# Patient Record
Sex: FEMALE | Race: WHITE | Employment: OTHER | URBAN - METROPOLITAN AREA
[De-identification: names, ages, dates, MRNs, and addresses within clinical notes are randomized per-mention and may not be internally consistent; named-entity substitution may affect disease eponyms.]

---

## 2017-03-01 PROBLEM — N32.1 VESICOCOLIC FISTULA: Status: ACTIVE | Noted: 2017-03-01

## 2017-04-26 PROBLEM — Z93.2 ILEOSTOMY STATUS (HCC): Status: ACTIVE | Noted: 2017-04-26

## 2017-08-11 PROBLEM — K43.2 INCISIONAL HERNIA: Status: ACTIVE | Noted: 2017-08-11

## 2017-11-16 ENCOUNTER — OFFICE VISIT (OUTPATIENT)
Dept: ORTHOPEDIC SURGERY | Age: 67
End: 2017-11-16

## 2017-11-16 VITALS
SYSTOLIC BLOOD PRESSURE: 156 MMHG | OXYGEN SATURATION: 97 % | HEIGHT: 66 IN | TEMPERATURE: 98.3 F | DIASTOLIC BLOOD PRESSURE: 84 MMHG | BODY MASS INDEX: 32.78 KG/M2 | WEIGHT: 204 LBS | RESPIRATION RATE: 18 BRPM | HEART RATE: 78 BPM

## 2017-11-16 DIAGNOSIS — M85.80 OSTEOPENIA DETERMINED BY X-RAY: ICD-10-CM

## 2017-11-16 DIAGNOSIS — Z96.651 HISTORY OF RIGHT KNEE JOINT REPLACEMENT: ICD-10-CM

## 2017-11-16 DIAGNOSIS — M54.50 CHRONIC BILATERAL LOW BACK PAIN WITHOUT SCIATICA: ICD-10-CM

## 2017-11-16 DIAGNOSIS — M17.12 PRIMARY OSTEOARTHRITIS OF LEFT KNEE: ICD-10-CM

## 2017-11-16 DIAGNOSIS — G89.29 CHRONIC PAIN OF RIGHT KNEE: ICD-10-CM

## 2017-11-16 DIAGNOSIS — M16.11 PRIMARY OSTEOARTHRITIS OF RIGHT HIP: Primary | ICD-10-CM

## 2017-11-16 DIAGNOSIS — M25.561 CHRONIC PAIN OF RIGHT KNEE: ICD-10-CM

## 2017-11-16 DIAGNOSIS — Z78.0 POST-MENOPAUSAL: ICD-10-CM

## 2017-11-16 DIAGNOSIS — G89.29 CHRONIC BILATERAL LOW BACK PAIN WITHOUT SCIATICA: ICD-10-CM

## 2017-11-16 RX ORDER — MULTIVITAMIN WITH IRON
1 TABLET ORAL DAILY
COMMUNITY
End: 2018-08-08 | Stop reason: SDUPTHER

## 2017-11-16 RX ORDER — LANOLIN ALCOHOL/MO/W.PET/CERES
1000 CREAM (GRAM) TOPICAL DAILY
COMMUNITY

## 2017-11-16 RX ORDER — FUROSEMIDE 20 MG/1
TABLET ORAL 2 TIMES DAILY
COMMUNITY

## 2017-11-16 RX ORDER — LANOLIN ALCOHOL/MO/W.PET/CERES
1000 CREAM (GRAM) TOPICAL DAILY
COMMUNITY
End: 2018-04-24

## 2017-11-16 RX ORDER — LANOLIN ALCOHOL/MO/W.PET/CERES
CREAM (GRAM) TOPICAL DAILY
COMMUNITY
End: 2018-04-24

## 2017-11-16 RX ORDER — BETAMETHASONE SODIUM PHOSPHATE AND BETAMETHASONE ACETATE 3; 3 MG/ML; MG/ML
6 INJECTION, SUSPENSION INTRA-ARTICULAR; INTRALESIONAL; INTRAMUSCULAR; SOFT TISSUE ONCE
Qty: 1 ML | Refills: 0
Start: 2017-11-16 | End: 2017-11-16

## 2017-11-16 RX ORDER — POTASSIUM CHLORIDE 20 MEQ/1
TABLET, EXTENDED RELEASE ORAL DAILY
COMMUNITY

## 2017-11-16 NOTE — PROGRESS NOTES
Patient: Abhijeet Garrido                MRN: [id number]       SSN: [id number]  YOB: 1950        AGE: 74 y.o. SEX: female  Body mass index is 33.43 kg/(m^2). PCP: No primary care provider on file. 11/16/17  HISTORY: Ms. Gene Albarran is here today with complaints of severe right hip pain. She has had a knee replacement by Dr. Yayo Lemus about 15 years ago, which is starting to wear, and she has severe arthritis of the left knee, especially patellofemoral.  Her right hip is actually slightly neglected, and I think she could have had surgery about four years ago or so. The daughter is very much in favor of it. The whole family has had a history, somewhat, of hip dysplasia. The pain can be severe, and she would like the right hip replaced. We are going to get a bone density scan because it appears she has osteoporosis. She also complains of a bad back with radiculopathy all the way down the back. Apparently, when she had her diverticular surgery, they could not do a spinal due to the arthritis. PHYSICAL EXAMINATION:  On examination today, she walks very poorly with a significantly antalgic gait owing to the right side. She moves the head and neck adequately. The low back is fairly tender, and she does have decreased sensation to L4-5 bilaterally, although there is no foot drop. Straight leg raise is just equivocal.  The left knee has findings consistent with severe end-staged arthritis patellofemoral disease. RADIOGRAPHS:  Review of her x-rays, AP of the pelvis and AP and lateral of the hip, she has dysplasia and superior migration of the humeral head with a fairly dysplastic cup as well, and it looks like osteoporosis. The opposite hip is in pretty good shape. The right knee LCS has some mild osteolysis. The left knee has findings consistent with fairly severe arthritis.      PROCEDURE:  Under aseptic conditions and after informed, written consent with a time out, the left knee was injected with 1 cc of the Celestone preparation, i.e. 6 mg, which was well tolerated. PLAN:  She is going to return to see us after her bone density scan and MRI of the lumbar spine. I would favor a larger operation for her with an acetabular reconstruction. I would decline to do a direct anterior for her, and I understand if she wishes to go elsewhere for this, but I think she should have an acetabular reconstruction, and I think she will do extremely well with it. Having said this, other risks and benefits are described including but not limited to infection, DVT, pulmonary embolism, anesthetic complications, blood loss requiring transfusion, pain, stiffness, and other complications such as permanent limp. She walks very, very poorly today with a lurch. We will see her back accordingly. REVIEW OF SYSTEMS:      CON: negative for weight loss, fever  EYE: negative for double vision  ENT: negative for hoarseness  RS:   negative for Tb  GI:    negative for blood in stool  :  negative for blood in urine  Other systems reviewed and noted below. No past medical history on file. Family History   Problem Relation Age of Onset    Heart Disease Mother     Heart Attack Mother        Current Outpatient Prescriptions   Medication Sig Dispense Refill    potassium chloride (KLOR-CON M20) 20 mEq tablet Take  by mouth two (2) times a day.  furosemide (LASIX) 20 mg tablet Take  by mouth daily.  magnesium gluconate 500 mg (27 mg  elemental) tablet Take  by mouth two (2) times a day.  cyanocobalamin 1,000 mcg tablet Take 1,000 mcg by mouth daily.  cyanocobalamin (VITAMIN B-12) 1,000 mcg tablet Take 1,000 mcg by mouth daily.  VITAMIN E, DL,TOCOPHERYL ACET, (VITAMIN E ACETATE) 400 unit cap capsule Take  by mouth daily.  multivitamin with iron tablet Take 1 Tab by mouth daily.          Allergies   Allergen Reactions    Ancef [Cefazolin] Itching Past Surgical History:   Procedure Laterality Date    HX HERNIA REPAIR      HX KNEE REPLACEMENT      HX TONSILLECTOMY      HX TUBAL LIGATION         Social History     Social History    Marital status: UNKNOWN     Spouse name: N/A    Number of children: N/A    Years of education: N/A     Occupational History    Not on file. Social History Main Topics    Smoking status: Light Tobacco Smoker    Smokeless tobacco: Never Used    Alcohol use Not on file    Drug use: Not on file    Sexual activity: Not on file     Other Topics Concern    Not on file     Social History Narrative    No narrative on file       Visit Vitals    /84    Pulse 78    Temp 98.3 °F (36.8 °C) (Oral)    Resp 18    Ht 5' 5.5\" (1.664 m)    Wt 204 lb (92.5 kg)    SpO2 97%    BMI 33.43 kg/m2         PHYSICAL EXAMINATION:  GENERAL: Alert and oriented x3, in no acute distress, well-developed, well-nourished, afebrile. HEART: No JVD. EYES: No scleral icterus   NECK: No significant lymphadenopathy   LUNGS: No respiratory compromise or indrawing  ABDOMEN: Soft, non-tender, non-distended. Electronically signed by:  Shirley Morgan MD

## 2017-11-20 ENCOUNTER — DOCUMENTATION ONLY (OUTPATIENT)
Dept: ORTHOPEDIC SURGERY | Age: 67
End: 2017-11-20

## 2018-01-09 DIAGNOSIS — G89.29 CHRONIC BILATERAL LOW BACK PAIN WITHOUT SCIATICA: ICD-10-CM

## 2018-01-09 DIAGNOSIS — Z78.0 POST-MENOPAUSAL: ICD-10-CM

## 2018-01-09 DIAGNOSIS — M54.50 CHRONIC BILATERAL LOW BACK PAIN WITHOUT SCIATICA: ICD-10-CM

## 2018-01-09 DIAGNOSIS — M85.80 OSTEOPENIA DETERMINED BY X-RAY: ICD-10-CM

## 2018-02-23 ENCOUNTER — OFFICE VISIT (OUTPATIENT)
Dept: ORTHOPEDIC SURGERY | Age: 68
End: 2018-02-23

## 2018-02-23 VITALS
HEIGHT: 66 IN | SYSTOLIC BLOOD PRESSURE: 173 MMHG | WEIGHT: 203.6 LBS | OXYGEN SATURATION: 97 % | BODY MASS INDEX: 32.72 KG/M2 | HEART RATE: 78 BPM | TEMPERATURE: 98.7 F | DIASTOLIC BLOOD PRESSURE: 84 MMHG

## 2018-02-23 DIAGNOSIS — M16.11 PRIMARY OSTEOARTHRITIS OF RIGHT HIP: Primary | ICD-10-CM

## 2018-02-23 DIAGNOSIS — M85.80 OSTEOPENIA, UNSPECIFIED LOCATION: ICD-10-CM

## 2018-04-09 DIAGNOSIS — Z01.818 PREOP EXAMINATION: Primary | ICD-10-CM

## 2018-04-09 DIAGNOSIS — M16.11 PRIMARY OSTEOARTHRITIS OF RIGHT HIP: ICD-10-CM

## 2018-06-12 DIAGNOSIS — M17.12 PRIMARY OSTEOARTHRITIS OF LEFT KNEE: ICD-10-CM

## 2018-06-12 DIAGNOSIS — Z01.818 PREOP EXAMINATION: Primary | ICD-10-CM

## 2018-06-27 ENCOUNTER — HOSPITAL ENCOUNTER (OUTPATIENT)
Dept: PREADMISSION TESTING | Age: 68
Discharge: HOME | End: 2018-06-27
Payer: MEDICARE

## 2018-06-27 ENCOUNTER — HOSPITAL ENCOUNTER (OUTPATIENT)
Dept: GENERAL RADIOLOGY | Age: 68
Discharge: HOME | End: 2018-06-27
Payer: MEDICARE

## 2018-06-27 DIAGNOSIS — M17.12 PRIMARY OSTEOARTHRITIS OF LEFT KNEE: ICD-10-CM

## 2018-06-27 DIAGNOSIS — Z01.818 PREOP EXAMINATION: ICD-10-CM

## 2018-06-27 LAB
ABO + RH BLD: NORMAL
ALBUMIN SERPL-MCNC: 3.7 G/DL (ref 3.4–5)
ANION GAP SERPL CALC-SCNC: 5 MMOL/L (ref 3–18)
APPEARANCE UR: ABNORMAL
APTT PPP: 30.3 SEC (ref 23–36.4)
BASOPHILS # BLD: 0 K/UL (ref 0–0.06)
BASOPHILS NFR BLD: 0 % (ref 0–2)
BILIRUB UR QL: NEGATIVE
BLOOD GROUP ANTIBODIES SERPL: NORMAL
BUN SERPL-MCNC: 18 MG/DL (ref 7–18)
BUN/CREAT SERPL: 20 (ref 12–20)
CALCIUM SERPL-MCNC: 8.6 MG/DL (ref 8.5–10.1)
CHLORIDE SERPL-SCNC: 110 MMOL/L (ref 100–108)
CO2 SERPL-SCNC: 29 MMOL/L (ref 21–32)
COLOR UR: YELLOW
CREAT SERPL-MCNC: 0.91 MG/DL (ref 0.6–1.3)
DIFFERENTIAL METHOD BLD: ABNORMAL
EOSINOPHIL # BLD: 0.1 K/UL (ref 0–0.4)
EOSINOPHIL NFR BLD: 1 % (ref 0–5)
ERYTHROCYTE [DISTWIDTH] IN BLOOD BY AUTOMATED COUNT: 15.4 % (ref 11.6–14.5)
EST. AVERAGE GLUCOSE BLD GHB EST-MCNC: 111 MG/DL
GLUCOSE SERPL-MCNC: 93 MG/DL (ref 74–99)
GLUCOSE UR STRIP.AUTO-MCNC: NEGATIVE MG/DL
HBA1C MFR BLD: 5.5 % (ref 4.2–5.6)
HCT VFR BLD AUTO: 39.1 % (ref 35–45)
HGB BLD-MCNC: 12.3 G/DL (ref 12–16)
HGB UR QL STRIP: NEGATIVE
INR PPP: 1.1 (ref 0.8–1.2)
KETONES UR QL STRIP.AUTO: ABNORMAL MG/DL
LEUKOCYTE ESTERASE UR QL STRIP.AUTO: NEGATIVE
LYMPHOCYTES # BLD: 3.6 K/UL (ref 0.9–3.6)
LYMPHOCYTES NFR BLD: 53 % (ref 21–52)
MCH RBC QN AUTO: 29.4 PG (ref 24–34)
MCHC RBC AUTO-ENTMCNC: 31.5 G/DL (ref 31–37)
MCV RBC AUTO: 93.5 FL (ref 74–97)
MONOCYTES # BLD: 0.3 K/UL (ref 0.05–1.2)
MONOCYTES NFR BLD: 4 % (ref 3–10)
NEUTS SEG # BLD: 2.9 K/UL (ref 1.8–8)
NEUTS SEG NFR BLD: 42 % (ref 40–73)
NITRITE UR QL STRIP.AUTO: NEGATIVE
PH UR STRIP: 5 [PH] (ref 5–8)
PLATELET # BLD AUTO: 219 K/UL (ref 135–420)
PMV BLD AUTO: 11.6 FL (ref 9.2–11.8)
POTASSIUM SERPL-SCNC: 3.7 MMOL/L (ref 3.5–5.5)
PROT UR STRIP-MCNC: NEGATIVE MG/DL
PROTHROMBIN TIME: 13.3 SEC (ref 11.5–15.2)
RBC # BLD AUTO: 4.18 M/UL (ref 4.2–5.3)
SODIUM SERPL-SCNC: 144 MMOL/L (ref 136–145)
SP GR UR REFRACTOMETRY: >1.03 (ref 1–1.03)
SPECIMEN EXP DATE BLD: NORMAL
UROBILINOGEN UR QL STRIP.AUTO: 1 EU/DL (ref 0.2–1)
WBC # BLD AUTO: 6.9 K/UL (ref 4.6–13.2)

## 2018-06-27 PROCEDURE — 86900 BLOOD TYPING SEROLOGIC ABO: CPT | Performed by: PHYSICIAN ASSISTANT

## 2018-06-27 PROCEDURE — 93005 ELECTROCARDIOGRAM TRACING: CPT

## 2018-06-27 PROCEDURE — 36415 COLL VENOUS BLD VENIPUNCTURE: CPT | Performed by: PHYSICIAN ASSISTANT

## 2018-06-27 PROCEDURE — 82040 ASSAY OF SERUM ALBUMIN: CPT | Performed by: PHYSICIAN ASSISTANT

## 2018-06-27 PROCEDURE — 85025 COMPLETE CBC W/AUTO DIFF WBC: CPT | Performed by: PHYSICIAN ASSISTANT

## 2018-06-27 PROCEDURE — 85730 THROMBOPLASTIN TIME PARTIAL: CPT | Performed by: PHYSICIAN ASSISTANT

## 2018-06-27 PROCEDURE — 71046 X-RAY EXAM CHEST 2 VIEWS: CPT

## 2018-06-27 PROCEDURE — 85610 PROTHROMBIN TIME: CPT | Performed by: PHYSICIAN ASSISTANT

## 2018-06-27 PROCEDURE — 83036 HEMOGLOBIN GLYCOSYLATED A1C: CPT | Performed by: PHYSICIAN ASSISTANT

## 2018-06-27 PROCEDURE — 87086 URINE CULTURE/COLONY COUNT: CPT | Performed by: PHYSICIAN ASSISTANT

## 2018-06-27 PROCEDURE — 81003 URINALYSIS AUTO W/O SCOPE: CPT | Performed by: PHYSICIAN ASSISTANT

## 2018-06-27 PROCEDURE — 80048 BASIC METABOLIC PNL TOTAL CA: CPT | Performed by: PHYSICIAN ASSISTANT

## 2018-06-27 NOTE — PROGRESS NOTES
Ms Nayana Jha attended the Joint Replacement Pre-Operative class on  6/27/2018. Topics discussed included surgery preparation, what to expect the day of surgery, medications, physical and occupational therapy, and discharge planning. It was discussed that this is considered an elective surgery and that prior to the surgery she needs to make decisions such as arranging for help at home once she is discharged. Ms Nayana Jha was given a total hip replacement patient education notebook to take home. Opportunity was given to ask questions and phone number of the Orthopaedic   was given for any questions or concerns that may arise later. Ms Nayana Jha identified Dionne Resendez as  through surgical/recovery process.

## 2018-06-28 ENCOUNTER — OFFICE VISIT (OUTPATIENT)
Dept: ORTHOPEDIC SURGERY | Facility: CLINIC | Age: 68
End: 2018-06-28

## 2018-06-28 VITALS
HEART RATE: 83 BPM | BODY MASS INDEX: 31.18 KG/M2 | WEIGHT: 194 LBS | DIASTOLIC BLOOD PRESSURE: 83 MMHG | HEIGHT: 66 IN | SYSTOLIC BLOOD PRESSURE: 187 MMHG | TEMPERATURE: 98.3 F | OXYGEN SATURATION: 97 %

## 2018-06-28 DIAGNOSIS — Z01.818 PRE-OPERATIVE EXAM: ICD-10-CM

## 2018-06-28 DIAGNOSIS — M16.11 PRIMARY OSTEOARTHRITIS OF RIGHT HIP: Primary | ICD-10-CM

## 2018-06-28 LAB
ATRIAL RATE: 60 BPM
CALCULATED P AXIS, ECG09: 48 DEGREES
CALCULATED R AXIS, ECG10: 23 DEGREES
CALCULATED T AXIS, ECG11: 49 DEGREES
DIAGNOSIS, 93000: NORMAL
P-R INTERVAL, ECG05: 148 MS
Q-T INTERVAL, ECG07: 426 MS
QRS DURATION, ECG06: 84 MS
QTC CALCULATION (BEZET), ECG08: 426 MS
VENTRICULAR RATE, ECG03: 60 BPM

## 2018-06-28 RX ORDER — ACETAMINOPHEN 325 MG/1
1000 TABLET ORAL ONCE
Status: CANCELLED | OUTPATIENT
Start: 2018-06-28 | End: 2018-06-28

## 2018-06-28 RX ORDER — PREGABALIN 25 MG/1
75 CAPSULE ORAL ONCE
Status: CANCELLED | OUTPATIENT
Start: 2018-06-28 | End: 2018-06-28

## 2018-06-28 RX ORDER — CELECOXIB 100 MG/1
200 CAPSULE ORAL ONCE
Status: CANCELLED | OUTPATIENT
Start: 2018-06-28 | End: 2018-06-28

## 2018-06-28 RX ORDER — WARFARIN 1 MG/1
10 TABLET ORAL ONCE
Status: CANCELLED | OUTPATIENT
Start: 2018-06-28 | End: 2018-06-28

## 2018-06-28 NOTE — H&P
[address], [address] [facility]  [phone]           HISTORY & PHYSICAL      Patient: Owen Sherman                MRN: [id number]       SSN: [id number]  YOB: 1950        AGE: 79 y.o. SEX: female  Body mass index is 31.31 kg/(m^2). PCP: Aracelis Perrin MD  06/28/18      CC: right hip end stage OA, [facility]  Problem List Items Addressed This Visit     None      Visit Diagnoses     Primary osteoarthritis of right hip    -  Primary    Relevant Orders    AMB POC X-RAY RADEX HIP UNI WITH PELVIS 2-3 VIEWS (Completed)    Pre-operative exam        Relevant Orders    AMB POC X-RAY RADEX HIP UNI WITH PELVIS 2-3 VIEWS (Completed)            HPI:  The patient is a pleasant 79 y.o. whom has end stage OA of their Right hip and has failed conservative treatment including but not limited to NSAIDS, cortisone injections, viscosupplementation, PT, and pain medicine. Due to the current findings and affected activities of daily living, surgical intervention is indicated. The alternatives, risks, complications, as well as expected outcome were discussed. These include but are not limited to infection, blood loss, need for blood transfusion, neurovascular damage, DVT, PE,  post-op stiffness and pain, leg length discrepancy, dislocation, anesthetic complications, prothesis longevity, need for more surgery, MI, stroke, and even death. The patient understands and wishes to proceed with surgery.       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)     RLQ - leakage from ileostomy    BMI 31.0-31.9,adult     31.8    Chronic pain     right leg pain; spine and hip down to ankle    Decreased exercise tolerance     mets <4 (SOB)    Depression     Diverticulitis     Fistula of enterostomy (HCC)     Incisional hernia     Sciatic pain     SOB (shortness of breath)          Current Outpatient Prescriptions:     acetaminophen (TYLENOL) 650 mg TbER, Take 650 mg by mouth every eight (8) hours. Indications: Arthritic Pain, Disp: , Rfl:     potassium chloride (KLOR-CON M20) 20 mEq tablet, Take  by mouth two (2) times a day., Disp: , Rfl:     furosemide (LASIX) 20 mg tablet, Take  by mouth two (2) times a day. Indications: Edema, Disp: , Rfl:     magnesium gluconate 500 mg (27 mg  elemental) tablet, Take  by mouth two (2) times a day., Disp: , Rfl:     cyanocobalamin (VITAMIN B-12) 1,000 mcg tablet, Take 1,000 mcg by mouth daily. , Disp: , Rfl:     multivitamin with iron tablet, Take 1 Tab by mouth daily. , Disp: , Rfl:     MULTIVITAMIN WITH IRON (CHILDREN'S VITAMIN WITH IRON PO), Take 1 Tab by mouth daily. , Disp: , Rfl:     vitamin E (AQUA GEMS) 400 unit capsule, Take 400 Units by mouth., Disp: , Rfl:     oxyCODONE-acetaminophen (PERCOCET) 5-325 mg per tablet, Take 1-2 Tabs by mouth every four (4) hours as needed for Pain. Max Daily Amount: 12 Tabs., Disp: 40 Tab, Rfl: 0    Allergies   Allergen Reactions    Ancef [Cefazolin] Itching and Hives    Cefazolin Hives       Social History     Social History    Marital status:      Spouse name: N/A    Number of children: N/A    Years of education: N/A     Occupational History    Not on file.      Social History Main Topics    Smoking status: Former Smoker     Quit date: 1/1/2017    Smokeless tobacco: Never Used    Alcohol use No    Drug use: No    Sexual activity: Not on file     Other Topics Concern    Not on file     Social History Narrative    ** Merged History Encounter **            Past Surgical History:   Procedure Laterality Date    FLEXIBLE SIGMOIDOSCOPY N/A 4/26/2017    FLEXIBLE SIGMOIDOSCOPY  performed by Jacklyn Dye MD at [address] [facility] GI  04/2017    reversal of ileostomy    HX HEENT      tonsillectomy    HX HERNIA REPAIR      HX KNEE REPLACEMENT N/A     right elbow repair    HX KNEE REPLACEMENT Right     HX SMALL BOWEL RESECTION      ileostomy formation    HX QUENTIN AND BSO      HX TONSILLECTOMY      HX TUBAL LIGATION N/A     reopened tubes    HX TUBAL LIGATION      HX UROLOGICAL  03/01/2017    s/p Placement of bilateral double-J stents - 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 PE:  Visit Vitals    /83 (BP 1 Location: Right arm, BP Patient Position: Sitting)    Pulse 83    Temp 98.3 °F (36.8 °C) (Oral)    Ht 5' 6\" (1.676 m)    Wt 194 lb (88 kg)    SpO2 97%    BMI 31.31 kg/m2     A&O X3, NAD, well develop, well nourished  Heart: S1-S2, rrr  Lungs: CTA bilat  Abd: soft, nt, nt, + bs in all quadrants  Ext:  Pos distal pulses to DP, PT  Leg lengths show the right LE to be slightly shorter grossly sitting in the chair, this was explained to the patient. X-ray: right hip shows end stage OA    Labs: labs were reviewed and wnl.  ua neg    A:  Right  hip end stage OA    P:  At this point we will move forward with surgery. Again, the alternatives, risks, complications, as well as expected outcome were discussed and the patient wishes to proceed with surgery. Pt has been instructed to stop aspirin, nsaids, rheumatologic medications and blood thinners. They have also been instructed to continue on any heart and bp meds and to take them the morning of surgery with sips of water. Lateral approach. The patient will require in patient admission due to above stated medical conditions as well the the surgical challenges given the anatomy and bone quality.          Allegra Ojeda

## 2018-06-29 LAB
BACTERIA SPEC CULT: NORMAL
SERVICE CMNT-IMP: NORMAL

## 2018-07-10 ENCOUNTER — HOSPITAL ENCOUNTER (OUTPATIENT)
Age: 68
Discharge: HOME | DRG: 554 | End: 2018-07-10
Attending: ORTHOPAEDIC SURGERY | Admitting: ORTHOPAEDIC SURGERY
Payer: MEDICARE

## 2018-07-10 VITALS
WEIGHT: 190.5 LBS | HEART RATE: 78 BPM | SYSTOLIC BLOOD PRESSURE: 190 MMHG | DIASTOLIC BLOOD PRESSURE: 84 MMHG | TEMPERATURE: 99.2 F | HEIGHT: 66 IN | BODY MASS INDEX: 30.62 KG/M2 | RESPIRATION RATE: 12 BRPM | OXYGEN SATURATION: 100 %

## 2018-07-10 DIAGNOSIS — M16.10 HIP ARTHRITIS: Primary | ICD-10-CM

## 2018-07-10 PROCEDURE — 74011250637 HC RX REV CODE- 250/637: Performed by: NURSE ANESTHETIST, CERTIFIED REGISTERED

## 2018-07-10 PROCEDURE — 74011250636 HC RX REV CODE- 250/636: Performed by: NURSE ANESTHETIST, CERTIFIED REGISTERED

## 2018-07-10 PROCEDURE — 64520 N BLOCK LUMBAR/THORACIC: CPT | Performed by: ANESTHESIOLOGY

## 2018-07-10 PROCEDURE — 74011250636 HC RX REV CODE- 250/636

## 2018-07-10 PROCEDURE — 74011250637 HC RX REV CODE- 250/637: Performed by: PHYSICIAN ASSISTANT

## 2018-07-10 PROCEDURE — 76942 ECHO GUIDE FOR BIOPSY: CPT | Performed by: ANESTHESIOLOGY

## 2018-07-10 PROCEDURE — 65270000029 HC RM PRIVATE

## 2018-07-10 RX ORDER — LIDOCAINE HYDROCHLORIDE 10 MG/ML
0.1 INJECTION, SOLUTION EPIDURAL; INFILTRATION; INTRACAUDAL; PERINEURAL AS NEEDED
Status: DISCONTINUED | OUTPATIENT
Start: 2018-07-10 | End: 2018-07-10 | Stop reason: HOSPADM

## 2018-07-10 RX ORDER — MIDAZOLAM HYDROCHLORIDE 1 MG/ML
2 INJECTION, SOLUTION INTRAMUSCULAR; INTRAVENOUS ONCE
Status: DISCONTINUED | OUTPATIENT
Start: 2018-07-10 | End: 2018-07-10 | Stop reason: HOSPADM

## 2018-07-10 RX ORDER — SODIUM CHLORIDE 0.9 % (FLUSH) 0.9 %
5-10 SYRINGE (ML) INJECTION AS NEEDED
Status: DISCONTINUED | OUTPATIENT
Start: 2018-07-10 | End: 2018-07-10 | Stop reason: HOSPADM

## 2018-07-10 RX ORDER — SODIUM CHLORIDE, SODIUM LACTATE, POTASSIUM CHLORIDE, CALCIUM CHLORIDE 600; 310; 30; 20 MG/100ML; MG/100ML; MG/100ML; MG/100ML
75 INJECTION, SOLUTION INTRAVENOUS CONTINUOUS
Status: DISCONTINUED | OUTPATIENT
Start: 2018-07-10 | End: 2018-07-10 | Stop reason: HOSPADM

## 2018-07-10 RX ORDER — FAMOTIDINE 20 MG/1
20 TABLET, FILM COATED ORAL ONCE
Status: COMPLETED | OUTPATIENT
Start: 2018-07-10 | End: 2018-07-10

## 2018-07-10 RX ORDER — PREGABALIN 75 MG/1
75 CAPSULE ORAL ONCE
Status: COMPLETED | OUTPATIENT
Start: 2018-07-10 | End: 2018-07-10

## 2018-07-10 RX ORDER — WARFARIN 10 MG/1
10 TABLET ORAL ONCE
Status: COMPLETED | OUTPATIENT
Start: 2018-07-10 | End: 2018-07-10

## 2018-07-10 RX ORDER — INSULIN LISPRO 100 [IU]/ML
INJECTION, SOLUTION INTRAVENOUS; SUBCUTANEOUS ONCE
Status: DISCONTINUED | OUTPATIENT
Start: 2018-07-10 | End: 2018-07-10 | Stop reason: HOSPADM

## 2018-07-10 RX ORDER — ACETAMINOPHEN 500 MG
1000 TABLET ORAL ONCE
Status: COMPLETED | OUTPATIENT
Start: 2018-07-10 | End: 2018-07-10

## 2018-07-10 RX ORDER — ROPIVACAINE HYDROCHLORIDE 2 MG/ML
30 INJECTION, SOLUTION EPIDURAL; INFILTRATION; PERINEURAL
Status: DISCONTINUED | OUTPATIENT
Start: 2018-07-10 | End: 2018-07-10 | Stop reason: HOSPADM

## 2018-07-10 RX ORDER — HYDROCODONE BITARTRATE AND ACETAMINOPHEN 5; 325 MG/1; MG/1
1-2 TABLET ORAL
Qty: 35 TAB | Refills: 0 | Status: SHIPPED | OUTPATIENT
Start: 2018-07-10 | End: 2018-08-15

## 2018-07-10 RX ORDER — CELECOXIB 100 MG/1
200 CAPSULE ORAL ONCE
Status: COMPLETED | OUTPATIENT
Start: 2018-07-10 | End: 2018-07-10

## 2018-07-10 RX ORDER — SODIUM CHLORIDE 0.9 % (FLUSH) 0.9 %
5-10 SYRINGE (ML) INJECTION EVERY 8 HOURS
Status: DISCONTINUED | OUTPATIENT
Start: 2018-07-10 | End: 2018-07-10 | Stop reason: HOSPADM

## 2018-07-10 RX ORDER — FENTANYL CITRATE 50 UG/ML
100 INJECTION, SOLUTION INTRAMUSCULAR; INTRAVENOUS ONCE
Status: DISCONTINUED | OUTPATIENT
Start: 2018-07-10 | End: 2018-07-10 | Stop reason: HOSPADM

## 2018-07-10 RX ADMIN — SODIUM CHLORIDE, SODIUM LACTATE, POTASSIUM CHLORIDE, AND CALCIUM CHLORIDE 75 ML/HR: 600; 310; 30; 20 INJECTION, SOLUTION INTRAVENOUS at 11:40

## 2018-07-10 RX ADMIN — WARFARIN SODIUM 10 MG: 10 TABLET ORAL at 11:52

## 2018-07-10 RX ADMIN — PREGABALIN 75 MG: 75 CAPSULE ORAL at 11:52

## 2018-07-10 RX ADMIN — FAMOTIDINE 20 MG: 20 TABLET ORAL at 11:52

## 2018-07-10 RX ADMIN — ACETAMINOPHEN 1000 MG: 500 TABLET, FILM COATED ORAL at 11:52

## 2018-07-10 RX ADMIN — CELECOXIB 200 MG: 100 CAPSULE ORAL at 11:52

## 2018-07-10 NOTE — DISCHARGE INSTRUCTIONS
DISCHARGE SUMMARY:    Report the following to your surgeon:  · Excessive pain, swelling, redness or odor of or around the AFFECTED area  · Temperature over 100.5  · Nausea and vomiting lasting longer than 4 hours or if unable to take medications  · Any signs of decreased circulation or nerve impairment to extremity: change in color, persistent  numbness, tingling, coldness or increase pain  · Any questions    What to do at Home:  Recommended activity: AS TOLERATED,     If you experience any of the following symptoms PLEASE FOLLOW-UP WITH DR. Luna Conti. *  Please give a list of your current medications to your Primary Care Provider. *  Please update this list whenever your medications are discontinued, doses are      changed, or new medications (including over-the-counter products) are added. *  Please carry medication information at all times in case of emergency situations. These are general instructions for a healthy lifestyle:    No smoking/ No tobacco products/ Avoid exposure to second hand smoke  Surgeon General's Warning:  Quitting smoking now greatly reduces serious risk to your health. Obesity, smoking, and sedentary lifestyle greatly increases your risk for illness    A healthy diet, regular physical exercise & weight monitoring are important for maintaining a healthy lifestyle    You may be retaining fluid if you have a history of heart failure or if you experience any of the following symptoms:  Weight gain of 3 pounds or more overnight or 5 pounds in a week, increased swelling in our hands or feet or shortness of breath while lying flat in bed. Please call your doctor as soon as you notice any of these symptoms; do not wait until your next office visit.     Recognize signs and symptoms of STROKE:    F-face looks uneven    A-arms unable to move or move unevenly    S-speech slurred or non-existent    T-time-call 911 as soon as signs and symptoms begin-DO NOT go       Back to bed or wait to see if you get better-TIME IS BRAIN. Warning Signs of HEART ATTACK     Call 911 if you have these symptoms:   Chest discomfort. Most heart attacks involve discomfort in the center of the chest that lasts more than a few minutes, or that goes away and comes back. It can feel like uncomfortable pressure, squeezing, fullness, or pain.  Discomfort in other areas of the upper body. Symptoms can include pain or discomfort in one or both arms, the back, neck, jaw, or stomach.  Shortness of breath with or without chest discomfort.  Other signs may include breaking out in a cold sweat, nausea, or lightheadedness. Don't wait more than five minutes to call 911 - MINUTES MATTER! Fast action can save your life. Calling 911 is almost always the fastest way to get lifesaving treatment. Emergency Medical Services staff can begin treatment when they arrive -- up to an hour sooner than if someone gets to the hospital by car. The discharge information has been reviewed with Ralph Sheffield. The patient Ralph Sheffield verbalized understanding. Discharge medications reviewed with Ralph Sheffield and appropriate educational materials and side effects teaching were provided.   ___________________________________________________________________________________________________________________________________

## 2018-07-10 NOTE — INTERVAL H&P NOTE
H&P Update:  Elbert Allen was seen and examined. History and physical has been reviewed. The patient has been examined.  There have been no significant clinical changes since the completion of the originally dated History and Physical.    Signed By: Alexandr Lyons MD     July 10, 2018 12:48 PM

## 2018-07-10 NOTE — H&P (VIEW-ONLY)
[address], [address] [facility] [address] 
[phone] HISTORY & PHYSICAL Patient: Joey Reyna                MRN: [id number]       SSN: [id number] YOB: 1950        AGE: 79 y.o. SEX: female Body mass index is 31.31 kg/(m^2). PCP: Mohan Stuart MD 
06/28/18 CC: right hip end stage OA, [facility] Problem List Items Addressed This Visit None Visit Diagnoses Primary osteoarthritis of right hip    -  Primary Relevant Orders AMB POC X-RAY RADEX HIP UNI WITH PELVIS 2-3 VIEWS (Completed) Pre-operative exam      
 Relevant Orders AMB POC X-RAY RADEX HIP UNI WITH PELVIS 2-3 VIEWS (Completed) HPI:  The patient is a pleasant 79 y.o. whom has end stage OA of their Right hip and has failed conservative treatment including but not limited to NSAIDS, cortisone injections, viscosupplementation, PT, and pain medicine. Due to the current findings and affected activities of daily living, surgical intervention is indicated. The alternatives, risks, complications, as well as expected outcome were discussed. These include but are not limited to infection, blood loss, need for blood transfusion, neurovascular damage, DVT, PE,  post-op stiffness and pain, leg length discrepancy, dislocation, anesthetic complications, prothesis longevity, need for more surgery, MI, stroke, and even death. The patient understands and wishes to proceed with surgery. Past Medical History:  
Diagnosis Date  Altered bowel elimination due to intestinal ostomy ([address] RLQ - leakage from ileostomy  BMI 31.0-31.9,adult 31.8  Chronic pain   
 right leg pain; spine and hip down to ankle  Decreased exercise tolerance   
 mets <4 (SOB)  Depression  Diverticulitis  Fistula of enterostomy ([address]  Incisional hernia  Sciatic pain  SOB (shortness of breath) Current Outpatient Prescriptions:  
  acetaminophen (TYLENOL) 650 mg TbER, Take 650 mg by mouth every eight (8) hours. Indications: Arthritic Pain, Disp: , Rfl:  
  potassium chloride (KLOR-CON M20) 20 mEq tablet, Take  by mouth two (2) times a day., Disp: , Rfl:  
  furosemide (LASIX) 20 mg tablet, Take  by mouth two (2) times a day. Indications: Edema, Disp: , Rfl:  
  magnesium gluconate 500 mg (27 mg  elemental) tablet, Take  by mouth two (2) times a day., Disp: , Rfl:  
  cyanocobalamin (VITAMIN B-12) 1,000 mcg tablet, Take 1,000 mcg by mouth daily. , Disp: , Rfl:  
  multivitamin with iron tablet, Take 1 Tab by mouth daily. , Disp: , Rfl:  
  MULTIVITAMIN WITH IRON (CHILDREN'S VITAMIN WITH IRON PO), Take 1 Tab by mouth daily. , Disp: , Rfl:  
  vitamin E (AQUA GEMS) 400 unit capsule, Take 400 Units by mouth., Disp: , Rfl:  
  oxyCODONE-acetaminophen (PERCOCET) 5-325 mg per tablet, Take 1-2 Tabs by mouth every four (4) hours as needed for Pain. Max Daily Amount: 12 Tabs., Disp: 40 Tab, Rfl: 0 Allergies Allergen Reactions  Ancef [Cefazolin] Itching and Hives  Cefazolin Hives Social History Social History  Marital status:  Spouse name: N/A  
 Number of children: N/A  
 Years of education: N/A Occupational History  Not on file. Social History Main Topics  Smoking status: Former Smoker Quit date: 1/1/2017  Smokeless tobacco: Never Used  Alcohol use No  
 Drug use: No  
 Sexual activity: Not on file Other Topics Concern  Not on file Social History Narrative ** Merged History Encounter ** Past Surgical History:  
Procedure Laterality Date  FLEXIBLE SIGMOIDOSCOPY N/A 4/26/2017 FLEXIBLE SIGMOIDOSCOPY  performed by Marcelino Bruner MD at [place] [address]. HX GI  04/2017  
 reversal of ileostomy  HX HEENT    
 tonsillectomy  HX HERNIA REPAIR    
 HX KNEE REPLACEMENT N/A   
 right elbow repair  HX KNEE REPLACEMENT Right  HX SMALL BOWEL RESECTION    
 ileostomy formation  HX QUENTIN AND BSO  HX TONSILLECTOMY  HX TUBAL LIGATION N/A   
 reopened tubes  HX TUBAL LIGATION    
 HX UROLOGICAL  03/01/2017  
 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   
 
 
Family History:  Non-contributory. PE: 
Visit Vitals  /83 (BP 1 Location: Right arm, BP Patient Position: Sitting)  Pulse 83  Temp 98.3 °F (36.8 °C) (Oral)  Ht 5' 6\" (1.676 m)  Wt 194 lb (88 kg)  SpO2 97%  BMI 31.31 kg/m2 A&O X3, NAD, well develop, well nourished Heart: S1-S2, rrr 
Lungs: CTA bilat Abd: soft, nt, nt, + bs in all quadrants Ext:  Pos distal pulses to DP, PT Leg lengths show the right LE to be slightly shorter grossly sitting in the chair, this was explained to the patient. X-ray: right hip shows end stage OA Labs: labs were reviewed and wnl.  ua neg A:  Right  hip end stage OA 
 
P:  At this point we will move forward with surgery. Again, the alternatives, risks, complications, as well as expected outcome were discussed and the patient wishes to proceed with surgery. Pt has been instructed to stop aspirin, nsaids, rheumatologic medications and blood thinners. They have also been instructed to continue on any heart and bp meds and to take them the morning of surgery with sips of water. Lateral approach. The patient will require in patient admission due to above stated medical conditions as well the the surgical challenges given the anatomy and bone quality. Barbara Orr

## 2018-08-02 ENCOUNTER — HOSPITAL ENCOUNTER (OUTPATIENT)
Dept: PREADMISSION TESTING | Age: 68
Discharge: HOME | End: 2018-08-02
Payer: MEDICARE

## 2018-08-02 DIAGNOSIS — Z01.818 PREOP EXAMINATION: ICD-10-CM

## 2018-08-02 DIAGNOSIS — M16.11 PRIMARY OSTEOARTHRITIS OF RIGHT HIP: ICD-10-CM

## 2018-08-02 DIAGNOSIS — Z01.818 PREOP EXAMINATION: Primary | ICD-10-CM

## 2018-08-02 LAB
ABO + RH BLD: NORMAL
ANION GAP SERPL CALC-SCNC: 5 MMOL/L (ref 3–18)
APTT PPP: 32 SEC (ref 23–36.4)
BASOPHILS # BLD: 0 K/UL (ref 0–0.1)
BASOPHILS NFR BLD: 0 % (ref 0–2)
BLOOD GROUP ANTIBODIES SERPL: NORMAL
BUN SERPL-MCNC: 25 MG/DL (ref 7–18)
BUN/CREAT SERPL: 25 (ref 12–20)
CALCIUM SERPL-MCNC: 9.3 MG/DL (ref 8.5–10.1)
CHLORIDE SERPL-SCNC: 109 MMOL/L (ref 100–108)
CO2 SERPL-SCNC: 27 MMOL/L (ref 21–32)
CREAT SERPL-MCNC: 1 MG/DL (ref 0.6–1.3)
DIFFERENTIAL METHOD BLD: ABNORMAL
EOSINOPHIL # BLD: 0.1 K/UL (ref 0–0.4)
EOSINOPHIL NFR BLD: 1 % (ref 0–5)
ERYTHROCYTE [DISTWIDTH] IN BLOOD BY AUTOMATED COUNT: 14.4 % (ref 11.6–14.5)
GLUCOSE SERPL-MCNC: 95 MG/DL (ref 74–99)
HCT VFR BLD AUTO: 38.2 % (ref 35–45)
HGB BLD-MCNC: 12.4 G/DL (ref 12–16)
INR PPP: 1 (ref 0.8–1.2)
LYMPHOCYTES # BLD: 3.2 K/UL (ref 0.9–3.6)
LYMPHOCYTES NFR BLD: 52 % (ref 21–52)
MCH RBC QN AUTO: 30.2 PG (ref 24–34)
MCHC RBC AUTO-ENTMCNC: 32.5 G/DL (ref 31–37)
MCV RBC AUTO: 92.9 FL (ref 74–97)
MONOCYTES # BLD: 0.4 K/UL (ref 0.05–1.2)
MONOCYTES NFR BLD: 6 % (ref 3–10)
NEUTS SEG # BLD: 2.5 K/UL (ref 1.8–8)
NEUTS SEG NFR BLD: 41 % (ref 40–73)
PLATELET # BLD AUTO: 221 K/UL (ref 135–420)
PMV BLD AUTO: 11.5 FL (ref 9.2–11.8)
POTASSIUM SERPL-SCNC: 4.6 MMOL/L (ref 3.5–5.5)
PROTHROMBIN TIME: 13 SEC (ref 11.5–15.2)
RBC # BLD AUTO: 4.11 M/UL (ref 4.2–5.3)
SODIUM SERPL-SCNC: 141 MMOL/L (ref 136–145)
SPECIMEN EXP DATE BLD: NORMAL
WBC # BLD AUTO: 6.2 K/UL (ref 4.6–13.2)

## 2018-08-02 PROCEDURE — 80048 BASIC METABOLIC PNL TOTAL CA: CPT | Performed by: PHYSICIAN ASSISTANT

## 2018-08-02 PROCEDURE — 85730 THROMBOPLASTIN TIME PARTIAL: CPT | Performed by: PHYSICIAN ASSISTANT

## 2018-08-02 PROCEDURE — 86900 BLOOD TYPING SEROLOGIC ABO: CPT | Performed by: PHYSICIAN ASSISTANT

## 2018-08-02 PROCEDURE — 85610 PROTHROMBIN TIME: CPT | Performed by: PHYSICIAN ASSISTANT

## 2018-08-02 PROCEDURE — 36415 COLL VENOUS BLD VENIPUNCTURE: CPT | Performed by: PHYSICIAN ASSISTANT

## 2018-08-02 PROCEDURE — 85025 COMPLETE CBC W/AUTO DIFF WBC: CPT | Performed by: PHYSICIAN ASSISTANT

## 2018-08-02 RX ORDER — LISINOPRIL 20 MG/1
TABLET ORAL DAILY
COMMUNITY

## 2018-08-02 NOTE — PERIOP NOTES
Ms Chioma Tejada was here today for her  PAT appointment. Health assessment was completed and instructions given regarding NPO status, medications, Hibiclens washes, and removal of all jewelry and/or body piercing. Instructed patient not to remove the red Blood Bank armband that was placed on their arm when the Type and Screen was drawn. She denies having rashes or open wound. She was cancelled in July due to elevated BP. She is now taking Lisinopril. Her BP today is 130/77 from left arm. She requested to use her right arm for intravenous line on admission as she has poor veins on her left arm- patient request noted on huddle note. Opportunity was given to ask questions and all questions were answered. Understanding of instructions was verbalized.

## 2018-08-08 ENCOUNTER — OFFICE VISIT (OUTPATIENT)
Dept: ORTHOPEDIC SURGERY | Facility: CLINIC | Age: 68
End: 2018-08-08

## 2018-08-08 ENCOUNTER — HOSPITAL ENCOUNTER (OUTPATIENT)
Dept: PREADMISSION TESTING | Age: 68
Discharge: HOME | End: 2018-08-08
Payer: MEDICARE

## 2018-08-08 VITALS
HEIGHT: 66 IN | WEIGHT: 192.2 LBS | SYSTOLIC BLOOD PRESSURE: 130 MMHG | BODY MASS INDEX: 30.89 KG/M2 | OXYGEN SATURATION: 99 % | TEMPERATURE: 98 F | RESPIRATION RATE: 16 BRPM | DIASTOLIC BLOOD PRESSURE: 59 MMHG | HEART RATE: 58 BPM

## 2018-08-08 DIAGNOSIS — M16.11 PRIMARY OSTEOARTHRITIS OF RIGHT HIP: ICD-10-CM

## 2018-08-08 DIAGNOSIS — M16.11 PRIMARY OSTEOARTHRITIS OF RIGHT HIP: Primary | ICD-10-CM

## 2018-08-08 DIAGNOSIS — Z01.818 PREOP EXAMINATION: ICD-10-CM

## 2018-08-08 LAB
APPEARANCE UR: CLEAR
BACTERIA URNS QL MICRO: ABNORMAL /HPF
BILIRUB UR QL: NEGATIVE
COLOR UR: YELLOW
GLUCOSE UR STRIP.AUTO-MCNC: NEGATIVE MG/DL
HGB UR QL STRIP: NEGATIVE
HYALINE CASTS URNS QL MICRO: ABNORMAL /LPF (ref 0–2)
KETONES UR QL STRIP.AUTO: ABNORMAL MG/DL
LEUKOCYTE ESTERASE UR QL STRIP.AUTO: NEGATIVE
NITRITE UR QL STRIP.AUTO: NEGATIVE
PH UR STRIP: 5 [PH] (ref 5–8)
PROT UR STRIP-MCNC: ABNORMAL MG/DL
RBC #/AREA URNS HPF: ABNORMAL /HPF (ref 0–5)
SP GR UR REFRACTOMETRY: >1.03 (ref 1–1.03)
UROBILINOGEN UR QL STRIP.AUTO: 1 EU/DL (ref 0.2–1)
WBC URNS QL MICRO: ABNORMAL /HPF (ref 0–4)

## 2018-08-08 PROCEDURE — 87086 URINE CULTURE/COLONY COUNT: CPT | Performed by: PHYSICIAN ASSISTANT

## 2018-08-08 PROCEDURE — 81001 URINALYSIS AUTO W/SCOPE: CPT | Performed by: PHYSICIAN ASSISTANT

## 2018-08-08 RX ORDER — ACETAMINOPHEN 325 MG/1
1000 TABLET ORAL ONCE
Status: CANCELLED | OUTPATIENT
Start: 2018-08-08 | End: 2018-08-08

## 2018-08-08 RX ORDER — CELECOXIB 100 MG/1
400 CAPSULE ORAL ONCE
Status: CANCELLED | OUTPATIENT
Start: 2018-08-08 | End: 2018-08-08

## 2018-08-08 RX ORDER — PREGABALIN 25 MG/1
75 CAPSULE ORAL ONCE
Status: CANCELLED | OUTPATIENT
Start: 2018-08-08 | End: 2018-08-08

## 2018-08-08 RX ORDER — WARFARIN 1 MG/1
10 TABLET ORAL ONCE
Status: CANCELLED | OUTPATIENT
Start: 2018-08-08 | End: 2018-08-08

## 2018-08-08 NOTE — H&P
[address] [facility], [address] [facility]  [phone]           HISTORY & PHYSICAL      Patient: Monroe Hughes                MRN: [id number]       SSN: [id number]  YOB: 1950        AGE: 79 y.o. SEX: female  Body mass index is 31.02 kg/(m^2). PCP: Clara Simms MD  08/08/18      CC: right hip end stage OA  Problem List Items Addressed This Visit     None            HPI:  The patient is a pleasant 79 y.o. whom has end stage OA of their Right hip and has failed conservative treatment including but not limited to NSAIDS, cortisone injections, viscosupplementation, PT, and pain medicine. Due to the current findings and affected activities of daily living, surgical intervention is indicated. The alternatives, risks, complications, as well as expected outcome were discussed. These include but are not limited to infection, blood loss, need for blood transfusion, neurovascular damage, DVT, PE,  post-op stiffness and pain, leg length discrepancy, dislocation, anesthetic complications, prothesis longevity, need for more surgery, MI, stroke, and even death. The patient understands and wishes to proceed with surgery. 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)     RLQ - leakage from ileostomy    BMI 31.0-31.9,adult     31.8    Chronic pain     right leg pain; spine and hip down to ankle    Decreased exercise tolerance     mets <4 (SOB)    Depression     Diverticulitis     Fistula of enterostomy (HCC)     Hypertension     Incisional hernia     Sciatic pain     SOB (shortness of breath)          Current Outpatient Prescriptions:     lisinopril (PRINIVIL, ZESTRIL) 20 mg tablet, Take  by mouth daily. , Disp: , Rfl:     acetaminophen (TYLENOL) 650 mg TbER, Take 650 mg by mouth every eight (8) hours.  Indications: Arthritic Pain, Disp: , Rfl:     potassium chloride (KLOR-CON M20) 20 mEq tablet, Take  by mouth two (2) times a day., Disp: , Rfl:     furosemide (LASIX) 20 mg tablet, Take  by mouth two (2) times a day. Indications: Edema, Disp: , Rfl:     magnesium gluconate 500 mg (27 mg  elemental) tablet, Take  by mouth two (2) times a day., Disp: , Rfl:     cyanocobalamin (VITAMIN B-12) 1,000 mcg tablet, Take 1,000 mcg by mouth daily. , Disp: , Rfl:     MULTIVITAMIN WITH IRON (CHILDREN'S VITAMIN WITH IRON PO), Take 1 Tab by mouth daily. , Disp: , Rfl:     vitamin E (AQUA GEMS) 400 unit capsule, Take 400 Units by mouth., Disp: , Rfl:     HYDROcodone-acetaminophen (NORCO) 5-325 mg per tablet, Take 1-2 Tabs by mouth every six (6) hours as needed for Pain. Max Daily Amount: 8 Tabs., Disp: 35 Tab, Rfl: 0    oxyCODONE-acetaminophen (PERCOCET) 5-325 mg per tablet, Take 1-2 Tabs by mouth every four (4) hours as needed for Pain. Max Daily Amount: 12 Tabs., Disp: 40 Tab, Rfl: 0    Allergies   Allergen Reactions    Ancef [Cefazolin] Itching and Hives    Cefazolin Hives       Social History     Social History    Marital status:      Spouse name: N/A    Number of children: N/A    Years of education: N/A     Occupational History    Not on file.      Social History Main Topics    Smoking status: Former Smoker     Quit date: 1/1/2017    Smokeless tobacco: Never Used    Alcohol use No    Drug use: No    Sexual activity: Not on file     Other Topics Concern    Not on file     Social History Narrative    ** Merged History Encounter **            Past Surgical History:   Procedure Laterality Date    FLEXIBLE SIGMOIDOSCOPY N/A 4/26/2017    FLEXIBLE SIGMOIDOSCOPY  performed by Gilberto Wagner MD at [address] [facility]  04/2017    reversal of ileostomy    HX HEENT      tonsillectomy    HX HERNIA REPAIR      HX KNEE REPLACEMENT N/A     right elbow repair    HX KNEE REPLACEMENT Right     HX SMALL BOWEL RESECTION      ileostomy formation    HX QUENTIN AND BSO      HX TONSILLECTOMY      HX TUBAL LIGATION N/A     reopened tubes    HX TUBAL LIGATION      HX UROLOGICAL  03/01/2017    s/p Placement of bilateral double-J stents - 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 PE:  Visit Vitals    /59    Pulse (!) 58    Temp 98 °F (36.7 °C) (Oral)    Resp 16    Ht 5' 6\" (1.676 m)    Wt 192 lb 3.2 oz (87.2 kg)    SpO2 99%    BMI 31.02 kg/m2     A&O X3, NAD, well develop, well nourished  Heart: S1-S2, rrr  Lungs: CTA bilat  Abd: soft, nt, nt, + bs in all quadrants  Ext:  Pos distal pulses to DP, PT  Leg lengths show the right LE to be slightly longer grossly sitting in the chair. This was explained to the patient    X-ray: right hip shows end stage OA    Labs: labs were reviewed and wnl.  ua pending    A:  Right  hip end stage OA    P:  At this point we will move forward with surgery. Again, the alternatives, risks, complications, as well as expected outcome were discussed and the patient wishes to proceed with surgery. Pt has been instructed to stop aspirin, nsaids, rheumatologic medications and blood thinners. They have also been instructed to continue on any heart and bp meds and to take them the morning of surgery with sips of water. Lateral approach. The patient will require in patient admission due to above stated medical conditions as well the the surgical challenges given the anatomy and bone quality.          Rolan Lechuga

## 2018-08-11 LAB
BACTERIA SPEC CULT: NORMAL
SERVICE CMNT-IMP: NORMAL

## 2018-08-13 ENCOUNTER — ANESTHESIA EVENT (OUTPATIENT)
Dept: SURGERY | Age: 68
DRG: 470 | End: 2018-08-13
Payer: MEDICARE

## 2018-08-14 ENCOUNTER — APPOINTMENT (OUTPATIENT)
Dept: GENERAL RADIOLOGY | Age: 68
DRG: 470 | End: 2018-08-14
Attending: ORTHOPAEDIC SURGERY
Payer: MEDICARE

## 2018-08-14 ENCOUNTER — ANESTHESIA (OUTPATIENT)
Dept: SURGERY | Age: 68
DRG: 470 | End: 2018-08-14
Payer: MEDICARE

## 2018-08-14 ENCOUNTER — HOSPITAL ENCOUNTER (INPATIENT)
Age: 68
LOS: 1 days | Discharge: HOME HEALTH | DRG: 470 | End: 2018-08-15
Attending: ORTHOPAEDIC SURGERY | Admitting: ORTHOPAEDIC SURGERY
Payer: MEDICARE

## 2018-08-14 DIAGNOSIS — M16.10 HIP ARTHRITIS: Primary | ICD-10-CM

## 2018-08-14 PROCEDURE — 77030032490 HC SLV COMPR SCD KNE COVD -B: Performed by: ORTHOPAEDIC SURGERY

## 2018-08-14 PROCEDURE — 74011250636 HC RX REV CODE- 250/636: Performed by: NURSE ANESTHETIST, CERTIFIED REGISTERED

## 2018-08-14 PROCEDURE — 65270000029 HC RM PRIVATE

## 2018-08-14 PROCEDURE — 74011250636 HC RX REV CODE- 250/636: Performed by: ORTHOPAEDIC SURGERY

## 2018-08-14 PROCEDURE — 77030003029 HC SUT VCRL J&J -B: Performed by: ORTHOPAEDIC SURGERY

## 2018-08-14 PROCEDURE — 74011000258 HC RX REV CODE- 258

## 2018-08-14 PROCEDURE — 76060000034 HC ANESTHESIA 1.5 TO 2 HR: Performed by: ORTHOPAEDIC SURGERY

## 2018-08-14 PROCEDURE — C9290 INJ, BUPIVACAINE LIPOSOME: HCPCS | Performed by: PHYSICIAN ASSISTANT

## 2018-08-14 PROCEDURE — 74011250637 HC RX REV CODE- 250/637: Performed by: NURSE ANESTHETIST, CERTIFIED REGISTERED

## 2018-08-14 PROCEDURE — 76942 ECHO GUIDE FOR BIOPSY: CPT | Performed by: ANESTHESIOLOGY

## 2018-08-14 PROCEDURE — 77030031139 HC SUT VCRL2 J&J -A: Performed by: ORTHOPAEDIC SURGERY

## 2018-08-14 PROCEDURE — 77030008467 HC STPLR SKN COVD -B: Performed by: ORTHOPAEDIC SURGERY

## 2018-08-14 PROCEDURE — 74011250637 HC RX REV CODE- 250/637: Performed by: ORTHOPAEDIC SURGERY

## 2018-08-14 PROCEDURE — 77030013708 HC HNDPC SUC IRR PULS STRY –B: Performed by: ORTHOPAEDIC SURGERY

## 2018-08-14 PROCEDURE — 88304 TISSUE EXAM BY PATHOLOGIST: CPT | Performed by: ORTHOPAEDIC SURGERY

## 2018-08-14 PROCEDURE — 97161 PT EVAL LOW COMPLEX 20 MIN: CPT

## 2018-08-14 PROCEDURE — 77030018836 HC SOL IRR NACL ICUM -A: Performed by: ORTHOPAEDIC SURGERY

## 2018-08-14 PROCEDURE — C1776 JOINT DEVICE (IMPLANTABLE): HCPCS | Performed by: ORTHOPAEDIC SURGERY

## 2018-08-14 PROCEDURE — 77030020782 HC GWN BAIR PAWS FLX 3M -B: Performed by: ORTHOPAEDIC SURGERY

## 2018-08-14 PROCEDURE — 77030008683 HC TU ET CUF COVD -A: Performed by: ANESTHESIOLOGY

## 2018-08-14 PROCEDURE — 77030003601 HC NDL NRV BLK BBMI -A: Performed by: ORTHOPAEDIC SURGERY

## 2018-08-14 PROCEDURE — 74011250636 HC RX REV CODE- 250/636

## 2018-08-14 PROCEDURE — 77030018883 HC BLD SAW SAG4 STRY -B: Performed by: ORTHOPAEDIC SURGERY

## 2018-08-14 PROCEDURE — 77030038020 HC MANFLD NEPTUNE STRY -B: Performed by: ORTHOPAEDIC SURGERY

## 2018-08-14 PROCEDURE — 76210000016 HC OR PH I REC 1 TO 1.5 HR: Performed by: ORTHOPAEDIC SURGERY

## 2018-08-14 PROCEDURE — 74011000250 HC RX REV CODE- 250: Performed by: ORTHOPAEDIC SURGERY

## 2018-08-14 PROCEDURE — 74011000250 HC RX REV CODE- 250: Performed by: PHYSICIAN ASSISTANT

## 2018-08-14 PROCEDURE — 74011250636 HC RX REV CODE- 250/636: Performed by: PHYSICIAN ASSISTANT

## 2018-08-14 PROCEDURE — 88311 DECALCIFY TISSUE: CPT | Performed by: ORTHOPAEDIC SURGERY

## 2018-08-14 PROCEDURE — 77030012890: Performed by: ORTHOPAEDIC SURGERY

## 2018-08-14 PROCEDURE — 97116 GAIT TRAINING THERAPY: CPT

## 2018-08-14 PROCEDURE — 74011250637 HC RX REV CODE- 250/637: Performed by: PHYSICIAN ASSISTANT

## 2018-08-14 PROCEDURE — 77030012935 HC DRSG AQUACEL BMS -B: Performed by: ORTHOPAEDIC SURGERY

## 2018-08-14 PROCEDURE — 3E0T3BZ INTRODUCTION OF ANESTHETIC AGENT INTO PERIPHERAL NERVES AND PLEXI, PERCUTANEOUS APPROACH: ICD-10-PCS | Performed by: ANESTHESIOLOGY

## 2018-08-14 PROCEDURE — 77030018835 HC SOL IRR LR ICUM -A: Performed by: ORTHOPAEDIC SURGERY

## 2018-08-14 PROCEDURE — 76010000153 HC OR TIME 1.5 TO 2 HR: Performed by: ORTHOPAEDIC SURGERY

## 2018-08-14 PROCEDURE — 74011000258 HC RX REV CODE- 258: Performed by: ORTHOPAEDIC SURGERY

## 2018-08-14 PROCEDURE — 74011000250 HC RX REV CODE- 250

## 2018-08-14 PROCEDURE — 77030019557 HC ELECTRD VES SEAL MEDT -F: Performed by: ORTHOPAEDIC SURGERY

## 2018-08-14 PROCEDURE — 64520 N BLOCK LUMBAR/THORACIC: CPT | Performed by: ANESTHESIOLOGY

## 2018-08-14 PROCEDURE — 77030020294 HC ABD PLLW HIP DERY -B: Performed by: ORTHOPAEDIC SURGERY

## 2018-08-14 PROCEDURE — 74011000258 HC RX REV CODE- 258: Performed by: PHYSICIAN ASSISTANT

## 2018-08-14 PROCEDURE — 0SR90JA REPLACEMENT OF RIGHT HIP JOINT WITH SYNTHETIC SUBSTITUTE, UNCEMENTED, OPEN APPROACH: ICD-10-PCS | Performed by: ORTHOPAEDIC SURGERY

## 2018-08-14 PROCEDURE — 73502 X-RAY EXAM HIP UNI 2-3 VIEWS: CPT

## 2018-08-14 PROCEDURE — 77030002933 HC SUT MCRYL J&J -A: Performed by: ORTHOPAEDIC SURGERY

## 2018-08-14 DEVICE — PLUG BNE BK TI HA HMSPHR SLD FOR TRIDENT ACET SHELL DOME H: Type: IMPLANTABLE DEVICE | Site: HIP | Status: FUNCTIONAL

## 2018-08-14 DEVICE — STEM FEM SZ 5 127D 35X108X44MM -- ACCOLADE II V40: Type: IMPLANTABLE DEVICE | Site: HIP | Status: FUNCTIONAL

## 2018-08-14 DEVICE — COMPONENT HIP PRSS FT MTL ON CERM POLYETH X3: Type: IMPLANTABLE DEVICE | Site: HIP | Status: FUNCTIONAL

## 2018-08-14 DEVICE — INSERT ACET SZ E DIA36MM 0DEG X3 MTL ON POLY PRSS FIT PRI: Type: IMPLANTABLE DEVICE | Site: HIP | Status: FUNCTIONAL

## 2018-08-14 DEVICE — HEAD FEM DELT V40 -5MM NK 36MM -- V40 BIOLOX: Type: IMPLANTABLE DEVICE | Site: HIP | Status: FUNCTIONAL

## 2018-08-14 RX ORDER — WARFARIN 10 MG/1
10 TABLET ORAL ONCE
Status: COMPLETED | OUTPATIENT
Start: 2018-08-14 | End: 2018-08-14

## 2018-08-14 RX ORDER — SODIUM CHLORIDE 0.9 % (FLUSH) 0.9 %
5-10 SYRINGE (ML) INJECTION EVERY 8 HOURS
Status: DISCONTINUED | OUTPATIENT
Start: 2018-08-14 | End: 2018-08-14 | Stop reason: HOSPADM

## 2018-08-14 RX ORDER — PREGABALIN 75 MG/1
75 CAPSULE ORAL
Status: DISCONTINUED | OUTPATIENT
Start: 2018-08-14 | End: 2018-08-14 | Stop reason: HOSPADM

## 2018-08-14 RX ORDER — LIDOCAINE HYDROCHLORIDE 10 MG/ML
0.1 INJECTION, SOLUTION EPIDURAL; INFILTRATION; INTRACAUDAL; PERINEURAL AS NEEDED
Status: DISCONTINUED | OUTPATIENT
Start: 2018-08-14 | End: 2018-08-14 | Stop reason: HOSPADM

## 2018-08-14 RX ORDER — FENTANYL CITRATE 50 UG/ML
100 INJECTION, SOLUTION INTRAMUSCULAR; INTRAVENOUS ONCE
Status: DISCONTINUED | OUTPATIENT
Start: 2018-08-14 | End: 2018-08-14 | Stop reason: HOSPADM

## 2018-08-14 RX ORDER — SODIUM CHLORIDE 0.9 % (FLUSH) 0.9 %
5-10 SYRINGE (ML) INJECTION AS NEEDED
Status: DISCONTINUED | OUTPATIENT
Start: 2018-08-14 | End: 2018-08-14 | Stop reason: HOSPADM

## 2018-08-14 RX ORDER — ZOLPIDEM TARTRATE 5 MG/1
5 TABLET ORAL
Status: DISCONTINUED | OUTPATIENT
Start: 2018-08-14 | End: 2018-08-15 | Stop reason: HOSPADM

## 2018-08-14 RX ORDER — BUPIVACAINE HYDROCHLORIDE 5 MG/ML
INJECTION, SOLUTION EPIDURAL; INTRACAUDAL AS NEEDED
Status: DISCONTINUED | OUTPATIENT
Start: 2018-08-14 | End: 2018-08-14 | Stop reason: HOSPADM

## 2018-08-14 RX ORDER — ROPIVACAINE HYDROCHLORIDE 2 MG/ML
30 INJECTION, SOLUTION EPIDURAL; INFILTRATION; PERINEURAL
Status: COMPLETED | OUTPATIENT
Start: 2018-08-14 | End: 2018-08-14

## 2018-08-14 RX ORDER — MIDAZOLAM HYDROCHLORIDE 1 MG/ML
2 INJECTION, SOLUTION INTRAMUSCULAR; INTRAVENOUS ONCE
Status: COMPLETED | OUTPATIENT
Start: 2018-08-14 | End: 2018-08-14

## 2018-08-14 RX ORDER — VANCOMYCIN HYDROCHLORIDE 1 G/20ML
INJECTION, POWDER, LYOPHILIZED, FOR SOLUTION INTRAVENOUS AS NEEDED
Status: DISCONTINUED | OUTPATIENT
Start: 2018-08-14 | End: 2018-08-14 | Stop reason: HOSPADM

## 2018-08-14 RX ORDER — OXYCODONE HYDROCHLORIDE 15 MG/1
15 TABLET ORAL
Status: DISCONTINUED | OUTPATIENT
Start: 2018-08-14 | End: 2018-08-15 | Stop reason: HOSPADM

## 2018-08-14 RX ORDER — PREGABALIN 75 MG/1
75 CAPSULE ORAL ONCE
Status: DISCONTINUED | OUTPATIENT
Start: 2018-08-14 | End: 2018-08-14 | Stop reason: SDUPTHER

## 2018-08-14 RX ORDER — ONDANSETRON 2 MG/ML
4 INJECTION INTRAMUSCULAR; INTRAVENOUS
Status: DISCONTINUED | OUTPATIENT
Start: 2018-08-14 | End: 2018-08-14 | Stop reason: HOSPADM

## 2018-08-14 RX ORDER — INSULIN LISPRO 100 [IU]/ML
INJECTION, SOLUTION INTRAVENOUS; SUBCUTANEOUS ONCE
Status: DISCONTINUED | OUTPATIENT
Start: 2018-08-14 | End: 2018-08-14 | Stop reason: HOSPADM

## 2018-08-14 RX ORDER — ACETAMINOPHEN 500 MG
1000 TABLET ORAL EVERY 6 HOURS
Status: DISCONTINUED | OUTPATIENT
Start: 2018-08-14 | End: 2018-08-15 | Stop reason: HOSPADM

## 2018-08-14 RX ORDER — LANOLIN ALCOHOL/MO/W.PET/CERES
1 CREAM (GRAM) TOPICAL 2 TIMES DAILY WITH MEALS
Status: DISCONTINUED | OUTPATIENT
Start: 2018-08-14 | End: 2018-08-15

## 2018-08-14 RX ORDER — FENTANYL CITRATE 50 UG/ML
INJECTION, SOLUTION INTRAMUSCULAR; INTRAVENOUS AS NEEDED
Status: DISCONTINUED | OUTPATIENT
Start: 2018-08-14 | End: 2018-08-14 | Stop reason: HOSPADM

## 2018-08-14 RX ORDER — LIDOCAINE HYDROCHLORIDE 20 MG/ML
INJECTION, SOLUTION EPIDURAL; INFILTRATION; INTRACAUDAL; PERINEURAL AS NEEDED
Status: DISCONTINUED | OUTPATIENT
Start: 2018-08-14 | End: 2018-08-14 | Stop reason: HOSPADM

## 2018-08-14 RX ORDER — PROPOFOL 10 MG/ML
INJECTION, EMULSION INTRAVENOUS AS NEEDED
Status: DISCONTINUED | OUTPATIENT
Start: 2018-08-14 | End: 2018-08-14 | Stop reason: HOSPADM

## 2018-08-14 RX ORDER — CELECOXIB 400 MG/1
400 CAPSULE ORAL
Status: COMPLETED | OUTPATIENT
Start: 2018-08-14 | End: 2018-08-14

## 2018-08-14 RX ORDER — MORPHINE SULFATE 4 MG/ML
INJECTION, SOLUTION INTRAMUSCULAR; INTRAVENOUS AS NEEDED
Status: DISCONTINUED | OUTPATIENT
Start: 2018-08-14 | End: 2018-08-14 | Stop reason: HOSPADM

## 2018-08-14 RX ORDER — POLYMYXIN B 500000 [USP'U]/1
INJECTION, POWDER, LYOPHILIZED, FOR SOLUTION INTRAMUSCULAR; INTRATHECAL; INTRAVENOUS; OPHTHALMIC AS NEEDED
Status: DISCONTINUED | OUTPATIENT
Start: 2018-08-14 | End: 2018-08-14 | Stop reason: HOSPADM

## 2018-08-14 RX ORDER — KETOROLAC TROMETHAMINE 30 MG/ML
INJECTION, SOLUTION INTRAMUSCULAR; INTRAVENOUS AS NEEDED
Status: DISCONTINUED | OUTPATIENT
Start: 2018-08-14 | End: 2018-08-14 | Stop reason: HOSPADM

## 2018-08-14 RX ORDER — MORPHINE SULFATE 2 MG/ML
2 INJECTION, SOLUTION INTRAMUSCULAR; INTRAVENOUS
Status: DISCONTINUED | OUTPATIENT
Start: 2018-08-14 | End: 2018-08-14 | Stop reason: HOSPADM

## 2018-08-14 RX ORDER — ONDANSETRON 2 MG/ML
INJECTION INTRAMUSCULAR; INTRAVENOUS AS NEEDED
Status: DISCONTINUED | OUTPATIENT
Start: 2018-08-14 | End: 2018-08-14 | Stop reason: HOSPADM

## 2018-08-14 RX ORDER — FAMOTIDINE 20 MG/1
20 TABLET, FILM COATED ORAL ONCE
Status: COMPLETED | OUTPATIENT
Start: 2018-08-14 | End: 2018-08-14

## 2018-08-14 RX ORDER — NEOSTIGMINE METHYLSULFATE 1 MG/ML
INJECTION INTRAVENOUS AS NEEDED
Status: DISCONTINUED | OUTPATIENT
Start: 2018-08-14 | End: 2018-08-14 | Stop reason: HOSPADM

## 2018-08-14 RX ORDER — OXYCODONE HCL 20 MG/1
20 TABLET, FILM COATED, EXTENDED RELEASE ORAL ONCE
Status: DISCONTINUED | OUTPATIENT
Start: 2018-08-14 | End: 2018-08-14 | Stop reason: HOSPADM

## 2018-08-14 RX ORDER — SUCCINYLCHOLINE CHLORIDE 20 MG/ML
INJECTION INTRAMUSCULAR; INTRAVENOUS AS NEEDED
Status: DISCONTINUED | OUTPATIENT
Start: 2018-08-14 | End: 2018-08-14 | Stop reason: HOSPADM

## 2018-08-14 RX ORDER — GLYCOPYRROLATE 0.2 MG/ML
INJECTION INTRAMUSCULAR; INTRAVENOUS AS NEEDED
Status: DISCONTINUED | OUTPATIENT
Start: 2018-08-14 | End: 2018-08-14 | Stop reason: HOSPADM

## 2018-08-14 RX ORDER — CEFAZOLIN SODIUM IN 0.9 % NACL 2 G/100 ML
PLASTIC BAG, INJECTION (ML) INTRAVENOUS AS NEEDED
Status: DISCONTINUED | OUTPATIENT
Start: 2018-08-14 | End: 2018-08-14

## 2018-08-14 RX ORDER — SODIUM CHLORIDE, SODIUM LACTATE, POTASSIUM CHLORIDE, CALCIUM CHLORIDE 600; 310; 30; 20 MG/100ML; MG/100ML; MG/100ML; MG/100ML
75 INJECTION, SOLUTION INTRAVENOUS CONTINUOUS
Status: DISCONTINUED | OUTPATIENT
Start: 2018-08-14 | End: 2018-08-14 | Stop reason: HOSPADM

## 2018-08-14 RX ORDER — ACETAMINOPHEN 500 MG
1000 TABLET ORAL ONCE
Status: COMPLETED | OUTPATIENT
Start: 2018-08-14 | End: 2018-08-14

## 2018-08-14 RX ORDER — UREA 10 %
27 LOTION (ML) TOPICAL 2 TIMES DAILY
Status: DISCONTINUED | OUTPATIENT
Start: 2018-08-14 | End: 2018-08-15 | Stop reason: HOSPADM

## 2018-08-14 RX ORDER — DEXTROSE MONOHYDRATE AND SODIUM CHLORIDE 5; .45 G/100ML; G/100ML
100 INJECTION, SOLUTION INTRAVENOUS CONTINUOUS
Status: DISCONTINUED | OUTPATIENT
Start: 2018-08-14 | End: 2018-08-15

## 2018-08-14 RX ORDER — DEXAMETHASONE SODIUM PHOSPHATE 4 MG/ML
INJECTION, SOLUTION INTRA-ARTICULAR; INTRALESIONAL; INTRAMUSCULAR; INTRAVENOUS; SOFT TISSUE AS NEEDED
Status: DISCONTINUED | OUTPATIENT
Start: 2018-08-14 | End: 2018-08-14 | Stop reason: HOSPADM

## 2018-08-14 RX ORDER — ROCURONIUM BROMIDE 10 MG/ML
INJECTION, SOLUTION INTRAVENOUS AS NEEDED
Status: DISCONTINUED | OUTPATIENT
Start: 2018-08-14 | End: 2018-08-14 | Stop reason: HOSPADM

## 2018-08-14 RX ORDER — FENTANYL CITRATE 50 UG/ML
100 INJECTION, SOLUTION INTRAMUSCULAR; INTRAVENOUS ONCE
Status: COMPLETED | OUTPATIENT
Start: 2018-08-14 | End: 2018-08-14

## 2018-08-14 RX ORDER — FUROSEMIDE 20 MG/1
20 TABLET ORAL 2 TIMES DAILY
Status: DISCONTINUED | OUTPATIENT
Start: 2018-08-14 | End: 2018-08-15 | Stop reason: HOSPADM

## 2018-08-14 RX ORDER — EPINEPHRINE 1 MG/ML
INJECTION, SOLUTION, CONCENTRATE INTRAVENOUS AS NEEDED
Status: DISCONTINUED | OUTPATIENT
Start: 2018-08-14 | End: 2018-08-14 | Stop reason: HOSPADM

## 2018-08-14 RX ORDER — NALOXONE HYDROCHLORIDE 0.4 MG/ML
0.4 INJECTION, SOLUTION INTRAMUSCULAR; INTRAVENOUS; SUBCUTANEOUS AS NEEDED
Status: DISCONTINUED | OUTPATIENT
Start: 2018-08-14 | End: 2018-08-15 | Stop reason: HOSPADM

## 2018-08-14 RX ORDER — SODIUM CHLORIDE 0.9 % (FLUSH) 0.9 %
5-10 SYRINGE (ML) INJECTION EVERY 8 HOURS
Status: DISCONTINUED | OUTPATIENT
Start: 2018-08-14 | End: 2018-08-15 | Stop reason: HOSPADM

## 2018-08-14 RX ORDER — HYDRALAZINE HYDROCHLORIDE 20 MG/ML
INJECTION INTRAMUSCULAR; INTRAVENOUS AS NEEDED
Status: DISCONTINUED | OUTPATIENT
Start: 2018-08-14 | End: 2018-08-14 | Stop reason: HOSPADM

## 2018-08-14 RX ORDER — CELECOXIB 100 MG/1
200 CAPSULE ORAL 2 TIMES DAILY
Status: DISCONTINUED | OUTPATIENT
Start: 2018-08-14 | End: 2018-08-15 | Stop reason: HOSPADM

## 2018-08-14 RX ORDER — ADHESIVE BANDAGE
30 BANDAGE TOPICAL DAILY
Status: DISCONTINUED | OUTPATIENT
Start: 2018-08-15 | End: 2018-08-15 | Stop reason: HOSPADM

## 2018-08-14 RX ORDER — PREGABALIN 50 MG/1
50 CAPSULE ORAL 2 TIMES DAILY
Status: DISCONTINUED | OUTPATIENT
Start: 2018-08-14 | End: 2018-08-15 | Stop reason: HOSPADM

## 2018-08-14 RX ORDER — DIPHENHYDRAMINE HCL 25 MG
25 CAPSULE ORAL
Status: DISCONTINUED | OUTPATIENT
Start: 2018-08-14 | End: 2018-08-15 | Stop reason: HOSPADM

## 2018-08-14 RX ORDER — SODIUM CHLORIDE 0.9 % (FLUSH) 0.9 %
5-10 SYRINGE (ML) INJECTION AS NEEDED
Status: DISCONTINUED | OUTPATIENT
Start: 2018-08-14 | End: 2018-08-15 | Stop reason: HOSPADM

## 2018-08-14 RX ORDER — ONDANSETRON 2 MG/ML
4 INJECTION INTRAMUSCULAR; INTRAVENOUS
Status: DISCONTINUED | OUTPATIENT
Start: 2018-08-14 | End: 2018-08-15 | Stop reason: HOSPADM

## 2018-08-14 RX ORDER — CELECOXIB 400 MG/1
400 CAPSULE ORAL ONCE
Status: DISCONTINUED | OUTPATIENT
Start: 2018-08-14 | End: 2018-08-14 | Stop reason: SDUPTHER

## 2018-08-14 RX ORDER — POTASSIUM CHLORIDE 20 MEQ/1
20 TABLET, EXTENDED RELEASE ORAL 2 TIMES DAILY
Status: DISCONTINUED | OUTPATIENT
Start: 2018-08-14 | End: 2018-08-15 | Stop reason: HOSPADM

## 2018-08-14 RX ORDER — LISINOPRIL 20 MG/1
20 TABLET ORAL DAILY
Status: DISCONTINUED | OUTPATIENT
Start: 2018-08-15 | End: 2018-08-15 | Stop reason: HOSPADM

## 2018-08-14 RX ORDER — FENTANYL CITRATE 50 UG/ML
INJECTION, SOLUTION INTRAMUSCULAR; INTRAVENOUS
Status: COMPLETED
Start: 2018-08-14 | End: 2018-08-14

## 2018-08-14 RX ORDER — ASPIRIN 325 MG/1
325 TABLET, FILM COATED ORAL 2 TIMES DAILY
Status: DISCONTINUED | OUTPATIENT
Start: 2018-08-15 | End: 2018-08-15 | Stop reason: HOSPADM

## 2018-08-14 RX ADMIN — Medication 10 ML: at 21:25

## 2018-08-14 RX ADMIN — LIDOCAINE HYDROCHLORIDE 60 MG: 20 INJECTION, SOLUTION EPIDURAL; INFILTRATION; INTRACAUDAL; PERINEURAL at 10:44

## 2018-08-14 RX ADMIN — ACETAMINOPHEN 1000 MG: 500 TABLET, FILM COATED ORAL at 09:39

## 2018-08-14 RX ADMIN — HYDRALAZINE HYDROCHLORIDE 5 MG: 20 INJECTION INTRAMUSCULAR; INTRAVENOUS at 11:21

## 2018-08-14 RX ADMIN — DEXTROSE MONOHYDRATE AND SODIUM CHLORIDE 100 ML/HR: 5; .45 INJECTION, SOLUTION INTRAVENOUS at 15:30

## 2018-08-14 RX ADMIN — POTASSIUM CHLORIDE 20 MEQ: 20 TABLET, EXTENDED RELEASE ORAL at 18:44

## 2018-08-14 RX ADMIN — ROCURONIUM BROMIDE 40 MG: 10 INJECTION, SOLUTION INTRAVENOUS at 10:52

## 2018-08-14 RX ADMIN — DEXAMETHASONE SODIUM PHOSPHATE 4 MG: 4 INJECTION, SOLUTION INTRA-ARTICULAR; INTRALESIONAL; INTRAMUSCULAR; INTRAVENOUS; SOFT TISSUE at 11:00

## 2018-08-14 RX ADMIN — PROPOFOL 150 MG: 10 INJECTION, EMULSION INTRAVENOUS at 10:44

## 2018-08-14 RX ADMIN — FERROUS SULFATE TAB 325 MG (65 MG ELEMENTAL FE) 325 MG: 325 (65 FE) TAB at 16:42

## 2018-08-14 RX ADMIN — OXYCODONE HYDROCHLORIDE 15 MG: 15 TABLET ORAL at 18:44

## 2018-08-14 RX ADMIN — Medication 10 ML: at 15:30

## 2018-08-14 RX ADMIN — SODIUM CHLORIDE 600 MG: 900 INJECTION, SOLUTION INTRAVENOUS at 10:54

## 2018-08-14 RX ADMIN — FENTANYL CITRATE 100 MCG: 50 INJECTION INTRAMUSCULAR; INTRAVENOUS at 09:49

## 2018-08-14 RX ADMIN — WARFARIN SODIUM 10 MG: 10 TABLET ORAL at 10:11

## 2018-08-14 RX ADMIN — Medication 27 MG: at 18:51

## 2018-08-14 RX ADMIN — GLYCOPYRROLATE 0.2 MG: 0.2 INJECTION INTRAMUSCULAR; INTRAVENOUS at 12:15

## 2018-08-14 RX ADMIN — MORPHINE SULFATE 2 MG: 4 INJECTION, SOLUTION INTRAMUSCULAR; INTRAVENOUS at 11:07

## 2018-08-14 RX ADMIN — FAMOTIDINE 20 MG: 20 TABLET ORAL at 09:39

## 2018-08-14 RX ADMIN — CELECOXIB 400 MG: 400 CAPSULE ORAL at 09:39

## 2018-08-14 RX ADMIN — TRANEXAMIC ACID 1 G: 100 INJECTION, SOLUTION INTRAVENOUS at 12:07

## 2018-08-14 RX ADMIN — ONDANSETRON 4 MG: 2 INJECTION INTRAMUSCULAR; INTRAVENOUS at 12:10

## 2018-08-14 RX ADMIN — MIDAZOLAM HYDROCHLORIDE 2 MG: 1 INJECTION, SOLUTION INTRAMUSCULAR; INTRAVENOUS at 09:49

## 2018-08-14 RX ADMIN — PREGABALIN 50 MG: 50 CAPSULE ORAL at 15:30

## 2018-08-14 RX ADMIN — SODIUM CHLORIDE, SODIUM LACTATE, POTASSIUM CHLORIDE, AND CALCIUM CHLORIDE: 600; 310; 30; 20 INJECTION, SOLUTION INTRAVENOUS at 10:33

## 2018-08-14 RX ADMIN — GLYCOPYRROLATE 0.2 MG: 0.2 INJECTION INTRAMUSCULAR; INTRAVENOUS at 11:11

## 2018-08-14 RX ADMIN — SUCCINYLCHOLINE CHLORIDE 140 MG: 20 INJECTION INTRAMUSCULAR; INTRAVENOUS at 10:44

## 2018-08-14 RX ADMIN — FENTANYL CITRATE 50 MCG: 50 INJECTION, SOLUTION INTRAMUSCULAR; INTRAVENOUS at 10:54

## 2018-08-14 RX ADMIN — MORPHINE SULFATE 4 MG: 4 INJECTION, SOLUTION INTRAMUSCULAR; INTRAVENOUS at 11:19

## 2018-08-14 RX ADMIN — ONDANSETRON 4 MG: 2 INJECTION INTRAMUSCULAR; INTRAVENOUS at 10:37

## 2018-08-14 RX ADMIN — LIDOCAINE HYDROCHLORIDE 40 MG: 20 INJECTION, SOLUTION EPIDURAL; INFILTRATION; INTRACAUDAL; PERINEURAL at 10:55

## 2018-08-14 RX ADMIN — FENTANYL CITRATE 100 MCG: 50 INJECTION INTRAMUSCULAR; INTRAVENOUS at 10:02

## 2018-08-14 RX ADMIN — ROPIVACAINE HYDROCHLORIDE 60 MG: 2 INJECTION, SOLUTION EPIDURAL; INFILTRATION at 10:08

## 2018-08-14 RX ADMIN — MORPHINE SULFATE 2 MG: 4 INJECTION, SOLUTION INTRAMUSCULAR; INTRAVENOUS at 11:15

## 2018-08-14 RX ADMIN — NEOSTIGMINE METHYLSULFATE 2 MG: 1 INJECTION INTRAVENOUS at 12:15

## 2018-08-14 RX ADMIN — HYDRALAZINE HYDROCHLORIDE 5 MG: 20 INJECTION INTRAMUSCULAR; INTRAVENOUS at 11:26

## 2018-08-14 RX ADMIN — TRANEXAMIC ACID 1 G: 100 INJECTION, SOLUTION INTRAVENOUS at 11:00

## 2018-08-14 RX ADMIN — HYDRALAZINE HYDROCHLORIDE 5 MG: 20 INJECTION INTRAMUSCULAR; INTRAVENOUS at 11:24

## 2018-08-14 RX ADMIN — HYDRALAZINE HYDROCHLORIDE 5 MG: 20 INJECTION INTRAMUSCULAR; INTRAVENOUS at 11:18

## 2018-08-14 RX ADMIN — CLINDAMYCIN PHOSPHATE 900 MG: 150 INJECTION, SOLUTION INTRAVENOUS at 18:39

## 2018-08-14 RX ADMIN — FENTANYL CITRATE 50 MCG: 50 INJECTION, SOLUTION INTRAMUSCULAR; INTRAVENOUS at 10:39

## 2018-08-14 RX ADMIN — CELECOXIB 200 MG: 100 CAPSULE ORAL at 18:44

## 2018-08-14 RX ADMIN — LIDOCAINE HYDROCHLORIDE 2 ML: 10 INJECTION, SOLUTION EPIDURAL; INFILTRATION; INTRACAUDAL; PERINEURAL at 09:51

## 2018-08-14 RX ADMIN — PROPOFOL 50 MG: 10 INJECTION, EMULSION INTRAVENOUS at 10:55

## 2018-08-14 RX ADMIN — ACETAMINOPHEN 1000 MG: 500 TABLET, FILM COATED ORAL at 18:44

## 2018-08-14 NOTE — PERIOP NOTES
TRANSFER - OUT REPORT:    Verbal report given to Tr Apodaca RN on [employer]   being transferred to [facility] for routine post - op       Report consisted of patients Situation, Background, Assessment and   Recommendations(SBAR). Information from the following report(s) SBAR, OR Summary, Procedure Summary, Intake/Output, MAR and Recent Results was reviewed with the receiving nurse. Lines:   Peripheral IV 08/14/18 Right Wrist (Active)   Site Assessment Clean, dry, & intact 8/14/2018 12:38 PM   Phlebitis Assessment 0 8/14/2018 12:38 PM   Infiltration Assessment 0 8/14/2018 12:38 PM   Dressing Status Clean, dry, & intact 8/14/2018 12:38 PM   Dressing Type Transparent 8/14/2018 12:38 PM   Hub Color/Line Status Pink; Infusing;Patent 8/14/2018 12:38 PM        Opportunity for questions and clarification was provided.       Patient transported with:   Registered Nurse  Tech

## 2018-08-14 NOTE — PROGRESS NOTES
Problem: Mobility Impaired (Adult and Pediatric)  Goal: *Acute Goals and Plan of Care (Insert Text)  Physical Therapy Goals  Initiated 8/14/2018 and to be accomplished within 7 day(s)  1. Patient will move from supine to sit and sit to supine , scoot up and down and roll side to side in bed with supervision/set-up. 2.  Patient will transfer from bed to chair and chair to bed with supervision/set-up using the least restrictive device. 3.  Patient will perform sit to stand with supervision/set-up. 4.  Patient will ambulate with supervision/set-up for >150 feet with the least restrictive device. 5.  Patient will ascend/descend 3 stairs with 0 handrail(s) with minimal assistance/contact guard assist and LRAD as well as 10 stairs with 1 handrail min/CGA LRAD. Outcome: Progressing Towards Goal  physical Therapy EVALUATION    Patient: Chioma Rogers (29 y.o. female)  Date: 8/14/2018  Primary Diagnosis: Osteoarthritis of right hip, unspecified osteoarthritis type [M16.11]  Procedure(s) (LRB):  RIGHT TOTAL HIP ARTHROPLASTY LATERAL APPROACH (Right) Day of Surgery   Precautions:   Fall, Total hip, WBAT    ASSESSMENT :  PT orders received and patient cleared by nursing to participate with therapy. Patient is a 79 y.o. female admitted to the hospital due to [facility] Dr. Stefanie Lechuga 8/14/18. Patient consents to PT evaluation and treatment. Pt educated on total hip precautions, WB status, ankle pumps, and OOB 3-5 times a day with nursing assistance. Pt has decreased sensation noted to R LE especially in foot as well as decreased ankle DF on R LE. Pt performed supine to sit CGA. Pt performed sit to stands CGA. Ambulated 15 feet in room with RW min/CGA with additional time needed. Pt required cues for sequencing and safety. Pt ended therapy sitting up in recliner with blanket roll between LE and all needs met.      Patient will benefit from skilled intervention to address the above impairments and increase functional independence. Patients rehabilitation potential is considered to be Good  Factors which may influence rehabilitation potential include:   []         None noted  []         Mental ability/status  []         Medical condition  []         Home/family situation and support systems  []         Safety awareness  []         Pain tolerance/management  []         Other:      PLAN :  Recommendations and Planned Interventions:  [x]           Bed Mobility Training             [x]    Neuromuscular Re-Education  [x]           Transfer Training                   []    Orthotic/Prosthetic Training  [x]           Gait Training                          []    Modalities  [x]           Therapeutic Exercises          []    Edema Management/Control  [x]           Therapeutic Activities            [x]    Patient and Family Training/Education  []           Other (comment):    Frequency/Duration: Patient will be followed by physical therapy 1-2 times per day to address goals. Discharge Recommendations: Home Health  Further Equipment Recommendations for Discharge: Pt has RW and BSC     SUBJECTIVE:   Patient stated Yes, but I hate them (stating she has a cane).  \"I have a lot of other issues with my back, piriformis, knee. ... but I will try my best\"    OBJECTIVE DATA SUMMARY:     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)     RLQ - leakage from ileostomy    BMI 31.0-31.9,adult     31.8    Chronic pain     right leg pain; spine and hip down to ankle    Decreased exercise tolerance     mets <4 (SOB)    Depression     Diverticulitis     Fistula of enterostomy (HCC)     Hypertension     Incisional hernia     Sciatic pain     SOB (shortness of breath)      Past Surgical History:   Procedure Laterality Date    FLEXIBLE SIGMOIDOSCOPY N/A 4/26/2017    FLEXIBLE SIGMOIDOSCOPY  performed by Garth Sigala MD at [address] [facility]  04/2017    reversal of ileostomy    HX HEENT tonsillectomy    HX HERNIA REPAIR      HX KNEE REPLACEMENT N/A     right elbow repair    HX KNEE REPLACEMENT Right     HX SMALL BOWEL RESECTION      ileostomy formation    HX QUENTIN AND BSO      HX TONSILLECTOMY      HX TUBAL LIGATION N/A     reopened tubes    HX TUBAL LIGATION      HX UROLOGICAL  03/01/2017    s/p Placement of bilateral double-J stents - 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 Pt lives with her  but states he does not help much at home. Home Situation  Home Environment: Private residence  # Steps to Enter: 3  Rails to Enter: No  One/Two Story Residence: Two story  # of Interior Steps: 15  Lift Chair Available: No  Living Alone: No  Support Systems: Spouse/Significant Other/Partner  Patient Expects to be Discharged to[de-identified] Private residence  Current DME Used/Available at Home: [address], [place] [address], straight, Commode, bedside, Shower chair  Critical Behavior:  Neurologic State: Alert  Psychosocial  Patient Behaviors: Calm; Cooperative  Family  Behaviors: Calm; Cooperative  Purposeful Interaction: Yes  Pt Identified Daily Priority: Clinical issues (comment)  Caritas Process: Nurture loving kindness;Establish trust  Caring Interventions: Reassure; Therapeutic modalities  Reassure: Therapeutic listening; Informing;Caring rounds  Therapeutic Modalities: Humor; Intentional therapeutic touch  Strength:    Strength:  (B LE L 4+ to 5/5, R 3- to 3/5)  Tone & Sensation:   Tone: Normal (B LE)  Sensation:  (B LE, L WFL, R impaired )   Range Of Motion:  AROM:  (B LE WFL excepted limited R hip)  Functional Mobility:  Bed Mobility:  Supine to Sit: Contact guard assistance  Transfers:  Sit to Stand: Contact guard assistance  Stand to Sit: Contact guard assistance  Balance:   Sitting: Impaired  Sitting - Static: Good (unsupported)  Sitting - Dynamic: Fair (occasional)  Standing: Impaired; With support  Standing - Static: Fair  Standing - Dynamic : Fair  Ambulation/Gait Training:  Distance (ft): 15 Feet (ft)  Assistive Device: Walker, rolling  Ambulation - Level of Assistance: Contact guard assistance;Minimal assistance  Base of Support: Shift to left  Speed/Temi: Pace decreased (<100 feet/min)  Therapeutic Exercises:   Reviewed ankle pumps  Pain:  Pre: 1-2/10 R hip  Post: 1-2/10 R hip  Activity Tolerance:   fair  Please refer to the flowsheet for vital signs taken during this treatment. After treatment:   [x] Patient left in no apparent distress sitting up in chair  [] Patient left sitting on EOB  [] Patient left in no apparent distress in bed  [] Patient declined to be OOB at this time due to    [x] Call bell left within reach  [x] Nursing notified(Renetta)  [x] Caregiver present  [] Bed alarm activated  [x] Personal items in reach     COMMUNICATION/EDUCATION:   [x]         Fall prevention education was provided and the patient/caregiver indicated understanding. [x]         Patient/family have participated as able in goal setting and plan of care. [x]         Patient/family agree to work toward stated goals and plan of care. []         Patient understands intent and goals of therapy, but is neutral about his/her participation. []         Patient is unable to participate in goal setting and plan of care. Thank you for this referral.  Lili Comas, PT, DPT   Time Calculation: 33 mins      Mobility  Current  CJ= 20-39%   Goal  CI= 1-19%. The severity rating is based on the Level of Assistance required for Functional Mobility and ADLs.     Eval Complexity: History: MEDIUM  Complexity : 1-2 comorbidities / personal factors will impact the outcome/ POC Exam:HIGH Complexity : 4+ Standardized tests and measures addressing body structure, function, activity limitation and / or participation in recreation  Presentation: LOW Complexity : Stable, uncomplicated  Clinical Decision Making:Low Complexity   Overall Complexity:LOW

## 2018-08-14 NOTE — IP AVS SNAPSHOT
[address] Patient: Hannah Camacho MRN: [id number] NFW:[id number] A check jose j indicates which time of day the medication should be taken. My Medications START taking these medications Instructions Each Dose to Equal  
 Morning Noon Evening Bedtime  
 buffered aspirin 325 mg tablet Commonly known as:  BUFFERIN Your last dose was: Your next dose is: Take 1 Tab by mouth two (2) times a day. 325 mg  
    
   
   
   
  
 celecoxib 200 mg capsule Commonly known as:  CELEBREX Your last dose was: Your next dose is: Take 1 Cap by mouth two (2) times a day for 90 days. 200 mg  
    
   
   
   
  
 ferrous sulfate 325 mg (65 mg iron) EC tablet Commonly known as:  IRON Your last dose was: Your next dose is: Take 1 Tab by mouth daily. 325 mg  
    
   
   
   
  
 oxyCODONE IR 15 mg immediate release tablet Commonly known as:  OXY-IR Your last dose was: Your next dose is: Take 1 Tab by mouth every three (3) hours as needed. Max Daily Amount: 120 mg.  
 15 mg CONTINUE taking these medications Instructions Each Dose to Equal  
 Morning Noon Evening Bedtime  
 acetaminophen 650 mg Tber Commonly known as:  TYLENOL Your last dose was: Your next dose is: Take 650 mg by mouth every eight (8) hours. Indications: Arthritic Pain 650 mg  
    
   
   
   
  
 CHILDREN'S VITAMIN WITH IRON PO Your last dose was: Your next dose is: Take 1 Tab by mouth daily. 1 Tab  
    
   
   
   
  
 furosemide 20 mg tablet Commonly known as:  LASIX Your last dose was: Your next dose is: Take  by mouth two (2) times a day. Indications: Edema KLOR-CON M20 20 mEq tablet Generic drug:  potassium chloride Your last dose was: Your next dose is: Take  by mouth two (2) times a day. lisinopril 20 mg tablet Commonly known as:  Luci Antonieta Your last dose was: Your next dose is: Take  by mouth daily. magnesium gluconate 500 mg (27 mg  elemental) tablet Your last dose was: Your next dose is: Take  by mouth two (2) times a day. VITAMIN B-12 1,000 mcg tablet Generic drug:  cyanocobalamin Your last dose was: Your next dose is: Take 1,000 mcg by mouth daily. 1000 mcg  
    
   
   
   
  
 vitamin E 400 unit capsule Commonly known as:  [address] Your last dose was: Your next dose is: Take 400 Units by mouth. 400 Units STOP taking these medications HYDROcodone-acetaminophen 5-325 mg per tablet Commonly known as:  NORCO  
   
  
 oxyCODONE-acetaminophen 5-325 mg per tablet Commonly known as:  PERCOCET Where to Get Your Medications Information on where to get these meds will be given to you by the nurse or doctor. ! Ask your nurse or doctor about these medications  
  buffered aspirin 325 mg tablet  
 celecoxib 200 mg capsule  
 ferrous sulfate 325 mg (65 mg iron) EC tablet  
 oxyCODONE IR 15 mg immediate release tablet

## 2018-08-14 NOTE — ROUTINE PROCESS
Bedside and Verbal shift change report given to Thiago Jay (oncoming nurse) by Yana Calhoun (offgoing nurse). Report included the following information SBAR, Kardex, Intake/Output and MAR.

## 2018-08-14 NOTE — INTERVAL H&P NOTE
H&P Update:  Seble VASQUEZ Abel Wiley was seen and examined. History and physical has been reviewed. The patient has been examined.  There have been no significant clinical changes since the completion of the originally dated History and Physical.    Signed By: Vivica Klinefelter, MD     August 14, 2018 9:31 AM

## 2018-08-14 NOTE — IP AVS SNAPSHOT
[address] Patient: [employer]  MRN: [id number] About your hospitalization You were admitted on:  August 14, 2018 You last received care in the:  [facility] 1978 You were discharged on:  August 15, 2018 Why you were hospitalized Your primary diagnosis was:  Not on File Your diagnoses also included: Hip Arthritis Follow-up Information Follow up With Details Comments Contact Info Griselda Handing, MD Go on 8/30/2018 Appointment with JASEN Toro at 10:35. [place] [id number] 
[phone] Preston Martinez MD  Per office patient only needs to f/u with surgeon. [address] [place] [address] [address] [place] [address] 
[phone] Your Scheduled Appointments Thursday August 30, 2018 10:35 AM EDT  
POST OP with Rosario Hunt PA-C  
[facility] - [address] [address]  
[phone] Discharge Orders Procedure Order Date Status Priority Quantity Spec Type Associated Dx WALKER STANDARD 08/15/18 0819 Normal Routine 1  Hip arthritis [[id number]] ELEVATED TOILET SEAT 08/15/18 0819 Normal Routine 1  Hip arthritis [[id number]] COMMODE CHAIR 08/15/18 0819 Normal Routine 1  Hip arthritis [[id number]] SHOWER CHAIR 08/15/18 0819 Normal Routine 1  Hip arthritis [[id number]] [address] 08/15/18 0819 Normal Routine 1  Hip arthritis [[id number]] Comments: Total hip protocol, wbat Aspirin therapy 
aquacel ag dressing pod 7 and prn A check jose j indicates which time of day the medication should be taken. My Medications START taking these medications Instructions Each Dose to Equal  
 Morning Noon Evening Bedtime  
 buffered aspirin 325 mg tablet Commonly known as:  BUFFERIN Your last dose was: Your next dose is: Take 1 Tab by mouth two (2) times a day. 325 mg  
    
   
   
   
  
 celecoxib 200 mg capsule Commonly known as:  CELEBREX Your last dose was: Your next dose is: Take 1 Cap by mouth two (2) times a day for 90 days. 200 mg  
    
   
   
   
  
 ferrous sulfate 325 mg (65 mg iron) EC tablet Commonly known as:  IRON Your last dose was: Your next dose is: Take 1 Tab by mouth daily. 325 mg  
    
   
   
   
  
 oxyCODONE IR 15 mg immediate release tablet Commonly known as:  OXY-IR Your last dose was: Your next dose is: Take 1 Tab by mouth every three (3) hours as needed. Max Daily Amount: 120 mg.  
 15 mg CONTINUE taking these medications Instructions Each Dose to Equal  
 Morning Noon Evening Bedtime  
 acetaminophen 650 mg Tber Commonly known as:  TYLENOL Your last dose was: Your next dose is: Take 650 mg by mouth every eight (8) hours. Indications: Arthritic Pain 650 mg  
    
   
   
   
  
 CHILDREN'S VITAMIN WITH IRON PO Your last dose was: Your next dose is: Take 1 Tab by mouth daily. 1 Tab  
    
   
   
   
  
 furosemide 20 mg tablet Commonly known as:  LASIX Your last dose was: Your next dose is: Take  by mouth two (2) times a day. Indications: Edema KLOR-CON M20 20 mEq tablet Generic drug:  potassium chloride Your last dose was: Your next dose is: Take  by mouth two (2) times a day. lisinopril 20 mg tablet Commonly known as:  Nithya Gaytan Your last dose was: Your next dose is: Take  by mouth daily. magnesium gluconate 500 mg (27 mg  elemental) tablet Your last dose was: Your next dose is: Take  by mouth two (2) times a day. VITAMIN B-12 1,000 mcg tablet Generic drug:  cyanocobalamin Your last dose was: Your next dose is: Take 1,000 mcg by mouth daily. 1000 mcg  
    
   
   
   
  
 vitamin E 400 unit capsule Commonly known as:  [address] Your last dose was: Your next dose is: Take 400 Units by mouth. 400 Units STOP taking these medications HYDROcodone-acetaminophen 5-325 mg per tablet Commonly known as:  NORCO  
   
  
 oxyCODONE-acetaminophen 5-325 mg per tablet Commonly known as:  PERCOCET Where to Get Your Medications Information on where to get these meds will be given to you by the nurse or doctor. ! Ask your nurse or doctor about these medications  
  buffered aspirin 325 mg tablet  
 celecoxib 200 mg capsule  
 ferrous sulfate 325 mg (65 mg iron) EC tablet  
 oxyCODONE IR 15 mg immediate release tablet Opioid Education Prescription Opioids: What You Need to Know: 
 
Prescription opioids can be used to help relieve moderate-to-severe pain and are often prescribed following a surgery or injury, or for certain health conditions. These medications can be an important part of treatment but also come with serious risks. Opioids are strong pain medicines. Examples include hydrocodone, oxycodone, fentanyl, and morphine. Heroin is an example of an illegal opioid. It is important to work with your health care provider to make sure you are getting the safest, most effective care. WHAT ARE THE RISKS AND SIDE EFFECTS OF OPIOID USE? Prescription opioids carry serious risks of addiction and overdose, especially with prolonged use. An opioid overdose, often marked by slow breathing, can cause sudden death. The use of prescription opioids can have a number of side effects as well, even when taken as directed. · Tolerance-meaning you might need to take more of a medication for the same pain relief · Physical dependence-meaning you have symptoms of withdrawal when the medication is stopped. Withdrawal symptoms can include nausea, sweating, chills, diarrhea, stomach cramps, and muscle aches. Withdrawal can last up to several weeks, depending on which drug you took and how long you took it. · Increased sensitivity to pain · Constipation · Nausea, vomiting, and dry mouth · Sleepiness and dizziness · Confusion · Depression · Low levels of testosterone that can result in lower sex drive, energy, and strength · Itching and sweating RISKS ARE GREATER WITH:      
· History of drug misuse, substance use disorder, or overdose · Mental health conditions (such as depression or anxiety) · Sleep apnea · Older age (72 years or older) · Pregnancy Avoid alcohol while taking prescription opioids. Also, unless specifically advised by your health care provider, medications to avoid include: · Benzodiazepines (such as Xanax or Valium) · Muscle relaxants (such as Soma or Flexeril) · Hypnotics (such as Ambien or Lunesta) · Other prescription opioids KNOW YOUR OPTIONS Talk to your health care provider about ways to manage your pain that don't involve prescription opioids. Some of these options may actually work better and have fewer risks and side effects. Options may include: 
· Pain relievers such as acetaminophen, ibuprofen, and naproxen · Some medications that are also used for depression or seizures · Physical therapy and exercise · Counseling to help patients learn how to cope better with triggers of pain and stress. · Application of heat or cold compress · Massage therapy · Relaxation techniques Be Informed Make sure you know the name of your medication, how much and how often to take it, and its potential risks & side effects.  
 
IF YOU ARE PRESCRIBED OPIOIDS FOR PAIN: 
 · Never take opioids in greater amounts or more often than prescribed. Remember the goal is not to be pain-free but to manage your pain at a tolerable level. · Follow up with your primary care provider to: · Work together to create a plan on how to manage your pain. · Talk about ways to help manage your pain that don't involve prescription opioids. · Talk about any and all concerns and side effects. · Help prevent misuse and abuse. · Never sell or share prescription opioids · Help prevent misuse and abuse. · Store prescription opioids in a secure place and out of reach of others (this may include visitors, children, friends, and family). · Safely dispose of unused/unwanted prescription opioids: Find your community drug take-back program or your pharmacy mail-back program, or flush them down the toilet, following guidance from the Food and Drug Administration (www.fda.gov/Drugs/ResourcesForYou). · Visit www.cdc.gov/drugoverdose to learn about the risks of opioid abuse and overdose. · If you believe you may be struggling with addiction, tell your health care provider and ask for guidance or call [address] [place] [address]"[employer] at [phone]. Discharge Instructions DISCHARGE SUMMARY from Nurse PATIENT INSTRUCTIONS: 
 
After general anesthesia or intravenous sedation, for 24 hours or while taking prescription Narcotics: · Limit your activities · Do not drive and operate hazardous machinery · Do not make important personal or business decisions · Do  not drink alcoholic beverages · If you have not urinated within 8 hours after discharge, please contact your surgeon on call. Report the following to your surgeon: 
· Excessive pain, swelling, redness or odor of or around the surgical area · Temperature over 100.5 · Nausea and vomiting lasting longer than 4 hours or if unable to take medications · Any signs of decreased circulation or nerve impairment to extremity: change in color, persistent  numbness, tingling, coldness or increase pain · Any questions What to do at Home: 
Recommended activity: Activity as tolerated, If you experience any of the following symptoms  Fever, chills, shortness of breath, please follow up with md. 
 
*  Please give a list of your current medications to your Primary Care Provider. *  Please update this list whenever your medications are discontinued, doses are 
    changed, or new medications (including over-the-counter products) are added. *  Please carry medication information at all times in case of emergency situations. These are general instructions for a healthy lifestyle: No smoking/ No tobacco products/ Avoid exposure to second hand smoke Surgeon General's Warning:  Quitting smoking now greatly reduces serious risk to your health. Obesity, smoking, and sedentary lifestyle greatly increases your risk for illness A healthy diet, regular physical exercise & weight monitoring are important for maintaining a healthy lifestyle You may be retaining fluid if you have a history of heart failure or if you experience any of the following symptoms:  Weight gain of 3 pounds or more overnight or 5 pounds in a week, increased swelling in our hands or feet or shortness of breath while lying flat in bed. Please call your doctor as soon as you notice any of these symptoms; do not wait until your next office visit. Recognize signs and symptoms of STROKE: 
 
F-face looks uneven A-arms unable to move or move unevenly S-speech slurred or non-existent T-time-call 911 as soon as signs and symptoms begin-DO NOT go Back to bed or wait to see if you get better-TIME IS BRAIN. Warning Signs of HEART ATTACK Call 911 if you have these symptoms: 
? Chest discomfort.  Most heart attacks involve discomfort in the center of the chest that lasts more than a few minutes, or that goes away and comes back. It can feel like uncomfortable pressure, squeezing, fullness, or pain. ? Discomfort in other areas of the upper body. Symptoms can include pain or discomfort in one or both arms, the back, neck, jaw, or stomach. ? Shortness of breath with or without chest discomfort. ? Other signs may include breaking out in a cold sweat, nausea, or lightheadedness. Don't wait more than five minutes to call [address]! Fast action can save your life. Calling 911 is almost always the fastest way to get lifesaving treatment. Emergency Medical Services staff can begin treatment when they arrive  up to an hour sooner than if someone gets to the hospital by car. The discharge information has been reviewed with the patient. The patient verbalized understanding. Discharge medications reviewed with the patient and appropriate educational materials and side effects teaching were provided. ___________________________________________________________________________________________________________________________________ joizhart Announcement We are excited to announce that we are making your provider's discharge notes available to you in HealthSouk. You will see these notes when they are completed and signed by the physician that discharged you from your recent hospital stay. If you have any questions or concerns about any information you see in youbeQ - Maps With Lifet, please call the Health Information Department where you were seen or reach out to your Primary Care Provider for more information about your plan of care. Introducing [facility]! Dear Torin Aggarwal: Thank you for requesting a HealthSouk account. Our records indicate that you already have an active HealthSouk account. You can access your account anytime at [email or web address] Did you know that you can access your hospital and ER discharge instructions at any time in Dokogeohart? You can also review all of your test results from your hospital stay or ER visit. Additional Information If you have questions, please visit the Frequently Asked Questions section of the Cent[employer]et website at [email or web address] Remember, Centicet is NOT to be used for urgent needs. For medical emergencies, dial 911. Now available from your iPhone and Android! Introducing Zafar Bradford As a [facility] patient, I wanted to make you aware of our electronic visit tool called Zafar Bradford. [facility]/7 allows you to connect within minutes with a medical provider 24 hours a day, seven days a week via a mobile device or tablet or logging into a secure website from your computer. You can access Zafar Bradford from anywhere in the [place]. A virtual visit might be right for you when you have a simple condition and feel like you just dont want to get out of bed, or cant get away from work for an appointment, when your regular [facility] provider is not available (evenings, weekends or holidays), or when youre out of town and need minor care. Electronic visits cost only $49 and if the [facility]/Clover provider determines a prescription is needed to treat your condition, one can be electronically transmitted to a nearby pharmacy*. Please take a moment to enroll today if you have not already done so. The enrollment process is free and takes just a few minutes. To enroll, please download the WebKite 24/Clover viviana to your tablet or phone, or visit [email or web address] to enroll on your computer. And, as an [address] [place] [address] patient with a [facility] account, the results of your visits will be scanned into your electronic medical record and your primary care provider will be able to view the scanned results.    
We urge you to continue to see your regular [facility] provider for your ongoing medical care. And while your primary care provider may not be the one available when you seek a Zafar Bradenalejandrofin virtual visit, the peace of mind you get from getting a real diagnosis real time can be priceless. For more information on Zafar Bradenalejandrofin, view our Frequently Asked Questions (FAQs) at [email or web address]. Sincerely, 
 
Telly Morales MD 
Chief Medical Officer Li Chen *:  certain medications cannot be prescribed via Zafar Bradenfortino Providers Seen During Your Hospitalization Provider Specialty Primary office phone Gamaliel Wareul, [address] [place] [address] Orthopedic Surgery [phone] Your Primary Care Physician (PCP) Primary Care Physician Office Phone Office Fax Beverley Baez [phone] You are allergic to the following Allergen Reactions Ancef (Cefazolin) Itching Hives Cefazolin Hives Recent Documentation Height Weight BMI OB Status Smoking Status 1.676 m 86.4 kg 30.73 kg/m2 Hysterectomy Former Smoker Emergency Contacts Name Discharge Info Relation Home Work Mobile Fei Dad CAREGIVER [3] Daughter [21] [phone] Art Londono DISCHARGE CAREGIVER [3] Spouse [3] [phone] Patient Belongings The following personal items are in your possession at time of discharge: 
  Dental Appliances: None  Visual Aid: Glasses      Home Medications: None   Jewelry: None  Clothing: Undergarments, Socks, Footwear, Shirt, Pants    Other Valuables: None Please provide this summary of care documentation to your next provider. Signatures-by signing, you are acknowledging that this After Visit Summary has been reviewed with you and you have received a copy. Patient Signature:  ____________________________________________________________  Date:  ____________________________________________________________  
  
Giovanni Abimaelist    
    
 Provider Signature:  ____________________________________________________________ Date:  ____________________________________________________________

## 2018-08-14 NOTE — ANESTHESIA PROCEDURE NOTES
Peripheral Block    Start time: 8/14/2018 9:47 AM  End time: 8/14/2018 10:05 AM  Performed by: Tylor Sarkar by: April Calix       Pre-procedure: Indications: at surgeon's request, post-op pain management and procedure for pain    Preanesthetic Checklist: patient identified, risks and benefits discussed, site marked, timeout performed, anesthesia consent given and patient being monitored      Block Type:   Block Type:  Lumbar plexus  Laterality:  Right  Monitoring:  Standard ASA monitoring, heart rate, continuous pulse ox, frequent vital sign checks, oxygen and responsive to questions  Injection Technique:  Single shot  Procedures: nerve stimulator    Prep: chlorhexidine    Location:  Sacral area  Needle Type:  Stimuplex  Needle Gauge:  21 G  Needle Localization:  Anatomical landmarks and nerve stimulator  Medication Injected:  0.2%  ropivacaine  Volume (mL):  25    Assessment:  Number of attempts:  2  Injection Assessment:  Incremental injection every 5 mL, negative aspiration for blood, no intravascular symptoms, no paresthesia and negative aspiration for CSF  Patient tolerance:  Patient tolerated the procedure well with no immediate complications  Location:  PREOP HOLDING    Patient given 2 mg IV Versed and 200 mcg IV Fentanyl for sedation. Procedure done with loss of resistance.     8/14/2018     10:07 AM     Neftaly Spence MD

## 2018-08-14 NOTE — ANESTHESIA PREPROCEDURE EVALUATION
Anesthetic History   No history of anesthetic complications            Review of Systems / Medical History  Patient summary reviewed and pertinent labs reviewed    Pulmonary  Within defined limits                 Neuro/Psych   Within defined limits           Cardiovascular  Within defined limits  Hypertension              Exercise tolerance: >4 METS     GI/Hepatic/Renal  Within defined limits              Endo/Other  Within defined limits      Obesity and arthritis     Other Findings   Comments: Documentation of current medication  Current medications obtained, documented and obtained? YES      Risk Factors for Postoperative nausea/vomiting:       History of postoperative nausea/vomiting? NO       Female? YES       Motion sickness? NO       Intended opioid administration for postoperative analgesia? YES      Smoking Abstinence:  Current Smoker? NO  Elective Surgery? YES  Seen preoperatively by anesthesiologist or proxy prior to day of surgery? YES  Pt abstained from smoking 24 hours prior to anesthesia? N/A    Preventive care/screening for High Blood Pressure:  Aged 18 years and older: YES  Screened for high blood pressure: YES  Patients with high blood pressure referred to primary care provider   for BP management: YES           Physical Exam    Airway  Mallampati: II  TM Distance: 4 - 6 cm  Neck ROM: normal range of motion   Mouth opening: Normal     Cardiovascular  Regular rate and rhythm,  S1 and S2 normal,  no murmur, click, rub, or gallop  Rhythm: regular  Rate: normal         Dental    Dentition: Poor dentition  Comments: The patient has very poor dentition. Loose, broken, and or missing teeth. I explained the risk of dental damage and or loss. The patient understands and accepts these risks.      Pulmonary  Breath sounds clear to auscultation               Abdominal  GI exam deferred       Other Findings            Anesthetic Plan    ASA: 3  Anesthesia type: general and regional - lumbar plexus block          Induction: Intravenous  Anesthetic plan and risks discussed with: Patient

## 2018-08-14 NOTE — PERIOP NOTES
Per Anesthsia MD, its okay to put Peripheral I.V. site in the right arm as the patient demanded. MD made aware of patients b/p upon arrival, no new orders given.

## 2018-08-14 NOTE — H&P (VIEW-ONLY)
[address] [facility], [address] [facility]  [phone]           HISTORY & PHYSICAL      Patient: Doreen Sy                MRN: [id number]       SSN: [id number]  YOB: 1950        AGE: 79 y.o. SEX: female  Body mass index is 31.02 kg/(m^2). PCP: Marlon Cantu MD  08/08/18      CC: right hip end stage OA  Problem List Items Addressed This Visit     None            HPI:  The patient is a pleasant 79 y.o. whom has end stage OA of their Right hip and has failed conservative treatment including but not limited to NSAIDS, cortisone injections, viscosupplementation, PT, and pain medicine. Due to the current findings and affected activities of daily living, surgical intervention is indicated. The alternatives, risks, complications, as well as expected outcome were discussed. These include but are not limited to infection, blood loss, need for blood transfusion, neurovascular damage, DVT, PE,  post-op stiffness and pain, leg length discrepancy, dislocation, anesthetic complications, prothesis longevity, need for more surgery, MI, stroke, and even death. The patient understands and wishes to proceed with surgery. 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)     RLQ - leakage from ileostomy    BMI 31.0-31.9,adult     31.8    Chronic pain     right leg pain; spine and hip down to ankle    Decreased exercise tolerance     mets <4 (SOB)    Depression     Diverticulitis     Fistula of enterostomy (HCC)     Hypertension     Incisional hernia     Sciatic pain     SOB (shortness of breath)          Current Outpatient Prescriptions:     lisinopril (PRINIVIL, ZESTRIL) 20 mg tablet, Take  by mouth daily. , Disp: , Rfl:     acetaminophen (TYLENOL) 650 mg TbER, Take 650 mg by mouth every eight (8) hours.  Indications: Arthritic Pain, Disp: , Rfl:     potassium chloride (KLOR-CON M20) 20 mEq tablet, Take  by mouth two (2) times a day., Disp: , Rfl:     furosemide (LASIX) 20 mg tablet, Take  by mouth two (2) times a day. Indications: Edema, Disp: , Rfl:     magnesium gluconate 500 mg (27 mg  elemental) tablet, Take  by mouth two (2) times a day., Disp: , Rfl:     cyanocobalamin (VITAMIN B-12) 1,000 mcg tablet, Take 1,000 mcg by mouth daily. , Disp: , Rfl:     MULTIVITAMIN WITH IRON (CHILDREN'S VITAMIN WITH IRON PO), Take 1 Tab by mouth daily. , Disp: , Rfl:     vitamin E (AQUA GEMS) 400 unit capsule, Take 400 Units by mouth., Disp: , Rfl:     HYDROcodone-acetaminophen (NORCO) 5-325 mg per tablet, Take 1-2 Tabs by mouth every six (6) hours as needed for Pain. Max Daily Amount: 8 Tabs., Disp: 35 Tab, Rfl: 0    oxyCODONE-acetaminophen (PERCOCET) 5-325 mg per tablet, Take 1-2 Tabs by mouth every four (4) hours as needed for Pain. Max Daily Amount: 12 Tabs., Disp: 40 Tab, Rfl: 0    Allergies   Allergen Reactions    Ancef [Cefazolin] Itching and Hives    Cefazolin Hives       Social History     Social History    Marital status:      Spouse name: N/A    Number of children: N/A    Years of education: N/A     Occupational History    Not on file.      Social History Main Topics    Smoking status: Former Smoker     Quit date: 1/1/2017    Smokeless tobacco: Never Used    Alcohol use No    Drug use: No    Sexual activity: Not on file     Other Topics Concern    Not on file     Social History Narrative    ** Merged History Encounter **            Past Surgical History:   Procedure Laterality Date    FLEXIBLE SIGMOIDOSCOPY N/A 4/26/2017    FLEXIBLE SIGMOIDOSCOPY  performed by Candis Koch MD at [address] [facility]  04/2017    reversal of ileostomy    HX HEENT      tonsillectomy    HX HERNIA REPAIR      HX KNEE REPLACEMENT N/A     right elbow repair    HX KNEE REPLACEMENT Right     HX SMALL BOWEL RESECTION      ileostomy formation    HX QUENTIN AND BSO      HX TONSILLECTOMY      HX TUBAL LIGATION N/A     reopened tubes    HX TUBAL LIGATION      HX UROLOGICAL  03/01/2017    s/p Placement of bilateral double-J stents - 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 PE:  Visit Vitals    /59    Pulse (!) 58    Temp 98 °F (36.7 °C) (Oral)    Resp 16    Ht 5' 6\" (1.676 m)    Wt 192 lb 3.2 oz (87.2 kg)    SpO2 99%    BMI 31.02 kg/m2     A&O X3, NAD, well develop, well nourished  Heart: S1-S2, rrr  Lungs: CTA bilat  Abd: soft, nt, nt, + bs in all quadrants  Ext:  Pos distal pulses to DP, PT  Leg lengths show the right LE to be slightly longer grossly sitting in the chair. This was explained to the patient    X-ray: right hip shows end stage OA    Labs: labs were reviewed and wnl.  ua pending    A:  Right  hip end stage OA    P:  At this point we will move forward with surgery. Again, the alternatives, risks, complications, as well as expected outcome were discussed and the patient wishes to proceed with surgery. Pt has been instructed to stop aspirin, nsaids, rheumatologic medications and blood thinners. They have also been instructed to continue on any heart and bp meds and to take them the morning of surgery with sips of water. Lateral approach. The patient will require in patient admission due to above stated medical conditions as well the the surgical challenges given the anatomy and bone quality.          Jorge Pretty Done

## 2018-08-15 VITALS
TEMPERATURE: 97.5 F | BODY MASS INDEX: 30.59 KG/M2 | HEART RATE: 67 BPM | RESPIRATION RATE: 18 BRPM | OXYGEN SATURATION: 100 % | SYSTOLIC BLOOD PRESSURE: 128 MMHG | WEIGHT: 190.38 LBS | HEIGHT: 66 IN | DIASTOLIC BLOOD PRESSURE: 72 MMHG

## 2018-08-15 LAB
ANION GAP SERPL CALC-SCNC: 7 MMOL/L (ref 3–18)
BUN SERPL-MCNC: 21 MG/DL (ref 7–18)
BUN/CREAT SERPL: 21 (ref 12–20)
CALCIUM SERPL-MCNC: 8.2 MG/DL (ref 8.5–10.1)
CHLORIDE SERPL-SCNC: 109 MMOL/L (ref 100–108)
CO2 SERPL-SCNC: 25 MMOL/L (ref 21–32)
CREAT SERPL-MCNC: 1.01 MG/DL (ref 0.6–1.3)
GLUCOSE SERPL-MCNC: 111 MG/DL (ref 74–99)
POTASSIUM SERPL-SCNC: 4.1 MMOL/L (ref 3.5–5.5)
SODIUM SERPL-SCNC: 141 MMOL/L (ref 136–145)

## 2018-08-15 PROCEDURE — 97165 OT EVAL LOW COMPLEX 30 MIN: CPT

## 2018-08-15 PROCEDURE — 80048 BASIC METABOLIC PNL TOTAL CA: CPT | Performed by: ORTHOPAEDIC SURGERY

## 2018-08-15 PROCEDURE — 74011250637 HC RX REV CODE- 250/637: Performed by: ORTHOPAEDIC SURGERY

## 2018-08-15 PROCEDURE — 74011000258 HC RX REV CODE- 258: Performed by: ORTHOPAEDIC SURGERY

## 2018-08-15 PROCEDURE — 97535 SELF CARE MNGMENT TRAINING: CPT

## 2018-08-15 PROCEDURE — 74011000250 HC RX REV CODE- 250: Performed by: ORTHOPAEDIC SURGERY

## 2018-08-15 PROCEDURE — 36415 COLL VENOUS BLD VENIPUNCTURE: CPT | Performed by: ORTHOPAEDIC SURGERY

## 2018-08-15 PROCEDURE — 97116 GAIT TRAINING THERAPY: CPT

## 2018-08-15 RX ORDER — OXYCODONE HYDROCHLORIDE 15 MG/1
15 TABLET ORAL
Qty: 60 TAB | Refills: 0 | Status: SHIPPED | OUTPATIENT
Start: 2018-08-15 | End: 2019-04-17

## 2018-08-15 RX ORDER — CELECOXIB 200 MG/1
200 CAPSULE ORAL 2 TIMES DAILY
Qty: 60 CAP | Refills: 2 | Status: SHIPPED | OUTPATIENT
Start: 2018-08-15 | End: 2018-09-20 | Stop reason: ALTCHOICE

## 2018-08-15 RX ORDER — OXYCODONE AND ACETAMINOPHEN 10; 325 MG/1; MG/1
1-2 TABLET ORAL
Qty: 60 TAB | Refills: 0 | Status: SHIPPED | OUTPATIENT
Start: 2018-08-15 | End: 2018-08-15

## 2018-08-15 RX ORDER — ASPIRIN 325 MG/1
325 TABLET, FILM COATED ORAL 2 TIMES DAILY
Qty: 60 TAB | Refills: 1 | Status: SHIPPED | OUTPATIENT
Start: 2018-08-15 | End: 2018-09-20 | Stop reason: ALTCHOICE

## 2018-08-15 RX ORDER — FERROUS SULFATE 325(65) MG
325 TABLET, DELAYED RELEASE (ENTERIC COATED) ORAL DAILY
Qty: 30 TAB | Refills: 1 | Status: SHIPPED | OUTPATIENT
Start: 2018-08-15 | End: 2019-04-17

## 2018-08-15 RX ADMIN — ACETAMINOPHEN 1000 MG: 500 TABLET, FILM COATED ORAL at 06:29

## 2018-08-15 RX ADMIN — ACETAMINOPHEN 1000 MG: 500 TABLET, FILM COATED ORAL at 00:10

## 2018-08-15 RX ADMIN — FUROSEMIDE 20 MG: 20 TABLET ORAL at 08:54

## 2018-08-15 RX ADMIN — OXYCODONE HYDROCHLORIDE 15 MG: 15 TABLET ORAL at 05:08

## 2018-08-15 RX ADMIN — OXYCODONE HYDROCHLORIDE 15 MG: 15 TABLET ORAL at 08:54

## 2018-08-15 RX ADMIN — OXYCODONE HYDROCHLORIDE 15 MG: 15 TABLET ORAL at 13:21

## 2018-08-15 RX ADMIN — PREGABALIN 50 MG: 50 CAPSULE ORAL at 08:54

## 2018-08-15 RX ADMIN — ASPIRIN 325 MG: 325 TABLET, FILM COATED ORAL at 08:53

## 2018-08-15 RX ADMIN — ACETAMINOPHEN 1000 MG: 500 TABLET, FILM COATED ORAL at 13:21

## 2018-08-15 RX ADMIN — POTASSIUM CHLORIDE 20 MEQ: 20 TABLET, EXTENDED RELEASE ORAL at 08:53

## 2018-08-15 RX ADMIN — CELECOXIB 200 MG: 100 CAPSULE ORAL at 08:53

## 2018-08-15 RX ADMIN — CLINDAMYCIN PHOSPHATE 900 MG: 150 INJECTION, SOLUTION INTRAVENOUS at 07:12

## 2018-08-15 RX ADMIN — MAGNESIUM HYDROXIDE 30 ML: 400 SUSPENSION ORAL at 08:53

## 2018-08-15 RX ADMIN — LISINOPRIL 20 MG: 20 TABLET ORAL at 08:54

## 2018-08-15 RX ADMIN — Medication 10 ML: at 06:29

## 2018-08-15 NOTE — PROGRESS NOTES
Problem: Mobility Impaired (Adult and Pediatric)  Goal: *Acute Goals and Plan of Care (Insert Text)  Physical Therapy Goals  Initiated 8/14/2018 and to be accomplished within 7 day(s)  1. Patient will move from supine to sit and sit to supine , scoot up and down and roll side to side in bed with supervision/set-up. 2.  Patient will transfer from bed to chair and chair to bed with supervision/set-up using the least restrictive device. 3.  Patient will perform sit to stand with supervision/set-up. 4.  Patient will ambulate with supervision/set-up for >150 feet with the least restrictive device. 5.  Patient will ascend/descend 3 stairs with 0 handrail(s) with minimal assistance/contact guard assist and LRAD as well as 10 stairs with 1 handrail min/CGA LRAD. Outcome: Progressing Towards Goal  physical Therapy TREATMENT    Patient: Abhijeet Garrido (14 y.o. female)  Date: 8/15/2018  Diagnosis: Osteoarthritis of right hip, unspecified osteoarthritis type [M16.11] <principal problem not specified>  Procedure(s) (LRB):  RIGHT TOTAL HIP ARTHROPLASTY LATERAL APPROACH (Right) 1 Day Post-Op  Precautions: WBAT, Total hip   Chart, physical therapy assessment, plan of care and goals were reviewed. OBJECTIVE/ ASSESSMENT:  Patient found sitting in b/s chair willing to work with PT. Pt ambulated into hallway this tx with RW and very slow sierra. Pt requires multiple standing rest breaks, mostly due to dry mouth and need to sip water which was brought along during ambulation. Pt's balance is good with RW and pt also maintains balance when taking bilateral UEs off of RW. Pt negotiated a total of 8 stairs this tx. 4 with bilateral hand rails and 4 with 1 handrail and SPC in contralateral UE. Pt is very anxious on stairs, but able to complete with SBA. Pt returned to room to sitting in b/s chair. Pt left with needs in reach and lunch tray in front of her. Reviewed safety at home and HEP at length.  Pt would like to work with PT one more time prior to DC for peace of mind with stair negotiation. Education: tranfers, gait, therex. Progression toward goals:  [x]      Improving appropriately and progressing toward goals  []      Improving slowly and progressing toward goals  []      Not making progress toward goals and plan of care will be adjusted     PLAN:  Patient continues to benefit from skilled intervention to address the above impairments. Continue treatment per established plan of care. Discharge Recommendations:  Home Health  Further Equipment Recommendations for Discharge:  rolling walker     SUBJECTIVE:   Patient stated That one back there(daughter), yelled at me when I didn't use the walker at home.     OBJECTIVE DATA SUMMARY:   Critical Behavior:  Neurologic State: Alert  Orientation Level: Oriented X4  Cognition: Appropriate decision making, Follows commands, Appropriate safety awareness  Safety/Judgement: Home safety, Fall prevention  Functional Mobility Training:  Transfers:  Sit to Stand: Supervision  Stand to Sit: Supervision  Balance:  Sitting: Intact  Standing: Intact  Ambulation/Gait Training:  Distance (ft): 345 Feet (ft)  Assistive Device: Walker, rolling  Ambulation - Level of Assistance: Supervision  Gait Abnormalities: Antalgic;Decreased step clearance  Right Side Weight Bearing: As tolerated  Base of Support: Shift to left  Speed/Temi: Slow;Pace decreased (<100 feet/min)  Step Length: Left shortened;Right shortened    Pain:  Pre tx pain: 5  Post tx pain: 5  Activity Tolerance:   Fair  Please refer to the flowsheet for vital signs taken during this treatment.   After treatment:   [x] Patient left in no apparent distress sitting up in chair  [] Patient left in no apparent distress in bed  [x] Call bell left within reach  [] Nursing notified  [] Caregiver present  [] Bed alarm activated  [] SCDs applied  [] Ice applied      Aurora Cervantes PTA   Time Calculation: 56 mins    Mobility  Current CI= 1-19%. The severity rating is based on the Level of Assistance required for Functional Mobility and ADLs. Mobility   Goal  CI= 1-19%. The severity rating is based on the Level of Assistance required for Functional Mobility and ADLs.

## 2018-08-15 NOTE — PROGRESS NOTES
Problem: Mobility Impaired (Adult and Pediatric)  Goal: *Acute Goals and Plan of Care (Insert Text)  Physical Therapy Goals  Initiated 8/14/2018 and to be accomplished within 7 day(s)  1. Patient will move from supine to sit and sit to supine , scoot up and down and roll side to side in bed with supervision/set-up. 2.  Patient will transfer from bed to chair and chair to bed with supervision/set-up using the least restrictive device. 3.  Patient will perform sit to stand with supervision/set-up. 4.  Patient will ambulate with supervision/set-up for >150 feet with the least restrictive device. 5.  Patient will ascend/descend 3 stairs with 0 handrail(s) with minimal assistance/contact guard assist and LRAD as well as 10 stairs with 1 handrail min/CGA LRAD.       1108 - Pt working with OT at this time.  Will f/u later in AM.

## 2018-08-15 NOTE — DISCHARGE SUMMARY
8/14/2018  8:19 AM    8/15/2018, 8:19 AM    Primary Dx:right Orthopedic / Rheumatologic: Total Hip Replacement  Secondary Dx: Etiological Diagnoses: none    HPI:  Pt has end stage OA and had failed conservative treatment. Due to the current findings and affected activity of daily living surgical intervention is indicated.   The alternatives, risks, complications as well as expected outcome were discussed, the patient understands and wishes to proceed with surgery    Past Medical History:   Diagnosis Date    Altered bowel elimination due to intestinal ostomy ([facility]     RLQ - leakage from ileostomy    BMI 31.0-31.9,adult     31.8    Chronic pain     right leg pain; spine and hip down to ankle    Decreased exercise tolerance     mets <4 (SOB)    Depression     Diverticulitis     Fistula of enterostomy ([facility]     Hypertension     Incisional hernia     Sciatic pain     SOB (shortness of breath)          Current Facility-Administered Medications:     WARFARIN INFORMATION NOTE (COUMADIN), , Other, Q24H, Gamaliel Aceves MD    furosemide (LASIX) tablet 20 mg, 20 mg, Oral, BID, Gamaliel Aceves MD    lisinopril (PRINIVIL, ZESTRIL) tablet 20 mg, 20 mg, Oral, DAILY, Gamaliel Aceves MD    magnesium gluconate tablet 27 mg [elemental], 27 mg, Oral, BID, Gamaliel Aceves MD, 27 mg at 08/14/18 1851    potassium chloride (K-DUR, KLOR-CON) SR tablet 20 mEq, 20 mEq, Oral, BID, Gamaliel Aceves MD, 20 mEq at 08/14/18 1844    dextrose 5 % - 0.45% NaCl infusion, 100 mL/hr, IntraVENous, CONTINUOUS, Gamaliel Aceves MD, Last Rate: 100 mL/hr at 08/14/18 1530, 100 mL/hr at 08/14/18 1530    sodium chloride (NS) flush 5-10 mL, 5-10 mL, IntraVENous, Q8H, Gamaliel Aceves MD, 10 mL at 08/15/18 0629    sodium chloride (NS) flush 5-10 mL, 5-10 mL, IntraVENous, PRN, Gamaliel Aceves MD    naloxone [facility]) injection 0.4 mg, 0.4 mg, IntraVENous, PRN, Gamaliel Aceves MD    diphenhydrAMINE (BENADRYL) capsule 25 mg, 25 mg, Oral, Q6H PRN, Chitra Khalil MD    zolpidem [facility]) tablet 5 mg, 5 mg, Oral, QHS PRN, Chitra Khalil MD    acetaminophen (TYLENOL) tablet 1,000 mg, 1,000 mg, Oral, Q6H, Chitra Khalil MD, 1,000 mg at 08/15/18 0629    oxyCODONE IR (OXY-IR) immediate release tablet 15 mg, 15 mg, Oral, Q3H PRN, Chitra Khalil MD, 15 mg at 08/15/18 0508    ondansetron (ZOFRAN) injection 4 mg, 4 mg, IntraVENous, Q4H PRN, Chitra Khalil MD    magnesium hydroxide (MILK OF MAGNESIA) 400 mg/5 mL oral suspension 30 mL, 30 mL, Oral, DAILY, Chitra Khalil MD    celecoxib (CELEBREX) capsule 200 mg, 200 mg, Oral, BID, Chitra Khalil MD, 200 mg at 08/14/18 1844    pregabalin (LYRICA) capsule 50 mg, 50 mg, Oral, BID, Chitra Khalil MD, 50 mg at 08/14/18 1530    buffered aspirin (BUFFERIN) tablet 325 mg, 325 mg, Oral, BID, Chitra Khalil MD      Ancef [cefazolin] and Cefazolin    Physical Exam:  General A&O x3 NAD, well developed, well nourished, normal affect  Heart: S1-S2, RRR  Lungs: CTA Bilat  Abd: soft NT, ND  Ext: n/v intact    Hospital Course:    Pt. Had rightOrthopedic / Rheumatologic: Total Hip Replacement    Post -op Course: The patient tolerated the procedure well. They were followed by internal medicine for help with medical management. Pt. Was place on Abx pre and post-op for prophylaxis against infection as well as coumadin pre and post-op for prophylaxis against DVT. Vitals signs remained stable, remained af. The wound wasclean, dry, no drainage. Pain was well controlled. Pt. Had negative calf tenderness or swelling, no evidence for DVT. Patient had PT/OT consult for evaluation and treatment.     CBC  Lab Results   Component Value Date/Time    WBC 6.2 08/02/2018 09:28 AM    RBC 4.11 (L) 08/02/2018 09:28 AM    HCT 38.2 08/02/2018 09:28 AM    MCV 92.9 08/02/2018 09:28 AM    MCH 30.2 08/02/2018 09:28 AM    MCHC 32.5 08/02/2018 09:28 AM    RDW 14.4 08/02/2018 09:28 AM     Coagulation  Lab Results   Component Value Date    INR 1.0 08/02/2018    APTT 32.0 08/02/2018      Basic Metabolic Profile  Lab Results   Component Value Date     08/15/2018    CO2 25 08/15/2018    BUN 21 (H) 08/15/2018       Discharge Plan:  The patient will be d/c'd to home, total hip protocol, WBAT. [facility] and [facility]. Total joint protocol. Pt safe for homebound transfer, sp Total joint replacement. A walker, bedside commode, and shower chair will be utilized for ADL's. Follow up with Dr. Chuck Card in 10-12 days. Call with any questions or concerns.

## 2018-08-15 NOTE — PROGRESS NOTES
Problem: Mobility Impaired (Adult and Pediatric)  Goal: *Acute Goals and Plan of Care (Insert Text)  Physical Therapy Goals  Initiated 8/14/2018 and to be accomplished within 7 day(s)  1. Patient will move from supine to sit and sit to supine , scoot up and down and roll side to side in bed with supervision/set-up. 2.  Patient will transfer from bed to chair and chair to bed with supervision/set-up using the least restrictive device. 3.  Patient will perform sit to stand with supervision/set-up. 4.  Patient will ambulate with supervision/set-up for >150 feet with the least restrictive device. 5.  Patient will ascend/descend 3 stairs with 0 handrail(s) with minimal assistance/contact guard assist and LRAD as well as 10 stairs with 1 handrail min/CGA LRAD. Outcome: Progressing Towards Goal  physical Therapy TREATMENT    Patient: Pantera Lang (89 y.o. female)  Date: 8/15/2018  Diagnosis: Osteoarthritis of right hip, unspecified osteoarthritis type [M16.11] <principal problem not specified>  Procedure(s) (LRB):  RIGHT TOTAL HIP ARTHROPLASTY LATERAL APPROACH (Right) 1 Day Post-Op  Precautions: WBAT, Total hip   Chart, physical therapy assessment, plan of care and goals were reviewed. OBJECTIVE/ ASSESSMENT:  Patient found in bathroom willing to work with PT. Pt ambulated into hallway this tx and again negotiated stairs. This tx, pt negotiated 6 total stairs with 1 HR and SPC in contralateral UE. Pt negotiated stairs with assistance from daughter this tx, both parties adhering to correct positions. Pt returned to b/s chair where she was left with needs in reach. Education: transfers, gait, therex.   Progression toward goals:  [x]      Improving appropriately and progressing toward goals  []      Improving slowly and progressing toward goals  []      Not making progress toward goals and plan of care will be adjusted     PLAN:  Patient continues to benefit from skilled intervention to address the above impairments. Continue treatment per established plan of care. Discharge Recommendations:  Home Health  Further Equipment Recommendations for Discharge:  rolling walker     SUBJECTIVE:   Patient stated The dog would jump up all over me, so I had to clock his across the eyes.     OBJECTIVE DATA SUMMARY:   Critical Behavior:  Neurologic State: Alert  Orientation Level: Oriented X4  Cognition: Appropriate decision making, Follows commands, Appropriate safety awareness  Safety/Judgement: Home safety, Fall prevention  Functional Mobility Training:  Transfers:  Sit to Stand: Supervision  Stand to Sit: Supervision  Balance:  Sitting: Intact  Standing: Intact; Without support  Ambulation/Gait Training:  Distance (ft): 200 Feet (ft)  Assistive Device: Walker, rolling  Ambulation - Level of Assistance: Supervision  Gait Abnormalities: Antalgic;Decreased step clearance  Right Side Weight Bearing: As tolerated  Base of Support: Shift to left  Speed/Temi: Slow  Step Length: Left shortened;Right shortened    Therapeutic Exercises:     Pain:  Pre tx pain: not rated  Post tx pain: not rated  Activity Tolerance:   Fair  Please refer to the flowsheet for vital signs taken during this treatment. After treatment:   [x] Patient left in no apparent distress sitting up in chair  [] Patient left in no apparent distress in bed  [x] Call bell left within reach  [] Nursing notified  [x] Caregiver present  [] Bed alarm activated  [] SCDs applied  [] Ice applied      Juancarlos Burch PTA   Time Calculation: 28 mins    Mobility [id number] Current  CI= 1-19%. The severity rating is based on the Level of Assistance required for Functional Mobility and ADLs. Mobility   Goal  CI= 1-19%. The severity rating is based on the Level of Assistance required for Functional Mobility and ADLs.

## 2018-08-15 NOTE — PROGRESS NOTES
Rounded on patient post total hip replacement. Activity: Reinforced importance of getting OOB for all meals, going to bathroom to help prevent blood clots. VTE prophylaxis: Instructed patient to use their SCD's when not up and walking. To use while in bed and in the chair. Educated re: ankle pumps to assist with circulation when in hospital and at home. Medications: Reviewed pain medications patient is taking and the importance of keeping pain under control to help with getting OOB and therapy. Reminded the patient to always eat a snack with their pain medication to help to prevent nausea. Encouraged patient to monitor for constipation and to take a stool softner/laxative while recovering and on pain medication. Discussed protein consumption to promote healing  Constipation: Educated patient to take a stool softener and/or mild laxative to prevent constipation while on a narcotic. Patient educated that narcotics and decrease mobility can increase constipation so patient educated to continue to take stool softener and or laxative while on narcotics along with drinking 8 glasses of water a day (unless on fluid restriction). Incentive Spirometry: Reinforced use of incentive spirometer with return demonstration by patient. Wound Care: Dressing to surgical site dry and intact aquacel. Patient instructed not to take dressing off at home. Patient given CHG wash to use in hospital and at home. Stressed importance of using a clean towel and washcloth daily. Reminded to put on clean clothes and night clothes daily. Ice Protocol: Ice in place per protocol. Patient Safety: Call light and personal belongings in reach. in reach. Patient  reminded to call for help toget OOB or when leaving bathroom for safety. Phone and other items also within reach per patient's request.     Diet: Educated patient on the importance of eating three well balanced meals a day with protein to promote bone/muscle healing.  Reminded patient to drink lots of fluids to protect kidneys from all the medications being taken currently with recovery. Patient given educational material to remind them to continue doing everything at home to prevent complications and have a successful recovery. Patient  verbalized understand of all information and education discussed. Patient  given the opportunity for asking questions. Mobility Intervention:       [] Pt dangled at edge of bed    [] Pt assisted OOB to bedside commode    [x] Pt assisted OOB to chair    [] Pt ambulated to bathroom    [] Patient was ambulated in room/hallway    Assistive Device Utilized:       [x] Rolling walker   [] Crutches   [] Straight Cane   [] Knee immobilizer   [] IV pole    After Mobilization:     [x] Patient left in no apparent distress sitting up in chair  [] Patient left in no apparent distress in bed  [x] Call bell left within reach  [x] SCDs on & machine turned on  [x] Ice applied  [] RN notified  [] Caregiver present  [] Bed alarm activated    Reason patient not mobilized:      [] Patient refused   [] Nausea/vomiting   [] Low blood pressure   [] Drowsy/lethargic    Pain Rating:     Left patient with call light, cell phone and personal belongings in reach for safety.

## 2018-08-15 NOTE — OP NOTES
[facility]  OPERATIVE REPORT    Name:YORDY Dobbs  MR#: [id number]  : 1950  ACCOUNT #: [de-identified]   DATE OF SERVICE: 2018    PREOPERATIVE DIAGNOSIS:  End-stage arthritis of the right hip with significant superior migration of the head, and also, the leg length is actually starting a millimeter or 2 longer on the affected extremity; and end-stage arthritis, right hip. Severe osteoporosis. POSTOPERATIVE DIAGNOSIS:  End-stage arthritis of the right hip with significant superior migration of the head, and also the leg length is actually starting a millimeter or 2 longer on the affected extremity; and end-stage arthritis, right hip. Severe osteoporosis. PROCEDURES PERFORMED:  Right total hip replacement using the [place] Accolade II  system with a size 5 high-offset 127 femoral component, a 52 Tritanium Trident II cup, a 36 neutral lateralized liner, and a 36 -5 ceramic femoral head. SURGEON:  Stewart Arnold MD    COMPLICATIONS:  No complications. SPECIMENS REMOVED:  Femoral head. ESTIMATED BLOOD LOSS:  300 mL    ASSISTANT:  Camilo Briggs 1st assistant, Casa Velez 2nd assistant. Camilo Briggs was the 1st assistant; assisted with all phases of surgery, including but not limited to patient positioning, patient prep, patient drape, positioning of the leg during surgery, retractoring assistance with the surgery, closure, and dressing placement, and transfer. ANESTHESIA:  Dr. Ambrosio Avila. Preoperative lumbar plexus block, light general.    IMPLANTS:  As above    CLINICAL NOTE:  We identified in the office the patient's right lower extremity surprisingly is longer than the left, and this was documented. Interestingly enough, the AP pelvis shows that her right hip is short. We explained to the patient in the office, and also here at the hospital, that the leg length would not be made shorter, and that a little bit of lengthening is rest assured.   Having said this, we would try to not lengthen the leg at all, and we would use an offset and leg length guide, and also with a lateralized liner to help compensate for tensioning. All complications discussed, and the patient elected to proceed. DESCRIPTION OF PROCEDURE:  After the anesthetic was successfully induced, it was confirmed the patient did receive antibiotics, and leg lengths were determined. She was found to be a couple of millimeters long on the affected extremity. Patient was placed in lateral decubitus position, taking great care to pad all sensitive areas and ensure the pelvis was clear. The antibiotics confirmed given, and a time-out performed. Lateral approach. Good hemostasis achieved. IT band and the linea incised sharply. Sciatic nerve identified, protected, and avoided, and the Charnley retractor introduced. Sciatic nerve identified, protected, and avoided, and gluteus medius identified, somewhat attenuated due to disuse. Minimus and capsule divided directly over femoral neck carrying back to the tip of the greater trochanter anteriorly along down the vastus lateralis. Whole cuff of tissue was 1 thick, contiguous layer, and lesser trochanter identified. A pin placed in the superior iliac crest for offset leg length measurement, and hip dislocated uneventfully. We divided the femoral neck at the appropriate level, reflected the posterior capsule with a combination of electrocautery and Fuentes elevator. I instrumented around the acetabulum, protecting the neurovascular structures, and had to dig out the yanick to identify the medial wall. I did careful debridement of the cup as well, and the bone quality was very, very soft. We medialized appropriately, and the appropriate inclination and anteversion reamed up to accommodate the cup. I trialed this.   I was very pleased with it, implanted a definitive shell which seated nicely at the appropriate inclination and anteversion, and augmented it with a couple of screws, and removed some posterior inferior osteophyte. Used the Aquamantys and Exparel cocktail, initially with a trial liner, and then later for the definitive lateralized neutral liner, made sure it was fully and properly seated, checked it with the [place]. Protected with a Raytec gauze. Then, we broached up the femur. We had templated between a 3 and a 4. Her bone quality was super soft and I could actually push my thumb down the canal initially, and we maintained neutral alignment and broached up to the size 5, which was very stable. I made absolutely sure that we went back to the 4 and made sure that the we fully seated. The thigh broach could not be seated any further. Calcar planer, and then trialed the hip with the -5 ball, and lateralized liner with very good restoration of offset and leg lengths. Combined anteversion was very good. It is noted that she did have some increased anteversion on the femur, which was anatomically normal for her. With this in mind, the hip was extremely stable, and we referred back to the offset and leg length guide. Lengthening was a minimum of a couple of millimeters, at most, from preop. I could not go any shorter. The hip was very stable, and pulse lavaged out. I was very pleased with this. Routine closure, and clips for the skin. At the end of the case, instrument, sponge, needle count was correct. There were no complications. Patient tolerated the procedure well, and blood loss about 300. Excellent outcome of the case. I was very pleased with it. Will eventually have her see an endocrinologist for possible Forteo administration. She will be weightbearing as tolerated.       Salina Edwards MD AM / Marlen.Juan M  D: 08/14/2018 12:27     T: 08/14/2018 20:34  JOB #: [id number]

## 2018-08-15 NOTE — PROGRESS NOTES
Ortho    Pt. Seen and evaluated. Doing well, up in chair, pain well controlled  Denies cp, sob, abd pain    Visit Vitals    /66 (BP 1 Location: Left arm, BP Patient Position: At rest)    Pulse 66    Temp 97.6 °F (36.4 °C)    Resp 15    Ht 5' 6\" (1.676 m)    Wt 190 lb 6 oz (86.4 kg)    SpO2 99%    BMI 30.73 kg/m2       right total hip replacement  right hip Woundclean, dry, no drainage  Sensory intact to LT  Motor intact  nv intact  Neg calf tenderness. Labs. CBC  @  CBC:   Lab Results   Component Value Date/Time    WBC 6.2 08/02/2018 09:28 AM    RBC 4.11 (L) 08/02/2018 09:28 AM    HGB 12.4 08/02/2018 09:28 AM    HCT 38.2 08/02/2018 09:28 AM    PLATELET 604 54/68/5829 09:28 AM    and BMP:   Lab Results   Component Value Date/Time    Glucose 111 (H) 08/15/2018 04:20 AM    Sodium 141 08/15/2018 04:20 AM    Potassium 4.1 08/15/2018 04:20 AM    Chloride 109 (H) 08/15/2018 04:20 AM    CO2 25 08/15/2018 04:20 AM    BUN 21 (H) 08/15/2018 04:20 AM    Creatinine 1.01 08/15/2018 04:20 AM    Calcium 8.2 (L) 08/15/2018 04:20 AM   @  Coagulation  Lab Results   Component Value Date    INR 1.0 08/02/2018    APTT 32.0 08/02/2018      Basic Metabolic Profile  Lab Results   Component Value Date     08/15/2018    CO2 25 08/15/2018    BUN 21 (H) 08/15/2018         Assesment:rightOrthopedic / Rheumatologic: Total Hip Replacement  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)     RLQ - leakage from ileostomy    BMI 31.0-31.9,adult     31.8    Chronic pain     right leg pain; spine and hip down to ankle    Decreased exercise tolerance     mets <4 (SOB)    Depression     Diverticulitis     Fistula of enterostomy (HCC)     Hypertension     Incisional hernia     Sciatic pain     SOB (shortness of breath)      ASA: 3    Pt is status post joint replacement and at risk for bleeding, blood clots, and infection.      Plan:  aspirin, PT, home today if safe with PT and ok with medicine.

## 2018-08-15 NOTE — ROUTINE PROCESS
Bedside and Verbal shift change report given to Hari Johnson RN (oncoming nurse) by Ganga Rick RN (offgoing nurse). Report included the following information SBAR, Kardex, Procedure Summary, Intake/Output, MAR, Recent Results and Med Rec Status.

## 2018-08-15 NOTE — ROUTINE PROCESS
Pt given discharge instructions. Pt verified that each page and rx matched her name. IV d/cd no sign of infection noted. Pt d/cd to home. Pt stable.

## 2018-08-15 NOTE — CONSULTS
[facility]  Pulmonary, Critical Care, and Sleep Medicine    Name: Susan Aggarwal MRN: [id number]   : 1950 Hospital: [address] [place] [address] Dr   Date: 8/15/2018        Pulmonary Medicine-  Initial Patient Consult      IMPRESSION:   · OA- End stage  · S/P total hip arthroscopy- RIGHT  · Hypertension  · Obesity:  Body mass index is 30.73 kg/(m^2). · H/O depression  · Chronic foot drop- RIGHT      RECOMMENDATIONS:   · Keep HOB elevated; aspiration precautions  · SpO2 goal: >88%  · Pain management, wound care and prophylaxis per primary team  · PT/OT per primary team  · Advance diet as tolerated  · Hold parameters on BP meds  · D/C IV fluids  · Will continue to follow     Subjective/History: This patient has been seen and evaluated at the request of Dr. Mariann Caballero for post operative medical care  Patient is a 79 y.o. female  S/p right hip replacement 18. Currently resting comfortably in chair. Tolerating PO. Pain adequately controlled - currently 3-4/10. Has been working with PT. Tolerating PO. No nausea or emesis. No other complaints.           Patient Vitals for the past 24 hrs:   Temp Pulse Resp BP SpO2   08/15/18 0832 98 °F (36.7 °C) 64 17 112/65 100 %   08/15/18 0408 97.6 °F (36.4 °C) 66 15 102/66 99 %   08/15/18 0006 98.5 °F (36.9 °C) 65 16 120/69 97 %   18 2024 97.7 °F (36.5 °C) 63 17 114/69 98 %   18 1408 97.5 °F (36.4 °C) 81 15 126/67 99 %   18 1348 - 88 11 111/54 93 %   18 1338 - 84 10 120/52 94 %   18 1328 - 87 12 131/55 98 %   18 1318 - 92 13 137/62 100 %   18 1308 - 93 14 134/55 100 %   18 1258 - 92 12 126/54 100 %   18 1248 - 95 12 126/53 100 %   18 1239 97.5 °F (36.4 °C) (!) 104 16 135/55 100 %   18 1238 97.5 °F (36.4 °C) (!) 112 14 133/55 98 %   18 1022 - 71 18 135/62 98 %   18 1018 - 72 20 136/63 99 %   18 1012 - 77 14 147/70 99 %   18 1004 - 68 15 116/60 98 %   18 1003 - 77 16 103/69 96 %   08/14/18 0953 - 67 17 135/58 98 %   08/14/18 0942 - 63 14 128/49 100 %   08/14/18 0922 - 66 10 128/59 100 %   08/14/18 0919 98.2 °F (36.8 °C) 70 20 142/53 100 %   08/14/18 0912 - 70 14 142/53 -           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)     RLQ - leakage from ileostomy    BMI 31.0-31.9,adult     31.8    Chronic pain     right leg pain; spine and hip down to ankle    Decreased exercise tolerance     mets <4 (SOB)    Depression     Diverticulitis     Fistula of enterostomy (HCC)     Hypertension     Incisional hernia     Sciatic pain     SOB (shortness of breath)       Past Surgical History:   Procedure Laterality Date    FLEXIBLE SIGMOIDOSCOPY N/A 4/26/2017    FLEXIBLE SIGMOIDOSCOPY  performed by Kel Talbert MD at [address] [facility]  04/2017    reversal of ileostomy    HX HEENT      tonsillectomy    HX HERNIA REPAIR      HX KNEE REPLACEMENT N/A     right elbow repair    HX KNEE REPLACEMENT Right     HX SMALL BOWEL RESECTION      ileostomy formation    HX QUENTIN AND BSO      HX TONSILLECTOMY      HX TUBAL LIGATION N/A     reopened tubes    HX TUBAL LIGATION      HX UROLOGICAL  03/01/2017    s/p Placement of bilateral double-J stents - 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 Authorizing Provider   buffered aspirin (BUFFERIN) 325 mg tablet Take 1 Tab by mouth two (2) times a day. 8/15/18  Yes Larissa Stacy PA-C   celecoxib (CELEBREX) 200 mg capsule Take 1 Cap by mouth two (2) times a day for 90 days. 8/15/18 11/13/18 Yes Larissa Stacy PA-C   oxyCODONE-acetaminophen (PERCOCET)  mg per tablet Take 1-2 Tabs by mouth every four (4) hours as needed for Pain. Max Daily Amount: 12 Tabs.  8/15/18  Yes Larissa Stacy PA-C   ferrous sulfate (IRON) 325 mg (65 mg iron) EC tablet Take 1 Tab by mouth daily. 8/15/18  Yes Yecenia Hernandez PA-C   lisinopril (PRINIVIL, ZESTRIL) 20 mg tablet Take  by mouth daily. Yes Historical Provider   acetaminophen (TYLENOL) 650 mg TbER Take 650 mg by mouth every eight (8) hours. Indications: Arthritic Pain   Yes Historical Provider   potassium chloride (KLOR-CON M20) 20 mEq tablet Take  by mouth two (2) times a day. Yes Historical Provider   furosemide (LASIX) 20 mg tablet Take  by mouth two (2) times a day. Indications: Edema   Yes Historical Provider   magnesium gluconate 500 mg (27 mg  elemental) tablet Take  by mouth two (2) times a day. Yes Historical Provider   cyanocobalamin (VITAMIN B-12) 1,000 mcg tablet Take 1,000 mcg by mouth daily. Yes Historical Provider   MULTIVITAMIN WITH IRON (CHILDREN'S VITAMIN WITH IRON PO) Take 1 Tab by mouth daily. Yes Historical Provider   vitamin E (AQUA GEMS) 400 unit capsule Take 400 Units by mouth. Yes Historical Provider   HYDROcodone-acetaminophen (NORCO) 5-325 mg per tablet Take 1-2 Tabs by mouth every six (6) hours as needed for Pain. Max Daily Amount: 8 Tabs. 7/10/18   Janie Rodriguez MD   oxyCODONE-acetaminophen (PERCOCET) 5-325 mg per tablet Take 1-2 Tabs by mouth every four (4) hours as needed for Pain. Max Daily Amount: 12 Tabs.  4/27/18   Nely Bravo MD     Current Facility-Administered Medications   Medication Dose Route Frequency    WARFARIN INFORMATION NOTE (COUMADIN)   Other Q24H    furosemide (LASIX) tablet 20 mg  20 mg Oral BID    lisinopril (PRINIVIL, ZESTRIL) tablet 20 mg  20 mg Oral DAILY    magnesium gluconate tablet 27 mg [elemental]  27 mg Oral BID    potassium chloride (K-DUR, KLOR-CON) SR tablet 20 mEq  20 mEq Oral BID    dextrose 5 % - 0.45% NaCl infusion  100 mL/hr IntraVENous CONTINUOUS    sodium chloride (NS) flush 5-10 mL  5-10 mL IntraVENous Q8H    acetaminophen (TYLENOL) tablet 1,000 mg  1,000 mg Oral Q6H    magnesium hydroxide (MILK OF MAGNESIA) 400 mg/5 mL oral suspension 30 mL 30 mL Oral DAILY    celecoxib (CELEBREX) capsule 200 mg  200 mg Oral BID    pregabalin (LYRICA) capsule 50 mg  50 mg Oral BID    buffered aspirin (BUFFERIN) tablet 325 mg  325 mg Oral BID     Allergies   Allergen Reactions    Ancef [Cefazolin] Itching and Hives    Cefazolin Hives      Social History   Substance Use Topics    Smoking status: Former Smoker     Quit date: 2017    Smokeless tobacco: Never Used    Alcohol use No      Family History   Problem Relation Age of Onset    Heart Disease Mother     Heart Attack Mother     Other Mother     No Known Problems Other     Other Sister      hip dysplasia    Other Daughter      hip dysplasia        Review of Systems:  Pertinent items are noted in HPI. Objective:   Vital Signs:    Visit Vitals    /65 (BP 1 Location: Left arm, BP Patient Position: At rest)    Pulse 64    Temp 98 °F (36.7 °C)    Resp 17    Ht 5' 6\" (1.676 m)    Wt 86.4 kg (190 lb 6 oz)    SpO2 100%    BMI 30.73 kg/m2       O2 Device: Room air   O2 Flow Rate (L/min): 2 l/min   Temp (24hrs), Av.8 °F (36.6 °C), Min:97.5 °F (36.4 °C), Max:98.5 °F (36.9 °C)       Intake/Output:   Last shift:         Last 3 shifts:  1901 - 08/15 0700  In: 726.7 [P.O.:240; I.V.:486.7]  Out: 800 [Urine:800]    Intake/Output Summary (Last 24 hours) at 08/15/18 0847  Last data filed at 08/15/18 0408   Gross per 24 hour   Intake           726.67 ml   Output              800 ml   Net           -73.33 ml       Physical Exam:    General:  Alert, cooperative, no distress, appears stated age. Head:  Normocephalic, without obvious abnormality, atraumatic. Eyes:  Conjunctivae/corneas clear. PERRL, EOMs intact. Nose: Nares normal. Septum midline. Mucosa normal. No drainage or sinus tenderness. Throat: Lips, mucosa, and tongue normal. Teeth and gums normal.   Neck: Supple, symmetrical, trachea midline, no adenopathy, thyroid: no enlargment/tenderness/nodules, no carotid bruit and no JVD. Back:   Symmetric, no curvature. ROM normal.   Lungs:   Clear to auscultation bilaterally. Chest wall:  No tenderness or deformity. Heart:  Regular rate and rhythm, S1, S2 normal, no murmur, click, rub or gallop. Abdomen:   Soft, Obese, old surgical scars- well healed non-tender. Bowel sounds normal. No masses,  No organomegaly. Extremities: Extremities normal, atraumatic, no cyanosis or edema. EXCEPT: .- right hip with dressing - clean and intact- weak dorsiflexion of right foot- good plantar flexion- OLD finding per patient. No numbness   Pulses: 2+ and symmetric all extremities. Skin: Skin color, texture, turgor normal. No rashes or lesions   Lymph nodes:      Cervical, supraclavicular, and axillary nodes normal.   Neurologic: Grossly nonfocal       Data:     Recent Results (from the past 24 hour(s))   METABOLIC PANEL, BASIC    Collection Time: 08/15/18  4:20 AM   Result Value Ref Range    Sodium 141 136 - 145 mmol/L    Potassium 4.1 3.5 - 5.5 mmol/L    Chloride 109 (H) 100 - 108 mmol/L    CO2 25 21 - 32 mmol/L    Anion gap 7 3.0 - 18 mmol/L    Glucose 111 (H) 74 - 99 mg/dL    BUN 21 (H) 7.0 - 18 MG/DL    Creatinine 1.01 0.6 - 1.3 MG/DL    BUN/Creatinine ratio 21 (H) 12 - 20      GFR est AA >60 >60 ml/min/1.73m2    GFR est non-AA 55 (L) >60 ml/min/1.73m2    Calcium 8.2 (L) 8.5 - 10.1 MG/DL           Lab Results   Component Value Date/Time    WBC 6.2 08/02/2018 09:28 AM    HGB 12.4 08/02/2018 09:28 AM    HCT 38.2 08/02/2018 09:28 AM    PLATELET 257 59/80/5064 09:28 AM    MCV 92.9 08/02/2018 09:28 AM         Imaging:  I have personally reviewed the patients radiographs and have reviewed the reports:  Hip: Right 8/14/18  FINDINGS:      Total right hip replacement is demonstrated. The femoral head is seated within  the acetabular cup.  The intramedullary component of the femoral stem is well  contained within the medullary canal. There are droplets of gas in the soft  tissues secondary to the recent surgery. Overlying skin staples demonstrated.     IMPRESSION:     Satisfactory appearance of the total right hip arthroplasty.     CXR: 6/27/18  IMPRESSION:     Calcified granuloma in right middle lobe and calcified lymph nodes at the right  pulmonary hilum and right paratracheal area, consistent with old granulomatous  process.     Otherwise there is no evidence of acute or active cardiopulmonary abnormality.     Normal cardiac size, without cardiac decompensation.                  Total clinical care time exclusive of procedures:30 minutes  Bk Ashley DO

## 2018-08-15 NOTE — DISCHARGE INSTRUCTIONS
DISCHARGE SUMMARY from Nurse    PATIENT INSTRUCTIONS:    After general anesthesia or intravenous sedation, for 24 hours or while taking prescription Narcotics:  · Limit your activities  · Do not drive and operate hazardous machinery  · Do not make important personal or business decisions  · Do  not drink alcoholic beverages  · If you have not urinated within 8 hours after discharge, please contact your surgeon on call. Report the following to your surgeon:  · Excessive pain, swelling, redness or odor of or around the surgical area  · Temperature over 100.5  · Nausea and vomiting lasting longer than 4 hours or if unable to take medications  · Any signs of decreased circulation or nerve impairment to extremity: change in color, persistent  numbness, tingling, coldness or increase pain  · Any questions    What to do at Home:  Recommended activity: Activity as tolerated,     If you experience any of the following symptoms  Fever, chills, shortness of breath, please follow up with md.    *  Please give a list of your current medications to your Primary Care Provider. *  Please update this list whenever your medications are discontinued, doses are      changed, or new medications (including over-the-counter products) are added. *  Please carry medication information at all times in case of emergency situations. These are general instructions for a healthy lifestyle:    No smoking/ No tobacco products/ Avoid exposure to second hand smoke  Surgeon General's Warning:  Quitting smoking now greatly reduces serious risk to your health.     Obesity, smoking, and sedentary lifestyle greatly increases your risk for illness    A healthy diet, regular physical exercise & weight monitoring are important for maintaining a healthy lifestyle    You may be retaining fluid if you have a history of heart failure or if you experience any of the following symptoms:  Weight gain of 3 pounds or more overnight or 5 pounds in a week, increased swelling in our hands or feet or shortness of breath while lying flat in bed. Please call your doctor as soon as you notice any of these symptoms; do not wait until your next office visit. Recognize signs and symptoms of STROKE:    F-face looks uneven    A-arms unable to move or move unevenly    S-speech slurred or non-existent    T-time-call 911 as soon as signs and symptoms begin-DO NOT go       Back to bed or wait to see if you get better-TIME IS BRAIN. Warning Signs of HEART ATTACK     Call 911 if you have these symptoms:   Chest discomfort. Most heart attacks involve discomfort in the center of the chest that lasts more than a few minutes, or that goes away and comes back. It can feel like uncomfortable pressure, squeezing, fullness, or pain.  Discomfort in other areas of the upper body. Symptoms can include pain or discomfort in one or both arms, the back, neck, jaw, or stomach.  Shortness of breath with or without chest discomfort.  Other signs may include breaking out in a cold sweat, nausea, or lightheadedness. Don't wait more than five minutes to call 911 - MINUTES MATTER! Fast action can save your life. Calling 911 is almost always the fastest way to get lifesaving treatment. Emergency Medical Services staff can begin treatment when they arrive -- up to an hour sooner than if someone gets to the hospital by car. The discharge information has been reviewed with the patient. The patient verbalized understanding. Discharge medications reviewed with the patient and appropriate educational materials and side effects teaching were provided.   ___________________________________________________________________________________________________________________________________

## 2018-08-15 NOTE — PROGRESS NOTES
Reason for Admission:   OSTEOARTHRITIS OF RIGHT HIP                   RRAT Score:    10                 Plan for utilizing home health:    ORDERED                      Likelihood of Readmission:  LOW                         Transition of Care Plan:   [address] [facility] Management Interventions  PCP Verified by CM: Yes (DR. Cici Mcgee)  Palliative Care Criteria Met (RRAT>21 & CHF Dx)?: No  Mode of Transport at Discharge: Other (see comment) (FAMILY WILL TRANSPORT)  Transition of Care Consult (CM Consult): Home Health, Discharge Planning  [address] [address]: No  Reason Outside [place]:  (PT CHOSE [address] [place] [address])  Physical Therapy Consult: Yes  Occupational Therapy Consult: Yes  Current Support Network: Lives with Spouse, Own Home, Family Lives Nearby (PT IS CARETAKER FOR INVALID SPOUSE)  Confirm Follow Up Transport: Family  Plan discussed with Pt/Family/Caregiver: Yes ( [facility] [address])  [place] of Choice Offered: Yes  [place] Resource Information Provided?: No  Discharge Location  Discharge Placement: Home with home health   Pt reports that she will be transported to home by family members. This pt was provided [facility] for this pt who chose [facility], pt placed in e-discharge for referral acceptance.

## 2018-08-15 NOTE — PROGRESS NOTES
Problem: Self Care Deficits Care Plan (Adult)  Goal: *Acute Goals and Plan of Care (Insert Text)  Outcome: Resolved/Met Date Met: 08/15/18  Occupational Therapy EVALUATION/discharge    Patient: Harry River (72 y.o. female)  Date: 8/15/2018  Primary Diagnosis: Osteoarthritis of right hip, unspecified osteoarthritis type [M16.11]  Procedure(s) (LRB):  RIGHT TOTAL HIP ARTHROPLASTY LATERAL APPROACH (Right) 1 Day Post-Op   Precautions:   WBAT, Total hip    ASSESSMENT AND RECOMMENDATIONS:  Based on the objective data described below, the patient presents with s/p R total hip arthroplasty and new total hip precautions. Pt able to state all hip precautions. Pt states she has adapted many thing at home for continued independence however does not have AE. Pt educated on tub transfer bench for safety with tub transfers as well as maintaining hip precautions when transferring to tub/shower. Pt educated on use of sock aide and reacher for dressing and LH sponge for bathing. Pt demonstrated use of sock aide and reacher MI. Pt demonstrates WFL BUE ROM and strength and good safety awareness. Skilled occupational therapy is not indicated at this time. Discharge Recommendations: None  Further Equipment Recommendations for Discharge: N/A      Barriers to Learning/Limitations: None  Compensate with: visual, verbal, tactile, kinesthetic cues/model     COMPLEXITY     Eval Complexity: History: LOW Complexity : Brief history review ; Examination: LOW Complexity : 1-3 performance deficits relating to physical, cognitive , or psychosocial skils that result in activity limitations and / or participation restrictions ; Decision Making:MEDIUM Complexity : Patient may present with comorbidities that affect occupational performnce.  Miniml to moderate modification of tasks or assistance (eg, physical or verbal ) with assesment(s) is necessary to enable patient to complete evaluation  Assessment: low Complexity        G-CODES: Self Care  Current  CI= 1-19%   Goal  CI= 1-19%   D/C  CI= 1-19%. The severity rating is based on the Level of Assistance required for Functional Mobility and ADLs. 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     OBJECTIVE DATA SUMMARY:     Past Medical History:   Diagnosis Date    Altered bowel elimination due to intestinal ostomy ([facility] [address]     RLQ - leakage from ileostomy    BMI 31.0-31.9,adult     31.8    Chronic pain     right leg pain; spine and hip down to ankle    Decreased exercise tolerance     mets <4 (SOB)    Depression     Diverticulitis     Fistula of enterostomy (HCC)     Hypertension     Incisional hernia     Sciatic pain     SOB (shortness of breath)      Past Surgical History:   Procedure Laterality Date    FLEXIBLE SIGMOIDOSCOPY N/A 4/26/2017    FLEXIBLE SIGMOIDOSCOPY  performed by Chiqui Moore MD at [address] HX GI  04/2017    reversal of ileostomy    HX HEENT      tonsillectomy    HX HERNIA REPAIR      HX KNEE REPLACEMENT N/A     right elbow repair    HX KNEE REPLACEMENT Right     HX SMALL BOWEL RESECTION      ileostomy formation    HX QUENTIN AND BSO      HX TONSILLECTOMY      HX TUBAL LIGATION N/A     reopened tubes    HX TUBAL LIGATION      HX UROLOGICAL  03/01/2017    s/p Placement of bilateral double-J stents - 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 Spouse lives with pt but unable to assist her.    Home Situation  Home Environment: Private residence  # Steps to Enter: 3  Rails to Enter: No  One/Two Story Residence: Two story  # of Interior Steps: 15  Lift Chair Available: No  Living Alone: No  Support Systems: Spouse/Significant Other/Partner  Patient Expects to be Discharged to[de-identified] Private residence  Current DME Used/Available at Home: Walker, rolling, [place], straight, Commode, bedside, Shower chair  Tub or Shower Type: Tub/Shower combination  [x]     Right hand dominant   []     Left hand dominant  Cognitive/Behavioral Status:  Neurologic State: Alert  Orientation Level: Oriented X4  Cognition: Appropriate decision making; Follows commands; Appropriate safety awareness  Safety/Judgement: Home safety; Fall prevention    Skin: intact BUEs    Edema: none noted BUEs  Coordination:  Coordination: Within functional limits (BUEs)  Fine Motor Skills-Upper: Right Intact; Left Intact    Gross Motor Skills-Upper: Left Intact; Right Intact  Balance:  Sitting: Intact  Strength:  Strength: Within functional limits (BUEs)   Tone & Sensation:  Tone: Normal (BUEs)  Sensation: Intact (BUEs)   Range of Motion:  AROM: Within functional limits (BUEs)     ADL Assessment:  Feeding: Independent  Oral Facial Hygiene/Grooming: Independent  Bathing: Supervision  Upper Body Dressing: Independent  Lower Body Dressing: Modified independent  Toileting: Modified independent   ADL Intervention:   Cognitive Retraining  Safety/Judgement: Home safety; Fall prevention  Pain:  Pt reports 0/10 pain or discomfort prior to treatment.    Pt reports 0/10 pain or discomfort post treatment. Activity Tolerance:   Good    Please refer to the flowsheet for vital signs taken during this treatment. After treatment:   [x]  Patient left in no apparent distress sitting up in chair  []  Patient left in no apparent distress in bed  [x]  Call bell left within reach  []  Nursing notified  []  Caregiver present  []  Bed alarm activated    COMMUNICATION/EDUCATION:   Communication/Collaboration:  [x]      Home safety education was provided and the patient/caregiver indicated understanding. [x]      Patient/family have participated as able and agree with findings and recommendations. []      Patient is unable to participate in plan of care at this time.     Adolfo Harrison OT  Time Calculation: 28 mins

## 2018-08-15 NOTE — ANESTHESIA POSTPROCEDURE EVALUATION
Post-Anesthesia Evaluation and Assessment    Patient: Liliam Constantino MRN: [id number]  SSN: [id number]    YOB: 1950  Age: 79 y.o. Sex: female       Cardiovascular Function/Vital Signs  Visit Vitals    /69 (BP 1 Location: Left arm, BP Patient Position: At rest)    Pulse 63    Temp 36.5 °C (97.7 °F)    Resp 17    Ht 5' 6\" (1.676 m)    Wt 86.4 kg (190 lb 6 oz)    SpO2 98%    BMI 30.73 kg/m2       Patient is status post general, regional anesthesia for Procedure(s):  RIGHT TOTAL HIP ARTHROPLASTY LATERAL APPROACH. Nausea/Vomiting: None    Postoperative hydration reviewed and adequate. Pain:  Pain Scale 1: Numeric (0 - 10) (08/14/18 1643)  Pain Intensity 1: 0 (08/14/18 1643)   Managed    Neurological Status:   Neuro (WDL): Within Defined Limits (08/14/18 1238)  Neuro  Neurologic State: Alert (08/14/18 1800)  Orientation Level: Oriented X4 (08/14/18 1800)  Cognition: Follows commands (08/14/18 1800)  LUE Motor Response: Purposeful (08/14/18 1238)  LLE Motor Response: Purposeful (08/14/18 1238)  RUE Motor Response: Purposeful (08/14/18 1238)  RLE Motor Response: Numbness; Purposeful;Spontaneous ; Tingling (08/14/18 1410)   At baseline    Mental Status and Level of Consciousness: Arousable    Pulmonary Status:   O2 Device: Room air (08/14/18 1328)   Adequate oxygenation and airway patent    Complications related to anesthesia: None    Post-anesthesia assessment completed.  No concerns    Signed By: Matilda Kwan MD     August 14, 2018

## 2018-08-16 ENCOUNTER — TELEPHONE (OUTPATIENT)
Dept: ORTHOPEDIC SURGERY | Facility: CLINIC | Age: 68
End: 2018-08-16

## 2018-08-16 DIAGNOSIS — Z96.641 STATUS POST TOTAL REPLACEMENT OF RIGHT HIP: Primary | ICD-10-CM

## 2018-08-16 RX ORDER — NALOXONE HYDROCHLORIDE 4 MG/.1ML
SPRAY NASAL
Qty: 1 EACH | Refills: 0 | Status: SHIPPED | OUTPATIENT
Start: 2018-08-16 | End: 2019-04-17

## 2018-08-16 NOTE — TELEPHONE ENCOUNTER
Patient called in to see how long the prior auths take. She is in pain and needs her medication. Please advise patient at [phone].

## 2018-08-16 NOTE — TELEPHONE ENCOUNTER
Please obtain pre-auth    If unable to obtain will need to get a rx for a different pain medication.

## 2018-08-16 NOTE — TELEPHONE ENCOUNTER
Patient called back to advise Dr. Juhi Esparza office that she has not had any medication since yesterday and to request that we call her at her daughter's phone # Vaughn Alvarado, ok per HIPAA) to advise once prescriptions have been taken care of - [phone]

## 2018-08-16 NOTE — TELEPHONE ENCOUNTER
Returned patients call. She is upset that a Narcan was prescribed as its$125. She states \"i am not an addict\". Advised that us prescribing Narcan has in no-way anything to do with us thinking she is an addict. Advised that it is a new regulatory law that we do have to prescribe them. States she will call the pharmacy to  her rx, and if any problems she will call the office.

## 2018-08-16 NOTE — TELEPHONE ENCOUNTER
The requested drug triggered a safety flag. The patient has reached a dose of pain medicine where the provider/pharmacist needs to review risks with the patient before dispensing the requested drug. The pharmacist then needs to enter what is called a pharmacy professional service ([address]) code. They do not require approval from [facility] to do this. If the pharmacy has questions, they can contact the [facility] [employer] call center help desk at [phone].

## 2018-08-16 NOTE — TELEPHONE ENCOUNTER
Called and spoke to pharmacist. States the issue was the Morphine Equivalent. Advised that Dr Rupesh Reyes wants this patient to take this medication as prescribed. Pharmacist then stated that a Narcan rx would need to be filled. Spoke to Dr Nargis Cortez who ok'd the rx for Narcan. Narcan order was placed. Attempted to call patient, had to leave generic message as there was no voicemail greeting. If patient calls back, please advise her that she can  her rx at her pharmacy at her earliest convenience.

## 2018-08-16 NOTE — TELEPHONE ENCOUNTER
Patient called in states that she needs a prior auth for her oxyCODONE IR (OXY-IR) 15 mg immediate release tablet. Patient states that she is in pain and could really use the medication. Please advise patient at [phone].

## 2018-08-16 NOTE — TELEPHONE ENCOUNTER
Patient called in states that she needs a prior auth for her celecoxib (CELEBREX) 200 mg capsule   Please advise patient at [phone].

## 2018-08-17 ENCOUNTER — TELEPHONE (OUTPATIENT)
Dept: ORTHOPEDIC SURGERY | Age: 68
End: 2018-08-17

## 2018-08-17 NOTE — TELEPHONE ENCOUNTER
Patient called to report to Dr. Mane Ivan that her temperature went up to 101.2 last night for about an hour. She is following all post-op instructions, been drinking as directed, etc.  She has no pain, bandages are normal, but she wanted Dr. Mane Ivan to be aware of the elevated temperature.   Patient can be reached if necessary at [phone]

## 2018-08-17 NOTE — TELEPHONE ENCOUNTER
Thank you for call, please have patient call PCP ASAP, to be certain that she does not have (lung problem and or an asymptomatic UTI).  If temp raises above 100.5 and does not reduce with OTC Tyl / Motrin, please go directly to ER>

## 2018-08-20 ENCOUNTER — TELEPHONE (OUTPATIENT)
Dept: ORTHOPEDIC SURGERY | Facility: CLINIC | Age: 68
End: 2018-08-20

## 2018-08-20 NOTE — TELEPHONE ENCOUNTER
Message given to Army Cordoba in the [facility] to call patient to schedule an appointment with JASEN Aragon per JASEN Friedman.

## 2018-08-20 NOTE — TELEPHONE ENCOUNTER
Just an FYI   Patient states that yesterday 08/19/18 she was using the toilet and her hip popped out and back in. Her home health nurse advised her to go to the [facility] in [place] so she did that. She stated that they did xrays and found no break, cracks or anything to what vicente did.

## 2018-08-21 ENCOUNTER — OFFICE VISIT (OUTPATIENT)
Dept: ORTHOPEDIC SURGERY | Facility: CLINIC | Age: 68
End: 2018-08-21

## 2018-08-21 VITALS
OXYGEN SATURATION: 95 % | HEART RATE: 101 BPM | TEMPERATURE: 99.1 F | DIASTOLIC BLOOD PRESSURE: 62 MMHG | HEIGHT: 67 IN | WEIGHT: 190 LBS | SYSTOLIC BLOOD PRESSURE: 130 MMHG | BODY MASS INDEX: 29.82 KG/M2

## 2018-08-21 DIAGNOSIS — Z96.641 STATUS POST TOTAL REPLACEMENT OF RIGHT HIP: Primary | ICD-10-CM

## 2018-08-21 NOTE — PROGRESS NOTES
Patient: Garfield Doe  YOB: 1950       HISTORY:  The patient presents for reevaluation of her s/p right total hip arthroplasty on 8/14/18. Patient is improved, states pain is a 6 out of 10.  she has participated in H/H physical therapy. has been doing hip exercises at home. She comes in today because on Sunday she was sitting down on a toilet and felt her hip pop out and back in. She went to the urgent care and xrays were done. She comes in today for follow up. Patient denies any fever, chills, chest pain, shortness of breath or calf pain. There are no new illness or injuries to report since last seen in the office. PHYSICAL EXAMINATION:    Visit Vitals    /62    Pulse (!) 101    Temp 99.1 °F (37.3 °C) (Oral)    Ht 5' 6.5\" (1.689 m)    Wt 190 lb (86.2 kg)    SpO2 95%    BMI 30.21 kg/m2     The patient is a well-developed, well-nourished female in no acute distress. The patient is alert and oriented times three. The patient appears to be well groomed. Mood and affect are normal.   ORTHOPEDIC EXAM of Right Hip:  Inspection: Effusion present,  incisions clean, dry intact, staples in place  Walk: with a walker today  TTP: none  Range of motion: rotates without pain  Stability: Stable   Strength: N/A  2+ distal pulses    XR: 3 views of the Right Hip show good placement of total hip components. No evidence for loosening or fracture. No evidence for dislocation. IMPRESSION:  Status post Right total hip arthroplasty. Lateral Approach    PLAN:  Pt had episode of feeling like her right hip popped out and back in. Went over hip precautions today. She is feeling much better knowing her replacement looks good today on xray. She did not have the raised toilet seat when she felt this episode of her right hip popping out and back in. Stressed to patient that nothing causes an increase in pain or swelling. Patient is weight bearing as tolerated. Incisions cleaned.  Dressing reapplied today. OK to transition from walker to cane. Will set up outpt physical therapy at next visit. Patient not given a refill of pain medication. Patient will follow up in 1 week. For staple removal and evaluation.     [facility]

## 2018-08-30 ENCOUNTER — OFFICE VISIT (OUTPATIENT)
Dept: ORTHOPEDIC SURGERY | Facility: CLINIC | Age: 68
End: 2018-08-30

## 2018-08-30 VITALS
DIASTOLIC BLOOD PRESSURE: 59 MMHG | SYSTOLIC BLOOD PRESSURE: 146 MMHG | BODY MASS INDEX: 31.01 KG/M2 | WEIGHT: 197.6 LBS | TEMPERATURE: 99.1 F | RESPIRATION RATE: 18 BRPM | HEIGHT: 67 IN | OXYGEN SATURATION: 97 % | HEART RATE: 79 BPM

## 2018-08-30 DIAGNOSIS — Z96.641 STATUS POST TOTAL REPLACEMENT OF RIGHT HIP: Primary | ICD-10-CM

## 2018-08-30 DIAGNOSIS — M25.562 LEFT KNEE PAIN, UNSPECIFIED CHRONICITY: ICD-10-CM

## 2018-08-30 NOTE — PROGRESS NOTES
[address] [place] [address], [address] [place] [address] 
[phone] Patient: Raz Irby                MRN: [id number]       SSN: [id number] YOB: 1950        AGE: 79 y.o. SEX: female Body mass index is 31.42 kg/(m^2). PCP: Debbie Arriaga MD 
08/30/18 This office note has been dictated. REVIEW OF SYSTEMS: 
Constitutional: Negative for fever, chills, weight loss and malaise/fatigue. HENT: Negative. Eyes: Negative. Respiratory: Negative. Cardiovascular: Negative. Gastrointestinal: No bowel incontinence or constipation. Genitourinary: No bladder incontinence or saddle anesthesia. Skin: Negative. Neurological: Negative. Endo/Heme/Allergies: Negative. Psychiatric/Behavioral: Negative. Musculoskeletal: As per HPI above. Past Medical History:  
Diagnosis Date  Altered bowel elimination due to intestinal ostomy ([address] RLQ - leakage from ileostomy  BMI 31.0-31.9,adult 31.8  Chronic pain   
 right leg pain; spine and hip down to ankle  Decreased exercise tolerance   
 mets <4 (SOB)  Depression  Diverticulitis  Fistula of enterostomy ([address]  Hypertension  Incisional hernia  Sciatic pain  SOB (shortness of breath) Current Outpatient Prescriptions:  
  buffered aspirin (BUFFERIN) 325 mg tablet, Take 1 Tab by mouth two (2) times a day., Disp: 60 Tab, Rfl: 1 
  oxyCODONE IR (OXY-IR) 15 mg immediate release tablet, Take 1 Tab by mouth every three (3) hours as needed. Max Daily Amount: 120 mg., Disp: 60 Tab, Rfl: 0 
  lisinopril (PRINIVIL, ZESTRIL) 20 mg tablet, Take  by mouth daily. , Disp: , Rfl:  
  acetaminophen (TYLENOL) 650 mg TbER, Take 650 mg by mouth every eight (8) hours.  Indications: Arthritic Pain, Disp: , Rfl:  
  potassium chloride (KLOR-CON M20) 20 mEq tablet, Take  by mouth two (2) times a day., Disp: , Rfl:  
   furosemide (LASIX) 20 mg tablet, Take  by mouth two (2) times a day. Indications: Edema, Disp: , Rfl:  
  magnesium gluconate 500 mg (27 mg  elemental) tablet, Take  by mouth two (2) times a day., Disp: , Rfl:  
  cyanocobalamin (VITAMIN B-12) 1,000 mcg tablet, Take 1,000 mcg by mouth daily. , Disp: , Rfl:  
  MULTIVITAMIN WITH IRON (CHILDREN'S VITAMIN WITH IRON PO), Take 1 Tab by mouth daily. , Disp: , Rfl:  
  vitamin E (AQUA GEMS) 400 unit capsule, Take 400 Units by mouth., Disp: , Rfl:  
  naloxone (NARCAN) 4 mg/actuation nasal spray, Use 1 spray intranasally, then discard. Repeat with new spray every 2 min as needed for opioid overdose symptoms, alternating nostrils. , Disp: 1 Each, Rfl: 0 
  celecoxib (CELEBREX) 200 mg capsule, Take 1 Cap by mouth two (2) times a day for 90 days. , Disp: 60 Cap, Rfl: 2 
  ferrous sulfate (IRON) 325 mg (65 mg iron) EC tablet, Take 1 Tab by mouth daily. , Disp: 30 Tab, Rfl: 1 Allergies Allergen Reactions  Ancef [Cefazolin] Itching and Hives  Cefazolin Hives Social History Social History  Marital status:  Spouse name: N/A  
 Number of children: N/A  
 Years of education: N/A Occupational History  Not on file. Social History Main Topics  Smoking status: Former Smoker Quit date: 1/1/2017  Smokeless tobacco: Never Used  Alcohol use No  
 Drug use: No  
 Sexual activity: Not on file Other Topics Concern  Not on file Social History Narrative ** Merged History Encounter ** Past Surgical History:  
Procedure Laterality Date  FLEXIBLE SIGMOIDOSCOPY N/A 4/26/2017 FLEXIBLE SIGMOIDOSCOPY  performed by Marcelino Bruner MD at [facility] . HX GI  04/2017  
 reversal of ileostomy  HX HEENT    
 tonsillectomy  HX HERNIA REPAIR    
 HX KNEE REPLACEMENT N/A   
 right elbow repair  HX KNEE REPLACEMENT Right  HX SMALL BOWEL RESECTION    
 ileostomy formation  HX QUENTIN AND BSO  HX TONSILLECTOMY  HX TUBAL LIGATION N/A   
 reopened tubes  HX TUBAL LIGATION    
 HX UROLOGICAL  03/01/2017  
 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   
 
 
 
 
 
We did see Ms. Lynch for followup with regards to her right, lateral approach hip replacement. She is doing quite well. She did feel a pop in her leg last Sunday. She went to [facility]. X-rays were done, which were negative. She came to our office this past Tuesday and saw Mason Bobo P.A.-C. Repeat x-rays were done showing no abnormalities. She states the hip is feeling quite good. She now recognizes it may have been her knee that popped on her. She has had a knee replacement done elsewhere. She has a little bit of bruising to the outside part of her knee. She has had no recent fevers, chills, systemic changes, or injuries to report, and no chest pain or shortness of breath. PHYSICAL EXAMINATION:  In general, the patient is alert and oriented x 3 in no acute distress. The patient is well-developed, well-nourished, with a normal affect. The patient is afebrile. HEENT:  Head is normocephalic and atraumatic. Pupils are equally round and reactive to light and accommodation. Extraocular eye movements are intact. Neck is supple. Trachea is midline. No JVD is present. Breathing is nonlabored. Examination of the lower extremities reveals pain-free range of motion of the hips. The surgical wound at the right hip is healed up nicely. There is no erythema, no ecchymosis, no warmth, and no signs of infection or cellulitis present. Leg lengths look good. There is no calf tenderness. There is a negative Lizzy's sign. There are no signs for DVT present. The right knee reveals the skin is intact. There is no ecchymosis, no warmth, and no signs of infection or cellulitis present.   She has full range of motion of the knee. Again, there is some resolving ecchymosis. She does have a little discomfort to palpation to the IT band and a little discomfort to the LCL and does have some mid-flexion instability, which is acceptable. There is no defect noted to the extensor mechanism. RADIOGRAPHS:  Review of previous radiographs show total knee components are well-fixed without evidence of loosening noted. She does have a small line to the medial greater trochanter. She may have had a little crack in that. ASSESSMENT:  Status post right hip replacement. PLAN:  At this point, the staples are removed and replaced with Steri-Strips. We will get her into a little physical therapy. She is going to weight bear as tolerated. We are going to avoid any abduction exercises for now. We will see her back in two weeks time for evaluation and repeat x-ray of the right hip. She will call with any questions or concerns that shall arise.   
 
 
 
 
 
 
JR Zia ARNOLD, KYLE, ATC

## 2018-08-30 NOTE — PROGRESS NOTES
Repeat x-rays done today in the office, AP of the pelvis and AP and cross table of the left hip shows there may be a nondisplaced crack in the greater trochanter. This is unchanged from previous x-rays done on August 22, 2018. We will have her with no abduction exercises with physical therapy. She can continue weightbearing as tolerated. We will see her back in a week or two for reevaluation. We will plan on a repeat x-ray at that point. She will call with any questions or concerns that shall arise.

## 2018-09-17 ENCOUNTER — TELEPHONE (OUTPATIENT)
Dept: ORTHOPEDIC SURGERY | Facility: CLINIC | Age: 68
End: 2018-09-17

## 2018-09-17 NOTE — TELEPHONE ENCOUNTER
Post OP Patient called and is requesting to speak to Gustavo Wilson nurse . Patient said she has some questions she needs to ask the nurse before she comes to her appointment with Gustavo Wilson on 09/20/18. Patient did not want to tell me what it was in regards to. Patient just said she needed to ask some questions , but would not say what it was for. Patient tel. [phone].

## 2018-09-17 NOTE — TELEPHONE ENCOUNTER
Post op  8/14/18  Rt hip :    Reports rt knee pain level 6/10;Rt Hip level goes from 1/2 of a 10-sx 8/14/2018    Wants to know when can she drive after the hip sx. Would like to discuss what type of pain medications OTC or other.     Pt p#[phone]

## 2018-09-17 NOTE — TELEPHONE ENCOUNTER
Spoke with patient, notified her ok to drive 4-6 weeks from date of surgery. Also notified patient that JASEN Jha recommends taking OTC Tylenol as directed. 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 Patient inquired if there was any kind of cream that may help with her pain, I advised her that she could possible try Voltaren gel which was an arthritis cream. Patient also stated she does not particulary want to go to pain management but is questioning whether or not there is some sort of regimen she could be placed on for managing her pain. Advised patient to discuss this and other possible options at her office visit with GWEN on 9/20/18. Patient verbalized understanding and had no further questions at this time.

## 2018-09-20 ENCOUNTER — OFFICE VISIT (OUTPATIENT)
Dept: ORTHOPEDIC SURGERY | Facility: CLINIC | Age: 68
End: 2018-09-20

## 2018-09-20 VITALS
WEIGHT: 194.4 LBS | TEMPERATURE: 98 F | BODY MASS INDEX: 30.51 KG/M2 | HEIGHT: 67 IN | HEART RATE: 85 BPM | OXYGEN SATURATION: 100 % | RESPIRATION RATE: 18 BRPM | SYSTOLIC BLOOD PRESSURE: 150 MMHG | DIASTOLIC BLOOD PRESSURE: 72 MMHG

## 2018-09-20 DIAGNOSIS — M81.0 POST-MENOPAUSAL OSTEOPOROSIS: ICD-10-CM

## 2018-09-20 DIAGNOSIS — Z96.641 STATUS POST TOTAL REPLACEMENT OF RIGHT HIP: Primary | ICD-10-CM

## 2018-09-20 DIAGNOSIS — M25.561 ACUTE PAIN OF RIGHT KNEE: ICD-10-CM

## 2018-09-20 RX ORDER — HYDROCODONE BITARTRATE AND ACETAMINOPHEN 5; 325 MG/1; MG/1
1 TABLET ORAL
Qty: 21 TAB | Refills: 0 | Status: SHIPPED | OUTPATIENT
Start: 2018-09-20 | End: 2019-04-17

## 2018-09-20 RX ORDER — MELOXICAM 15 MG/1
TABLET ORAL
Qty: 30 TAB | Refills: 2 | Status: SHIPPED | OUTPATIENT
Start: 2018-09-20 | End: 2018-12-27 | Stop reason: SDUPTHER

## 2018-09-20 NOTE — PROGRESS NOTES
Patient: Marilu Ahumada                MRN: [id number]       SSN: [id number] YOB: 1950        AGE: 79 y.o. SEX: female Body mass index is 30.91 kg/(m^2). PCP: Shanique De Dios MD 
09/20/18 HISTORY: I had the pleasure of reviewing Ms. Cristopher Ann today. She is having very good pain relief with regards to the hip replacement. She is quite pleased with it. Her right knee is failing, and it is bothering her. Her left knee has known advanced to severe arthritis, which has been nonresponsive to cortisone. That is hurting her as well. The anti-inflammatory medications she really enjoyed having and would like some more. I have asked her to follow up with Dr. Yvette Mckenzie with regards to as well. At the last visit, my [de-identified] assistant diagnosed her with a minimally displaced, tip of the greater trochanteric fracture. We have been limiting her abduction exercises. At the time of surgery, her bone was extremely soft, and I will be repeating the DEXA bone scan. There were no fractures at the time of surgery and postop x-rays confirm this initially. Again, she is very pleased. PHYSICAL EXAMINATION:  She is walking well with her walker. The calf is nontender. Lizzy's sign is negative. The right knee replacement is fairly unstable in mid-flexion. The left knee has a mild effusion and joint line tenderness in all three compartments. Her hip wound has healed up beautifully, and she is neurologically intact. RADIOGRAPHS:  I did repeat the x-rays, AP of the pelvis and AP and lateral of the hip. The fracture has moved a few millimeters. PLAN:  We discussed the roll of surgery on this small fragment, and I do not think it is worth it for surgery. I think the repair will be nonsatisfying, mainly a suture repair. Having said this, we are going to hold off on abduction exercises for another three weeks or so.   We will get an x-ray in three weeks. When she returns, we will also repeat x-rays of the knees as well. I am going to take the liberty of sending her to Endocrinology. I think she would likely be a good candidate for Forteo or the equivalent medication, as it was really very severe osteoporosis at the time of surgery. It has been a pleasure to share in her care. We are going to start her with Mobic and some Norco and a hinged knee brace for the right knee replacement as well. REVIEW OF SYSTEMS:   
 
CON: negative for weight loss, fever EYE: negative for double vision ENT: negative for hoarseness RS:   negative for Tb 
GI:    negative for blood in stool :  negative for blood in urine Other systems reviewed and noted below. Past Medical History:  
Diagnosis Date  Altered bowel elimination due to intestinal ostomy ([address] RLQ - leakage from ileostomy  BMI 31.0-31.9,adult 31.8  Chronic pain   
 right leg pain; spine and hip down to ankle  Decreased exercise tolerance   
 mets <4 (SOB)  Depression  Diverticulitis  Fistula of enterostomy ([address]  Hypertension  Incisional hernia  Sciatic pain  SOB (shortness of breath) Family History Problem Relation Age of Onset  Heart Disease Mother  Heart Attack Mother [facility] Other Mother  No Known Problems Other  Other Sister   
  hip dysplasia  Other Daughter   
  hip dysplasia Current Outpatient Prescriptions Medication Sig Dispense Refill  meloxicam (MOBIC) 15 mg tablet 1 tab by mouth daily with food 30 Tab 2  
 HYDROcodone-acetaminophen (NORCO) 5-325 mg per tablet Take 1 Tab by mouth every eight (8) hours as needed for Pain. Max Daily Amount: 3 Tabs. 21 Tab 0  
 lisinopril (PRINIVIL, ZESTRIL) 20 mg tablet Take  by mouth daily.  acetaminophen (TYLENOL) 650 mg TbER Take 650 mg by mouth every eight (8) hours. Indications: Arthritic Pain  potassium chloride (KLOR-CON M20) 20 mEq tablet Take  by mouth two (2) times a day.  furosemide (LASIX) 20 mg tablet Take  by mouth two (2) times a day. Indications: Edema  magnesium gluconate 500 mg (27 mg  elemental) tablet Take  by mouth two (2) times a day.  cyanocobalamin (VITAMIN B-12) 1,000 mcg tablet Take 1,000 mcg by mouth daily.  MULTIVITAMIN WITH IRON (CHILDREN'S VITAMIN WITH IRON PO) Take 1 Tab by mouth daily.  vitamin E (AQUA GEMS) 400 unit capsule Take 400 Units by mouth.  naloxone (NARCAN) 4 mg/actuation nasal spray Use 1 spray intranasally, then discard. Repeat with new spray every 2 min as needed for opioid overdose symptoms, alternating nostrils. 1 Each 0  
 ferrous sulfate (IRON) 325 mg (65 mg iron) EC tablet Take 1 Tab by mouth daily. 30 Tab 1  
 oxyCODONE IR (OXY-IR) 15 mg immediate release tablet Take 1 Tab by mouth every three (3) hours as needed. Max Daily Amount: 120 mg. 60 Tab 0 Allergies Allergen Reactions  Ancef [Cefazolin] Itching and Hives  Cefazolin Hives Past Surgical History:  
Procedure Laterality Date  FLEXIBLE SIGMOIDOSCOPY N/A 4/26/2017 FLEXIBLE SIGMOIDOSCOPY  performed by Gary Whitaker MD at [place] [address]. HX GI  04/2017  
 reversal of ileostomy  HX HEENT    
 tonsillectomy  HX HERNIA REPAIR    
 HX KNEE REPLACEMENT N/A   
 right elbow repair  HX KNEE REPLACEMENT Right  HX SMALL BOWEL RESECTION    
 ileostomy formation  HX QUENTIN AND BSO  HX TONSILLECTOMY  HX TUBAL LIGATION N/A   
 reopened tubes  HX TUBAL LIGATION    
 HX UROLOGICAL  03/01/2017  
 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   
 
 
Social History Social History  Marital status:  Spouse name: N/A  
 Number of children: N/A  
 Years of education: N/A Occupational History  Not on file. Social History Main Topics  Smoking status: Former Smoker Quit date: 1/1/2017  Smokeless tobacco: Never Used  Alcohol use No  
 Drug use: No  
 Sexual activity: Not on file Other Topics Concern  Not on file Social History Narrative ** Merged History Encounter ** Visit Vitals  /72  Pulse 85  Temp 98 °F (36.7 °C) (Oral)  Resp 18  Ht 5' 6.5\" (1.689 m)  Wt 194 lb 6.4 oz (88.2 kg)  SpO2 100%  BMI 30.91 kg/m2 PHYSICAL EXAMINATION: 
GENERAL: Alert and oriented x3, in no acute distress, well-developed, well-nourished, afebrile. HEART: No JVD. EYES: No scleral icterus NECK: No significant lymphadenopathy LUNGS: No respiratory compromise or indrawing ABDOMEN: Soft, non-tender, non-distended. Electronically signed by:  Kimberly Rhoades MD

## 2018-10-04 ENCOUNTER — TELEPHONE (OUTPATIENT)
Dept: ORTHOPEDIC SURGERY | Facility: CLINIC | Age: 68
End: 2018-10-04

## 2018-10-04 NOTE — TELEPHONE ENCOUNTER
Patient calling back as she has not heard from [facility] to have the Bone Density done there. Patient stated Mili Peace w/ [facility] was going to send over order. Patient has f/u appt w/  Dr. Connie Perez 10/11. Need to have Bone Density sched asap .   Please call [facility] and then call patient at [phone] and please leave message

## 2018-10-05 ENCOUNTER — DOCUMENTATION ONLY (OUTPATIENT)
Dept: ORTHOPEDIC SURGERY | Facility: CLINIC | Age: 68
End: 2018-10-05

## 2018-10-05 NOTE — TELEPHONE ENCOUNTER
Attempted to contact patient:   [employer] was called about scheduling the Dexa scan that was ordered by Dr. Monika Suarez. I was informed that this patient is not due for another Dexa until December 2019, per her insurance. This information was left in a message for the patient with instructions to call us or her insurance carrier with any questions.

## 2018-10-05 NOTE — TELEPHONE ENCOUNTER
I left a very generic message on unidentified voice mail, [phone]. Bone Density testing is only covered once every 2 years. Her last testing was 12/14/17. She would be required to pay out of pocket and she would need to contact [facility] for the cost. Does she still want to proceed?

## 2018-10-11 ENCOUNTER — OFFICE VISIT (OUTPATIENT)
Dept: ORTHOPEDIC SURGERY | Facility: CLINIC | Age: 68
End: 2018-10-11

## 2018-10-11 VITALS
RESPIRATION RATE: 18 BRPM | SYSTOLIC BLOOD PRESSURE: 140 MMHG | OXYGEN SATURATION: 99 % | HEIGHT: 67 IN | TEMPERATURE: 97.8 F | BODY MASS INDEX: 30.48 KG/M2 | WEIGHT: 194.2 LBS | HEART RATE: 71 BPM | DIASTOLIC BLOOD PRESSURE: 64 MMHG

## 2018-10-11 DIAGNOSIS — M25.361 KNEE INSTABILITY, RIGHT: ICD-10-CM

## 2018-10-11 DIAGNOSIS — Z96.641 STATUS POST TOTAL REPLACEMENT OF RIGHT HIP: Primary | ICD-10-CM

## 2018-10-11 DIAGNOSIS — M17.12 PRIMARY OSTEOARTHRITIS OF LEFT KNEE: ICD-10-CM

## 2018-10-11 DIAGNOSIS — Z96.651 HISTORY OF RIGHT KNEE JOINT REPLACEMENT: ICD-10-CM

## 2018-10-11 DIAGNOSIS — M85.859 OSTEOPENIA OF THIGH, UNSPECIFIED LATERALITY: ICD-10-CM

## 2018-10-11 NOTE — PROGRESS NOTES
Patient: Gissel Velásquez                MRN: [id number]       SSN: [id number] YOB: 1950        AGE: 79 y.o. SEX: female Body mass index is 30.88 kg/(m^2). PCP: Stacey Gray MD 
10/11/18 HISTORY: Ms. Kailey Alba is actually doing quite nicely after her hip replacement. She is actually very pleased with her hip replacement. She has no significant problems. It is her right and left knee that are really driving her crazy. Dr. Tonya Prabhakar did her metal-backed patella, DePuy knee, and it is slowly failing due to instability and some lysis behind the femur, although it is not grossly loose. The left knee has end-staged arthritis. The last injection was not efficacious for her. She had an accessory ossicle fracture. It turns out to be with regards to her hip replacement. She has them on each hip, and the overall aspect of the greater trochanter is actually intact on that side. PHYSICAL EXAMINATION:  On examination today, she is actually walking very well. Her abduction strength, even though we have been avoiding abduction, is excellent. The right knee has mid-flexion instability. She had to discontinue her off-the-shelf brace because it was creating bruising. Left knee examination reveals valgus malalignment, bone-on-bone patellofemoral crepitus with terminal extension, and no cyanosis, peripheral edema, or clubbing. Her incision is nicely healed. RADIOGRAPHS:  Review of her x-rays shows the hip replacement looks terrific actually. The x-rays of the right knee show a fair bit of lysis behind the femur, and the left knee has findings consistent with end-staged arthritis. PLAN:  I think when the timing is correct, she should have her left knee done and, eventually, the right knee revision, which is a much bigger operation. We will let her recover. I am going to have her do abduction exercises against gravity.     
 
 
 
REVIEW OF SYSTEMS:   
 
 CON: negative for weight loss, fever EYE: negative for double vision ENT: negative for hoarseness RS:   negative for Tb 
GI:    negative for blood in stool :  negative for blood in urine Other systems reviewed and noted below. Past Medical History:  
Diagnosis Date  Altered bowel elimination due to intestinal ostomy ([facility] [address] RLQ - leakage from ileostomy  BMI 31.0-31.9,adult 31.8  Chronic pain   
 right leg pain; spine and hip down to ankle  Decreased exercise tolerance   
 mets <4 (SOB)  Depression  Diverticulitis  Fistula of enterostomy ([facility] [address] [facility]  Hypertension  Incisional hernia  Sciatic pain  SOB (shortness of breath) Family History Problem Relation Age of Onset  Heart Disease Mother  Heart Attack Mother Karmen Bain Other Mother  No Known Problems Other  Other Sister   
  hip dysplasia  Other Daughter   
  hip dysplasia Current Outpatient Prescriptions Medication Sig Dispense Refill  meloxicam (MOBIC) 15 mg tablet 1 tab by mouth daily with food 30 Tab 2  
 lisinopril (PRINIVIL, ZESTRIL) 20 mg tablet Take  by mouth daily.  acetaminophen (TYLENOL) 650 mg TbER Take 650 mg by mouth every eight (8) hours. Indications: Arthritic Pain  potassium chloride (KLOR-CON M20) 20 mEq tablet Take  by mouth two (2) times a day.  furosemide (LASIX) 20 mg tablet Take  by mouth two (2) times a day. Indications: Edema  magnesium gluconate 500 mg (27 mg  elemental) tablet Take  by mouth two (2) times a day.  cyanocobalamin (VITAMIN B-12) 1,000 mcg tablet Take 1,000 mcg by mouth daily.  MULTIVITAMIN WITH IRON (CHILDREN'S VITAMIN WITH IRON PO) Take 1 Tab by mouth daily.  vitamin E (AQUA GEMS) 400 unit capsule Take 400 Units by mouth.  HYDROcodone-acetaminophen (NORCO) 5-325 mg per tablet Take 1 Tab by mouth every eight (8) hours as needed for Pain. Max Daily Amount: 3 Tabs.  21 Tab 0  
  naloxone (NARCAN) 4 mg/actuation nasal spray Use 1 spray intranasally, then discard. Repeat with new spray every 2 min as needed for opioid overdose symptoms, alternating nostrils. 1 Each 0  
 ferrous sulfate (IRON) 325 mg (65 mg iron) EC tablet Take 1 Tab by mouth daily. 30 Tab 1  
 oxyCODONE IR (OXY-IR) 15 mg immediate release tablet Take 1 Tab by mouth every three (3) hours as needed. Max Daily Amount: 120 mg. 60 Tab 0 Allergies Allergen Reactions  Ancef [Cefazolin] Itching and Hives  Cefazolin Hives Past Surgical History:  
Procedure Laterality Date  FLEXIBLE SIGMOIDOSCOPY N/A 4/26/2017 FLEXIBLE SIGMOIDOSCOPY  performed by Bubba Taylor MD at [facility] . HX GI  04/2017  
 reversal of ileostomy  HX HEENT    
 tonsillectomy  HX HERNIA REPAIR    
 HX KNEE REPLACEMENT N/A   
 right elbow repair  HX KNEE REPLACEMENT Right  HX SMALL BOWEL RESECTION    
 ileostomy formation  HX QUENTIN AND BSO  HX TONSILLECTOMY  HX TUBAL LIGATION N/A   
 reopened tubes  HX TUBAL LIGATION    
 HX UROLOGICAL  03/01/2017  
 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   
 
 
Social History Social History  Marital status:  Spouse name: N/A  
 Number of children: N/A  
 Years of education: N/A Occupational History  Not on file. Social History Main Topics  Smoking status: Former Smoker Quit date: 1/1/2017  Smokeless tobacco: Never Used  Alcohol use No  
 Drug use: No  
 Sexual activity: Not on file Other Topics Concern  Not on file Social History Narrative ** Merged History Encounter ** Visit Vitals  /64  Pulse 71  Temp 97.8 °F (36.6 °C) (Oral)  Resp 18  Ht 5' 6.5\" (1.689 m)  Wt 194 lb 3.2 oz (88.1 kg)  SpO2 99%  BMI 30.88 kg/m2 PHYSICAL EXAMINATION: 
 GENERAL: Alert and oriented x3, in no acute distress, well-developed, well-nourished, afebrile. HEART: No JVD. EYES: No scleral icterus NECK: No significant lymphadenopathy LUNGS: No respiratory compromise or indrawing ABDOMEN: Soft, non-tender, non-distended. Electronically signed by:  Kayli Odell MD

## 2018-10-11 NOTE — PATIENT INSTRUCTIONS
[address] Phone: [phone] [place]:  
[address] [place], [address] [place]: 
[address] [place]: 
[address], [address]

## 2018-10-18 ENCOUNTER — TELEPHONE (OUTPATIENT)
Dept: ORTHOPEDIC SURGERY | Age: 68
End: 2018-10-18

## 2018-10-18 NOTE — TELEPHONE ENCOUNTER
Patient called to follow up on a form (?) that Dr. Ailyn Petersen was supposed to fax to physical therapy. No other information provided, patient is requesting a call back as soon as possible since this has been going on for quite a while.   Please contact patient at [phone]

## 2018-10-19 NOTE — TELEPHONE ENCOUNTER
Spoke with Mrs. Kimberly Valdez, she states [facility] has sent over a form to be signed by Leida Rodrigues PA-C. She states she needs written permission to do abduction exercises and how often she needs to have PT. Form will be placed in 's red folder at [facility] to sign. Please fax to [phone] when completed. Patient also inquiring about Endocrinology referral. Patient states either our office is lackadaisical or Dr. Gardiner  office is, as she has not heard from anyone. I advised patient I would send this to our schedulers and someone will give her a call.

## 2018-10-23 NOTE — TELEPHONE ENCOUNTER
Rob Castro and Dr. Stover were both gone on Friday afternoon 10/19/18 when I first spoke with Mrs. Diane Reinoso, due to they had surgery that afternoon. Rob Castro is not back at the [facility] office until Thursday afternoon, I will have him sign form then.

## 2018-10-23 NOTE — TELEPHONE ENCOUNTER
Clotilde from [facility] called and said they never received the form from [address] [place] [address]. Valeri Jung is requesting the form be faxed. Fax # [phone]. Tel. [phone]. Note : I was just told that the form has not been signed by JASEN Nguyen yet due to they are in Surgery. That the form will be signed on Thursday.

## 2018-10-25 NOTE — TELEPHONE ENCOUNTER
Form signed by JASEN Rooney and faxed to [facility] to fax # [phone], copy of PT order also faxed. Confirmation received. Copy sent to scanning.

## 2018-11-29 ENCOUNTER — OFFICE VISIT (OUTPATIENT)
Dept: ORTHOPEDIC SURGERY | Facility: CLINIC | Age: 68
End: 2018-11-29

## 2018-11-29 VITALS
OXYGEN SATURATION: 100 % | RESPIRATION RATE: 18 BRPM | HEART RATE: 70 BPM | WEIGHT: 197 LBS | BODY MASS INDEX: 30.92 KG/M2 | HEIGHT: 67 IN | TEMPERATURE: 97.4 F | DIASTOLIC BLOOD PRESSURE: 52 MMHG | SYSTOLIC BLOOD PRESSURE: 127 MMHG

## 2018-11-29 DIAGNOSIS — Z96.641 STATUS POST TOTAL REPLACEMENT OF RIGHT HIP: ICD-10-CM

## 2018-11-29 DIAGNOSIS — M25.361 KNEE INSTABILITY, RIGHT: ICD-10-CM

## 2018-11-29 DIAGNOSIS — M85.80 OSTEOPENIA DETERMINED BY X-RAY: ICD-10-CM

## 2018-11-29 DIAGNOSIS — Z96.651 HISTORY OF RIGHT KNEE JOINT REPLACEMENT: Primary | ICD-10-CM

## 2018-11-29 DIAGNOSIS — Z78.0 POST-MENOPAUSAL: ICD-10-CM

## 2018-11-29 RX ORDER — ASCORBIC ACID 500 MG
1000 TABLET ORAL DAILY
COMMUNITY
End: 2019-04-17

## 2018-11-29 NOTE — PROGRESS NOTES
Patient: Ani Castaneda                MRN: [id number]       SSN: [id number] YOB: 1950        AGE: 79 y.o. SEX: female Body mass index is 31.32 kg/m². PCP: Bernadette Crabtree MD 
11/29/18 HISTORY: Ms. Mckenzie Garcia returns in follow-up. She has done extremely well with her hip replacement. She had very soft bone at the time of surgery and was concerned. Having said that, she is doing great and would like her to do abduction exercises while lying on her side. She is certainly ready for it. Her abductors are actually firing very nicely today and she is about 85% to 90% strong already, doing very well. Her left knee is driving her crazy. She would like surgery for it. We are going to repeat her bone density scan. If it is very, very soft, I am going to recommend some bone strengthening agents prior to surgery. If it is reasonable, then we can do the knee surgery sooner than later. The right knee was done by Dr. Shakila Flynn, metal-backed patella. She has never been happy with it. She has mid flexion instability associated with it. Some lysis behind the femur and it will eventually require a revision. We are going to go with brace management for the time being. PHYSICAL EXAMINATION:  Above-mentioned. She looks great, moves her head and neck adequately. There is no respiratory compromise or indrawing. There is no scleral icterus. There is no JVD. Her incision has healed up nicely. She is actually improving significantly with her gait pattern as well. RADIOGRAPHS:  I did confirm from her x-rays, things look good in her hip replacement, lateralized liner and we paid specific attention to leg lengths for her as well. IMPRESSION:  My overall impression is hip replacement, recovering, doing very nicely; left knee end-stage arthritis.  
 
PLAN:  We will check with the bone density scan to see if she is safe for surgery and eventual right knee revision but try with brace management. She goes to see Dr. Samia Cordova in the not-too-distant future. We will get the bone density scan; it has been a year. I would like to see her back in a few weeks afterwards. REVIEW OF SYSTEMS:   
 
CON: negative for weight loss, fever EYE: negative for double vision ENT: negative for hoarseness RS:   negative for Tb 
GI:    negative for blood in stool :  negative for blood in urine Other systems reviewed and noted below. Past Medical History:  
Diagnosis Date  Altered bowel elimination due to intestinal ostomy ([address] RLQ - leakage from ileostomy  BMI 31.0-31.9,adult 31.8  Chronic pain   
 right leg pain; spine and hip down to ankle  Decreased exercise tolerance   
 mets <4 (SOB)  Depression  Diverticulitis  Fistula of enterostomy ([facility] [address] [facility]  Hypertension  Incisional hernia  Sciatic pain  SOB (shortness of breath) Family History Problem Relation Age of Onset  Heart Disease Mother  Heart Attack Mother Kevin Mccallum Other Mother  No Known Problems Other  Other Sister   
     hip dysplasia  Other Daughter   
     hip dysplasia Current Outpatient Medications Medication Sig Dispense Refill  ascorbic acid, vitamin C, (VITAMIN C) 500 mg tablet Take 1,000 mg by mouth daily.  meloxicam (MOBIC) 15 mg tablet 1 tab by mouth daily with food 30 Tab 2  
 lisinopril (PRINIVIL, ZESTRIL) 20 mg tablet Take  by mouth daily.  acetaminophen (TYLENOL) 650 mg TbER Take 650 mg by mouth every eight (8) hours. Indications: Arthritic Pain  potassium chloride (KLOR-CON M20) 20 mEq tablet Take  by mouth two (2) times a day.  furosemide (LASIX) 20 mg tablet Take  by mouth two (2) times a day. Indications: Edema  magnesium gluconate 500 mg (27 mg  elemental) tablet Take  by mouth two (2) times a day.  MULTIVITAMIN WITH IRON (CHILDREN'S VITAMIN WITH IRON PO) Take 1 Tab by mouth daily.  vitamin E (AQUA GEMS) 400 unit capsule Take 400 Units by mouth.  HYDROcodone-acetaminophen (NORCO) 5-325 mg per tablet Take 1 Tab by mouth every eight (8) hours as needed for Pain. Max Daily Amount: 3 Tabs. 21 Tab 0  
 naloxone (NARCAN) 4 mg/actuation nasal spray Use 1 spray intranasally, then discard. Repeat with new spray every 2 min as needed for opioid overdose symptoms, alternating nostrils. 1 Each 0  
 ferrous sulfate (IRON) 325 mg (65 mg iron) EC tablet Take 1 Tab by mouth daily. 30 Tab 1  
 oxyCODONE IR (OXY-IR) 15 mg immediate release tablet Take 1 Tab by mouth every three (3) hours as needed. Max Daily Amount: 120 mg. 60 Tab 0  
 cyanocobalamin (VITAMIN B-12) 1,000 mcg tablet Take 1,000 mcg by mouth daily. Allergies Allergen Reactions  Ancef [Cefazolin] Itching and Hives  Cefazolin Hives Past Surgical History:  
Procedure Laterality Date  FLEXIBLE SIGMOIDOSCOPY N/A 4/26/2017 FLEXIBLE SIGMOIDOSCOPY  performed by Judith Hdez MD at [facility] . HX GI  04/2017  
 reversal of ileostomy  HX HEENT    
 tonsillectomy  HX HERNIA REPAIR    
 HX KNEE REPLACEMENT N/A   
 right elbow repair  HX KNEE REPLACEMENT Right  HX SMALL BOWEL RESECTION    
 ileostomy formation  HX QUENTIN AND BSO  HX TONSILLECTOMY  HX TUBAL LIGATION N/A   
 reopened tubes  HX TUBAL LIGATION    
 HX UROLOGICAL  03/01/2017  
 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   
 
 
Social History Socioeconomic History  Marital status:  Spouse name: Not on file  Number of children: Not on file  Years of education: Not on file  Highest education level: Not on file Social Needs  Financial resource strain: Not on file  Food insecurity - worry: Not on file  Food insecurity - inability: Not on file  Transportation needs - medical: Not on file  Transportation needs - non-medical: Not on file Occupational History  Not on file Tobacco Use  Smoking status: Former Smoker Last attempt to quit: 2017 Years since quittin.9  Smokeless tobacco: Never Used Substance and Sexual Activity  Alcohol use: No  
 Drug use: No  
 Sexual activity: Not on file Other Topics Concern  Not on file Social History Narrative ** Merged History Encounter ** Visit Vitals /52 Pulse 70 Temp 97.4 °F (36.3 °C) (Oral) Resp 18 Ht 5' 6.5\" (1.689 m) Wt 197 lb (89.4 kg) SpO2 100% BMI 31.32 kg/m² PHYSICAL EXAMINATION: 
GENERAL: Alert and oriented x3, in no acute distress, well-developed, well-nourished, afebrile. HEART: No JVD. EYES: No scleral icterus NECK: No significant lymphadenopathy LUNGS: No respiratory compromise or indrawing ABDOMEN: Soft, non-tender, non-distended. Electronically signed by:  Daisha Alarcon MD

## 2018-11-29 NOTE — PATIENT INSTRUCTIONS
[address] Phone: [phone] [place]:  
[address] [place], [address] [place]: 
[address] [place]: 
[address] [facility], [address]

## 2018-12-10 ENCOUNTER — TELEPHONE (OUTPATIENT)
Dept: ORTHOPEDIC SURGERY | Facility: CLINIC | Age: 68
End: 2018-12-10

## 2018-12-10 NOTE — TELEPHONE ENCOUNTER
Patient called in states that Dr. Naima Ashley needs all office notes, lab results, and images if any sent to [phone] but Thursday 12/13/18 for pt appt. Please advise patient when sent at [phone].

## 2018-12-18 ENCOUNTER — HOSPITAL ENCOUNTER (OUTPATIENT)
Dept: BONE DENSITY | Age: 68
Discharge: HOME | End: 2018-12-18
Attending: ORTHOPAEDIC SURGERY
Payer: MEDICARE

## 2018-12-18 DIAGNOSIS — Z78.0 POST-MENOPAUSAL: ICD-10-CM

## 2018-12-18 PROCEDURE — 77080 DXA BONE DENSITY AXIAL: CPT

## 2018-12-27 DIAGNOSIS — Z96.641 STATUS POST TOTAL REPLACEMENT OF RIGHT HIP: ICD-10-CM

## 2019-01-04 RX ORDER — MELOXICAM 15 MG/1
TABLET ORAL
Qty: 30 TAB | Refills: 2 | Status: SHIPPED | OUTPATIENT
Start: 2019-01-04 | End: 2019-04-12 | Stop reason: SDUPTHER

## 2019-01-04 NOTE — TELEPHONE ENCOUNTER
01/04/2019 Patient called as she was told by [facility] KARMA DAWN [facility] SANDRA in [place] they has sent over request for refill of Meloxicam several times.   Patient is now out of Meloxicam. Please call [facility] at [phone] today so patient can get her medication today  Please call patient back at [phone]

## 2019-01-31 ENCOUNTER — OFFICE VISIT (OUTPATIENT)
Dept: ORTHOPEDIC SURGERY | Age: 69
End: 2019-01-31

## 2019-01-31 VITALS
WEIGHT: 203 LBS | RESPIRATION RATE: 16 BRPM | DIASTOLIC BLOOD PRESSURE: 68 MMHG | HEIGHT: 67 IN | SYSTOLIC BLOOD PRESSURE: 129 MMHG | TEMPERATURE: 98.3 F | HEART RATE: 82 BPM | BODY MASS INDEX: 31.86 KG/M2 | OXYGEN SATURATION: 99 %

## 2019-01-31 DIAGNOSIS — M25.551 RIGHT HIP PAIN: ICD-10-CM

## 2019-01-31 DIAGNOSIS — M25.562 CHRONIC PAIN OF LEFT KNEE: ICD-10-CM

## 2019-01-31 DIAGNOSIS — M17.12 PRIMARY OSTEOARTHRITIS OF LEFT KNEE: Primary | ICD-10-CM

## 2019-01-31 DIAGNOSIS — G89.29 CHRONIC PAIN OF LEFT KNEE: ICD-10-CM

## 2019-01-31 RX ORDER — HYDROCODONE BITARTRATE AND ACETAMINOPHEN 7.5; 325 MG/1; MG/1
1 TABLET ORAL
Qty: 28 TAB | Refills: 0 | Status: SHIPPED | OUTPATIENT
Start: 2019-01-31 | End: 2019-04-17

## 2019-01-31 NOTE — PROGRESS NOTES
[address] [facility], [address] [facility]  [phone]           Patient: Danni Julian                MRN: [id number]       SSN: [id number]  YOB: 1950        AGE: 79 y.o. SEX: female  Body mass index is 32.27 kg/m². PCP: Angeline Garcia MD  01/31/19      This office note has been dictated. REVIEW OF SYSTEMS:  Constitutional: Negative for fever, chills, weight loss and malaise/fatigue. HENT: Negative. Eyes: Negative. Respiratory: Negative. Cardiovascular: Negative. Gastrointestinal: No bowel incontinence or constipation. Genitourinary: No bladder incontinence or saddle anesthesia. Skin: Negative. Neurological: Negative. Endo/Heme/Allergies: Negative. Psychiatric/Behavioral: Negative. Musculoskeletal: As per HPI above. 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)     RLQ - leakage from ileostomy    BMI 31.0-31.9,adult     31.8    Chronic pain     right leg pain; spine and hip down to ankle    Decreased exercise tolerance     mets <4 (SOB)    Depression     Diverticulitis     Fistula of enterostomy (HCC)     Hypertension     Incisional hernia     Sciatic pain     SOB (shortness of breath)          Current Outpatient Medications:     meloxicam (MOBIC) 15 mg tablet, TAKE 1 TABLET BY MOUTH ONCE DAILY WITH FOOD, Disp: 30 Tab, Rfl: 2    ascorbic acid, vitamin C, (VITAMIN C) 500 mg tablet, Take 1,000 mg by mouth daily. , Disp: , Rfl:     lisinopril (PRINIVIL, ZESTRIL) 20 mg tablet, Take  by mouth daily. , Disp: , Rfl:     acetaminophen (TYLENOL) 650 mg TbER, Take 650 mg by mouth every eight (8) hours. Indications: Arthritic Pain, Disp: , Rfl:     potassium chloride (KLOR-CON M20) 20 mEq tablet, Take  by mouth two (2) times a day., Disp: , Rfl:     furosemide (LASIX) 20 mg tablet, Take  by mouth two (2) times a day.  Indications: Edema, Disp: , Rfl:     magnesium gluconate 500 mg (27 mg  elemental) tablet, Take  by mouth two (2) times a day., Disp: , Rfl:     cyanocobalamin (VITAMIN B-12) 1,000 mcg tablet, Take 1,000 mcg by mouth daily. , Disp: , Rfl:     HYDROcodone-acetaminophen (NORCO) 5-325 mg per tablet, Take 1 Tab by mouth every eight (8) hours as needed for Pain. Max Daily Amount: 3 Tabs., Disp: 21 Tab, Rfl: 0    naloxone (NARCAN) 4 mg/actuation nasal spray, Use 1 spray intranasally, then discard. Repeat with new spray every 2 min as needed for opioid overdose symptoms, alternating nostrils. , Disp: 1 Each, Rfl: 0    ferrous sulfate (IRON) 325 mg (65 mg iron) EC tablet, Take 1 Tab by mouth daily. , Disp: 30 Tab, Rfl: 1    oxyCODONE IR (OXY-IR) 15 mg immediate release tablet, Take 1 Tab by mouth every three (3) hours as needed. Max Daily Amount: 120 mg., Disp: 60 Tab, Rfl: 0    MULTIVITAMIN WITH IRON (CHILDREN'S VITAMIN WITH IRON PO), Take 1 Tab by mouth daily. , Disp: , Rfl:     vitamin E (AQUA GEMS) 400 unit capsule, Take 400 Units by mouth., Disp: , Rfl:     Allergies   Allergen Reactions    Ancef [Cefazolin] Itching and Hives    Cefazolin Hives       Social History     Socioeconomic History    Marital status:      Spouse name: Not on file    Number of children: Not on file    Years of education: Not on file    Highest education level: Not on file   Social Needs    Financial resource strain: Not on file    Food insecurity - worry: Not on file    Food insecurity - inability: Not on file   [employer] needs - medical: Not on file   [employer] needs - non-medical: Not on file   Occupational History    Not on file   Tobacco Use    Smoking status: Former Smoker     Last attempt to quit: 2017     Years since quittin.0    Smokeless tobacco: Never Used   Substance and Sexual Activity    Alcohol use: No    Drug use: No    Sexual activity: Not on file   Other Topics Concern    Not on file   Social History Narrative    ** Merged History Encounter **            Past Surgical History:   Procedure Laterality Date    FLEXIBLE SIGMOIDOSCOPY N/A 4/26/2017    FLEXIBLE SIGMOIDOSCOPY  performed by Celeste Crabtree MD at [address] HX GI  04/2017    reversal of ileostomy    HX HEENT      tonsillectomy    HX HERNIA REPAIR      HX KNEE REPLACEMENT N/A     right elbow repair    HX KNEE REPLACEMENT Right     HX SMALL BOWEL RESECTION      ileostomy formation    HX QUENTIN AND BSO      HX TONSILLECTOMY      HX TUBAL LIGATION N/A     reopened tubes    HX TUBAL LIGATION      HX UROLOGICAL  03/01/2017    s/p Placement of bilateral double-J stents - 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 Lynch for followup with regards to her right hip and knee, as well as her left knee. She has had a right hip replacement by Dr. Makayla North and did well. She had a right knee replacement done elsewhere. She does have known end-staged arthritis of the left knee, which she is really fed up and frustrated with. She would like to move forward with getting it replaced. She would like it done some time this spring, however. It really acted up on her recently without injury. She reports pain on the inside part of the knee. The pain is keeping her up at night. She has pain with ambulation. PHYSICAL EXAMINATION:  In general, the patient is alert and oriented x 3 in no acute distress. The patient is well-developed, well-nourished, with a normal affect. The patient is afebrile. HEENT:  Head is normocephalic and atraumatic. Pupils are equally round and reactive to light and accommodation. Extraocular eye movements are intact. Neck is supple. Trachea is midline. No JVD is present. Breathing is nonlabored. Examination of the lower extremities reveals pain-free range of motion of the hips. There is no pain to palpation of the greater trochanteric bursae.   There is negative straight leg raise. There is negative calf tenderness. There is negative Lizzys. There is no evidence of DVT present. The left knee reveals the skin is intact. There is no ecchymosis, no warmth, and no signs of infection or cellulitis present. Findings are consistent with advanced arthritis of the left knee with pain to palpation tricompartmentally and crepitus arising from the anterior compartment. RADIOGRAPHS:  Review of previous radiographs shows end-staged arthritis of the left knee with bone-to-bone eburnation tricompartmentally and valgus malalignment noted. ASSESSMENT:      1. Status post right hip replacement doing well. 2. Right knee replacement done elsewhere with instability and early loosening. 3. Left knee end-stage osteoarthritis. PLAN:  At this point, we are going to move forward with getting her scheduled for left knee replacement. The alternatives, risks, and complications were discussed. The patient understands and wishes to proceed with surgery. We will give her a one-week supply of Norco, mainly for her night discomfort. She will call with any questions or concerns that shall arise.                       JR iZa ARNOLD, KYLE, ATC

## 2019-04-02 DIAGNOSIS — Z01.818 PREOP EXAMINATION: Primary | ICD-10-CM

## 2019-04-02 DIAGNOSIS — M17.12 PRIMARY OSTEOARTHRITIS OF LEFT KNEE: ICD-10-CM

## 2019-04-12 DIAGNOSIS — Z96.641 STATUS POST TOTAL REPLACEMENT OF RIGHT HIP: ICD-10-CM

## 2019-04-16 RX ORDER — MELOXICAM 15 MG/1
TABLET ORAL
Qty: 90 TAB | Refills: 0 | Status: SHIPPED | OUTPATIENT
Start: 2019-04-16 | End: 2019-04-30

## 2019-04-17 ENCOUNTER — HOSPITAL ENCOUNTER (OUTPATIENT)
Dept: PREADMISSION TESTING | Age: 69
Discharge: HOME | End: 2019-04-17
Payer: MEDICARE

## 2019-04-17 ENCOUNTER — HOSPITAL ENCOUNTER (OUTPATIENT)
Dept: GENERAL RADIOLOGY | Age: 69
Discharge: HOME | End: 2019-04-17
Payer: MEDICARE

## 2019-04-17 DIAGNOSIS — Z01.818 PREOP EXAMINATION: ICD-10-CM

## 2019-04-17 DIAGNOSIS — M17.12 PRIMARY OSTEOARTHRITIS OF LEFT KNEE: ICD-10-CM

## 2019-04-17 LAB
ABO + RH BLD: NORMAL
ALBUMIN SERPL-MCNC: 4.2 G/DL (ref 3.4–5)
ANION GAP SERPL CALC-SCNC: 8 MMOL/L (ref 3–18)
APPEARANCE UR: CLEAR
APTT PPP: 30.9 SEC (ref 23–36.4)
ATRIAL RATE: 65 BPM
BASOPHILS # BLD: 0 K/UL (ref 0–0.1)
BASOPHILS NFR BLD: 0 % (ref 0–2)
BILIRUB UR QL: NEGATIVE
BLOOD GROUP ANTIBODIES SERPL: NORMAL
BUN SERPL-MCNC: 38 MG/DL (ref 7–18)
BUN/CREAT SERPL: 16 (ref 12–20)
CALCIUM SERPL-MCNC: 10.4 MG/DL (ref 8.5–10.1)
CALCULATED P AXIS, ECG09: 37 DEGREES
CALCULATED T AXIS, ECG11: 45 DEGREES
CHLORIDE SERPL-SCNC: 107 MMOL/L (ref 100–108)
CO2 SERPL-SCNC: 26 MMOL/L (ref 21–32)
COLOR UR: YELLOW
CREAT SERPL-MCNC: 2.35 MG/DL (ref 0.6–1.3)
DIAGNOSIS, 93000: NORMAL
DIFFERENTIAL METHOD BLD: NORMAL
EOSINOPHIL # BLD: 0.1 K/UL (ref 0–0.4)
EOSINOPHIL NFR BLD: 1 % (ref 0–5)
ERYTHROCYTE [DISTWIDTH] IN BLOOD BY AUTOMATED COUNT: 13.4 % (ref 11.6–14.5)
EST. AVERAGE GLUCOSE BLD GHB EST-MCNC: 123 MG/DL
GLUCOSE SERPL-MCNC: 112 MG/DL (ref 74–99)
GLUCOSE UR STRIP.AUTO-MCNC: NEGATIVE MG/DL
HBA1C MFR BLD: 5.9 % (ref 4.2–5.6)
HCT VFR BLD AUTO: 40.1 % (ref 35–45)
HGB BLD-MCNC: 12.9 G/DL (ref 12–16)
HGB UR QL STRIP: NEGATIVE
INR PPP: 1 (ref 0.8–1.2)
KETONES UR QL STRIP.AUTO: ABNORMAL MG/DL
LEUKOCYTE ESTERASE UR QL STRIP.AUTO: NEGATIVE
LYMPHOCYTES # BLD: 3.4 K/UL (ref 0.9–3.6)
LYMPHOCYTES NFR BLD: 38 % (ref 21–52)
MCH RBC QN AUTO: 30 PG (ref 24–34)
MCHC RBC AUTO-ENTMCNC: 32.2 G/DL (ref 31–37)
MCV RBC AUTO: 93.3 FL (ref 74–97)
MONOCYTES # BLD: 0.6 K/UL (ref 0.05–1.2)
MONOCYTES NFR BLD: 7 % (ref 3–10)
NEUTS SEG # BLD: 4.8 K/UL (ref 1.8–8)
NEUTS SEG NFR BLD: 54 % (ref 40–73)
NITRITE UR QL STRIP.AUTO: NEGATIVE
P-R INTERVAL, ECG05: 146 MS
PH UR STRIP: 5 [PH] (ref 5–8)
PLATELET # BLD AUTO: 265 K/UL (ref 135–420)
PMV BLD AUTO: 11.6 FL (ref 9.2–11.8)
POTASSIUM SERPL-SCNC: 4.2 MMOL/L (ref 3.5–5.5)
PROT UR STRIP-MCNC: NEGATIVE MG/DL
PROTHROMBIN TIME: 13 SEC (ref 11.5–15.2)
Q-T INTERVAL, ECG07: 418 MS
QRS DURATION, ECG06: 82 MS
QTC CALCULATION (BEZET), ECG08: 434 MS
RBC # BLD AUTO: 4.3 M/UL (ref 4.2–5.3)
SODIUM SERPL-SCNC: 141 MMOL/L (ref 136–145)
SP GR UR REFRACTOMETRY: 1.03 (ref 1–1.03)
SPECIMEN EXP DATE BLD: NORMAL
UROBILINOGEN UR QL STRIP.AUTO: 0.2 EU/DL (ref 0.2–1)
VENTRICULAR RATE, ECG03: 65 BPM
WBC # BLD AUTO: 9 K/UL (ref 4.6–13.2)

## 2019-04-17 PROCEDURE — 82040 ASSAY OF SERUM ALBUMIN: CPT

## 2019-04-17 PROCEDURE — 87086 URINE CULTURE/COLONY COUNT: CPT

## 2019-04-17 PROCEDURE — 71046 X-RAY EXAM CHEST 2 VIEWS: CPT

## 2019-04-17 PROCEDURE — 80048 BASIC METABOLIC PNL TOTAL CA: CPT

## 2019-04-17 PROCEDURE — 85610 PROTHROMBIN TIME: CPT

## 2019-04-17 PROCEDURE — 83036 HEMOGLOBIN GLYCOSYLATED A1C: CPT

## 2019-04-17 PROCEDURE — 85730 THROMBOPLASTIN TIME PARTIAL: CPT

## 2019-04-17 PROCEDURE — 85025 COMPLETE CBC W/AUTO DIFF WBC: CPT

## 2019-04-17 PROCEDURE — 81003 URINALYSIS AUTO W/O SCOPE: CPT

## 2019-04-17 PROCEDURE — 36415 COLL VENOUS BLD VENIPUNCTURE: CPT

## 2019-04-17 PROCEDURE — 86900 BLOOD TYPING SEROLOGIC ABO: CPT

## 2019-04-17 PROCEDURE — 93005 ELECTROCARDIOGRAM TRACING: CPT

## 2019-04-17 RX ORDER — ZINC GLUCONATE 10 MG
LOZENGE ORAL DAILY
COMMUNITY

## 2019-04-17 RX ORDER — VITAMIN E 1000 UNIT
1000 CAPSULE ORAL DAILY
COMMUNITY

## 2019-04-17 NOTE — PROGRESS NOTES
Germán Ludwig has decided with their surgeon to have a joint replacement to improve mobility and decrease pain. Joint Replacement Pre-Operative class was attended today. Topics discussed included surgery preparation, what to expect the day of surgery, medications (to include a multimodal approach to pain control and limiting narcotics), nutrition, glycemic control, respiratory therapy, physical and occupational therapy, and discharge planning. Discussed the importance of using these alternative pain management methods with the goal of using less opioid use after surgery and at home. A patient education notebook was provided and the opportunity was given to ask questions. The phone number of the Orthopaedic  was provided for any future questions or concerns.

## 2019-04-17 NOTE — PERIOP NOTES
Mrs. Henry Allen was here today for her PAT appointment. Health assessment was completed and instructions given regarding NPO status, medications, Hibiclens washes, and removal of all jewelry and/or body piercing. Instructed patient not to remove the red [employer] armband that was placed on their arm when the Type and Screen was drawn. Instructed to bring walker to the hospital on the day of surgery so it can be properly adjusted by the physical therapist.  Leroy Mak was given to ask questions and all questions were answered. Understanding of instructions was verbalized.

## 2019-04-18 LAB
BACTERIA SPEC CULT: NORMAL
SERVICE CMNT-IMP: NORMAL

## 2019-04-24 ENCOUNTER — OFFICE VISIT (OUTPATIENT)
Dept: ORTHOPEDIC SURGERY | Facility: CLINIC | Age: 69
End: 2019-04-24

## 2019-04-24 VITALS
WEIGHT: 210.8 LBS | OXYGEN SATURATION: 98 % | HEIGHT: 66 IN | DIASTOLIC BLOOD PRESSURE: 52 MMHG | RESPIRATION RATE: 16 BRPM | HEART RATE: 67 BPM | TEMPERATURE: 98.4 F | SYSTOLIC BLOOD PRESSURE: 119 MMHG | BODY MASS INDEX: 33.88 KG/M2

## 2019-04-24 DIAGNOSIS — M17.12 PRIMARY OSTEOARTHRITIS OF LEFT KNEE: ICD-10-CM

## 2019-04-24 DIAGNOSIS — Z01.818 PRE-OP EXAM: Primary | ICD-10-CM

## 2019-04-24 RX ORDER — CELECOXIB 100 MG/1
400 CAPSULE ORAL ONCE
Status: CANCELLED | OUTPATIENT
Start: 2019-04-24 | End: 2019-04-24

## 2019-04-24 RX ORDER — DEXAMETHASONE SODIUM PHOSPHATE 4 MG/ML
8 INJECTION, SOLUTION INTRA-ARTICULAR; INTRALESIONAL; INTRAMUSCULAR; INTRAVENOUS; SOFT TISSUE
Status: CANCELLED | OUTPATIENT
Start: 2019-04-24 | End: 2019-04-24

## 2019-04-24 RX ORDER — PREGABALIN 25 MG/1
75 CAPSULE ORAL ONCE
Status: CANCELLED | OUTPATIENT
Start: 2019-04-24 | End: 2019-04-24

## 2019-04-24 RX ORDER — ACETAMINOPHEN 325 MG/1
1000 TABLET ORAL ONCE
Status: CANCELLED | OUTPATIENT
Start: 2019-04-24 | End: 2019-04-24

## 2019-04-24 RX ORDER — WARFARIN 1 MG/1
10 TABLET ORAL ONCE
Status: CANCELLED | OUTPATIENT
Start: 2019-04-24 | End: 2019-04-24

## 2019-04-24 RX ORDER — GLUCOSAMINE/CHONDR SU A SOD 750-600 MG
TABLET ORAL DAILY
COMMUNITY

## 2019-04-24 NOTE — H&P
[address], [address] [facility]  [phone]           HISTORY & PHYSICAL      Patient: Morena Tierney                MRN: [id number]       SSN: [id number]  YOB: 1950        AGE: 81 y.o. SEX: female  Body mass index is 34.02 kg/m². PCP: Delma Colby MD  04/24/19      CC: left knee end stage OA  Problem List Items Addressed This Visit     None      Visit Diagnoses     Pre-op exam    -  Primary    Relevant Orders    AMB POC XRAY, KNEE; COMPLETE, 4+ VIEW (Completed)    Primary osteoarthritis of left knee                HPI:  The patient is a pleasant 81 y.o. whom has end stage OA of their Left knee and has failed conservative treatment including but not limited to NSAIDS, cortisone injections, viscosupplementation, PT, and pain medicine. Due to the current findings and affected activities of daily living, surgical intervention is indicated. The alternatives, risks, complications, as well as expected outcome were discussed. These include but are not limited to infection, blood loss, need for blood transfusion, neurovascular damage, DVT, PE,  post-op stiffness and pain, leg length discrepancy, dislocation, anesthetic complications, prothesis longevity, need for more surgery, MI, stroke, and even death. The patient understands and wishes to proceed with surgery.       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)     RLQ - leakage from ileostomy    Arthritis     BMI 31.0-31.9,adult     31.8    Chronic pain     right leg pain; spine and hip down to ankle    Decreased exercise tolerance     mets <4 (SOB)    Depression     Diverticulitis     Fistula of enterostomy (HCC)     Hypertension     Incisional hernia     Sciatic pain     SOB (shortness of breath)          Current Outpatient Medications:     Biotin 2,500 mcg cap, Take  by mouth., Disp: , Rfl:   ascorbic acid, vitamin C, (VITAMIN C) 1,000 mg tablet, Take 1,000 mg by mouth., Disp: , Rfl:     magnesium 250 mg tab, Take  by mouth daily. , Disp: , Rfl:     meloxicam (MOBIC) 15 mg tablet, TAKE 1 TABLET BY MOUTH ONCE DAILY WITH FOOD, Disp: 90 Tab, Rfl: 0    lisinopril (PRINIVIL, ZESTRIL) 20 mg tablet, Take  by mouth daily. , Disp: , Rfl:     acetaminophen (TYLENOL) 650 mg TbER, Take 650 mg by mouth every eight (8) hours. Takes 1 or 2 as needed  Indications: Pain associated with Arthritis, Disp: , Rfl:     potassium chloride (KLOR-CON M20) 20 mEq tablet, Take  by mouth daily. , Disp: , Rfl:     furosemide (LASIX) 20 mg tablet, Take  by mouth two (2) times a day. Indications: Edema, Disp: , Rfl:     cyanocobalamin (VITAMIN B-12) 1,000 mcg tablet, Take 1,000 mcg by mouth daily. , Disp: , Rfl:     vitamin E (AQUA GEMS) 400 unit capsule, Take 800 Units by mouth daily. , Disp: , Rfl:     Allergies   Allergen Reactions    Ancef [Cefazolin] Itching and Hives    Cefazolin Hives       Social History     Socioeconomic History    Marital status:      Spouse name: Not on file    Number of children: Not on file    Years of education: Not on file    Highest education level: Not on file   Occupational History    Not on file   Social Needs    Financial resource strain: Not on file    Food insecurity:     Worry: Not on file     Inability: Not on file    Transportation needs:     Medical: Not on file     Non-medical: Not on file   Tobacco Use    Smoking status: Former Smoker     Last attempt to quit: 2017     Years since quittin.3    Smokeless tobacco: Never Used   Substance and Sexual Activity    Alcohol use: No    Drug use: No    Sexual activity: Not on file   Lifestyle    Physical activity:     Days per week: Not on file     Minutes per session: Not on file    Stress: Not on file   Relationships    Social connections:     Talks on phone: Not on file     Gets together: Not on file Attends Holiness service: Not on file     Active member of club or organization: Not on file     Attends meetings of clubs or organizations: Not on file     Relationship status: Not on file    Intimate partner violence:     Fear of current or ex partner: Not on file     Emotionally abused: Not on file     Physically abused: Not on file     Forced sexual activity: Not on file   Other Topics Concern    Not on file   Social History Narrative    ** Merged History Encounter **            Past Surgical History:   Procedure Laterality Date    FLEXIBLE SIGMOIDOSCOPY N/A 4/26/2017    FLEXIBLE SIGMOIDOSCOPY  performed by Gary Whitaker MD at [address] HX CHOLECYSTECTOMY  04/2018    HX GI  04/2017    reversal of ileostomy    HX HEENT      tonsillectomy    HX HERNIA REPAIR      HX KNEE REPLACEMENT N/A     right elbow repair    HX KNEE REPLACEMENT Right     HX OTHER SURGICAL  03/2017    reversal of ostomy     HX SMALL BOWEL RESECTION      ileostomy formation    HX QUENTIN AND BSO      HX TONSILLECTOMY      HX TUBAL LIGATION N/A     reopened tubes    HX TUBAL LIGATION      HX UROLOGICAL  03/01/2017    s/p Placement of bilateral double-J stents - 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 PE:  Visit Vitals  /52   Pulse 67   Temp 98.4 °F (36.9 °C) (Oral)   Resp 16   Ht 5' 6\" (1.676 m)   Wt 210 lb 12.8 oz (95.6 kg)   SpO2 98%   BMI 34.02 kg/m²     A&O X3, NAD, well develop, well nourished  Heart: S1-S2, rrr  Lungs: CTA bilat  Abd: soft, nt, nt, + bs in all quadrants  Ext:  Pos distal pulses to DP, PT      X-ray: left knee shows end stage OA    Labs: labs were reviewed and wnl.  ua neg    A:  Left  knee end stage OA    P:  At this point we will move forward with surgery. Again, the alternatives, risks, complications, as well as expected outcome were discussed and the patient wishes to proceed with surgery.   Pt has been instructed to stop aspirin, nsaids, rheumatologic medications and blood thinners. They have also been instructed to continue on any heart and bp meds and to take them the morning of surgery with sips of water. The patient will require in-patient admission. Admission as an in-patient is reasonable and necessary due to increased risk of surgery due to the factors indicated as well as the possible need for prolonged in-hospital or skilled post-acute care in order to improve this patient's functional ability.         Radha Caballero

## 2019-04-24 NOTE — H&P (VIEW-ONLY)
[address], [address] [place] [id number] [phone] HISTORY & PHYSICAL Patient: El Groves                MRN: [id number]       SSN: [id number] YOB: 1950        AGE: 81 y.o. SEX: female Body mass index is 34.02 kg/m². PCP: Ashlyn Cortes MD 
04/24/19 CC: left knee end stage OA Problem List Items Addressed This Visit None Visit Diagnoses Pre-op exam    -  Primary Relevant Orders AMB POC XRAY, KNEE; COMPLETE, 4+ VIEW (Completed) Primary osteoarthritis of left knee HPI:  The patient is a pleasant 81 y.o. whom has end stage OA of their Left knee and has failed conservative treatment including but not limited to NSAIDS, cortisone injections, viscosupplementation, PT, and pain medicine. Due to the current findings and affected activities of daily living, surgical intervention is indicated. The alternatives, risks, complications, as well as expected outcome were discussed. These include but are not limited to infection, blood loss, need for blood transfusion, neurovascular damage, DVT, PE,  post-op stiffness and pain, leg length discrepancy, dislocation, anesthetic complications, prothesis longevity, need for more surgery, MI, stroke, and even death. The patient understands and wishes to proceed with surgery. Past Medical History:  
Diagnosis Date  Altered bowel elimination due to intestinal ostomy ([address] RLQ - leakage from ileostomy  Arthritis  BMI 31.0-31.9,adult 31.8  Chronic pain   
 right leg pain; spine and hip down to ankle  Decreased exercise tolerance   
 mets <4 (SOB)  Depression  Diverticulitis  Fistula of enterostomy ([address]  Hypertension  Incisional hernia  Sciatic pain  SOB (shortness of breath) Current Outpatient Medications:   Biotin 2,500 mcg cap, Take  by mouth., Disp: , Rfl:  
 [place]   ascorbic acid, vitamin C, (VITAMIN C) 1,000 mg tablet, Take 1,000 mg by mouth., Disp: , Rfl:  
  magnesium 250 mg tab, Take  by mouth daily. , Disp: , Rfl:  
  meloxicam (MOBIC) 15 mg tablet, TAKE 1 TABLET BY MOUTH ONCE DAILY WITH FOOD, Disp: 90 Tab, Rfl: 0 
  lisinopril (PRINIVIL, ZESTRIL) 20 mg tablet, Take  by mouth daily. , Disp: , Rfl:  
  acetaminophen (TYLENOL) 650 mg TbER, Take 650 mg by mouth every eight (8) hours. Takes 1 or 2 as needed  Indications: Pain associated with Arthritis, Disp: , Rfl:  
  potassium chloride (KLOR-CON M20) 20 mEq tablet, Take  by mouth daily. , Disp: , Rfl:  
  furosemide (LASIX) 20 mg tablet, Take  by mouth two (2) times a day. Indications: Edema, Disp: , Rfl:  
  cyanocobalamin (VITAMIN B-12) 1,000 mcg tablet, Take 1,000 mcg by mouth daily. , Disp: , Rfl:  
  vitamin E (AQUA GEMS) 400 unit capsule, Take 800 Units by mouth daily. , Disp: , Rfl:  
 
Allergies Allergen Reactions  Ancef [Cefazolin] Itching and Hives  Cefazolin Hives Social History Socioeconomic History  Marital status:  Spouse name: Not on file  Number of children: Not on file  Years of education: Not on file  Highest education level: Not on file Occupational History  Not on file Social Needs  Financial resource strain: Not on file  Food insecurity:  
  Worry: Not on file Inability: Not on file  Transportation needs:  
  Medical: Not on file Non-medical: Not on file Tobacco Use  Smoking status: Former Smoker Last attempt to quit: 2017 Years since quittin.3  Smokeless tobacco: Never Used Substance and Sexual Activity  Alcohol use: No  
 Drug use: No  
 Sexual activity: Not on file Lifestyle  Physical activity:  
  Days per week: Not on file Minutes per session: Not on file  Stress: Not on file Relationships  Social connections:  
  Talks on phone: Not on file Gets together: Not on file Attends Christian service: Not on file Active member of club or organization: Not on file Attends meetings of clubs or organizations: Not on file Relationship status: Not on file  Intimate partner violence:  
  Fear of current or ex partner: Not on file Emotionally abused: Not on file Physically abused: Not on file Forced sexual activity: Not on file Other Topics Concern  Not on file Social History Narrative ** Merged History Encounter ** Past Surgical History:  
Procedure Laterality Date  FLEXIBLE SIGMOIDOSCOPY N/A 4/26/2017 FLEXIBLE SIGMOIDOSCOPY  performed by Debra Lynne MD at [facility] . HX CHOLECYSTECTOMY  04/2018  HX GI  04/2017  
 reversal of ileostomy  HX HEENT    
 tonsillectomy  HX HERNIA REPAIR    
 HX KNEE REPLACEMENT N/A   
 right elbow repair  HX KNEE REPLACEMENT Right  HX OTHER SURGICAL  03/2017  
 reversal of ostomy  HX SMALL BOWEL RESECTION    
 ileostomy formation  HX QUENTIN AND BSO  HX TONSILLECTOMY  HX TUBAL LIGATION N/A   
 reopened tubes  HX TUBAL LIGATION    
 HX UROLOGICAL  03/01/2017  
 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   
 
 
Family History:  Non-contributory. PE: 
Visit Vitals /52 Pulse 67 Temp 98.4 °F (36.9 °C) (Oral) Resp 16 Ht 5' 6\" (1.676 m) Wt 210 lb 12.8 oz (95.6 kg) SpO2 98% BMI 34.02 kg/m² A&O X3, NAD, well develop, well nourished Heart: S1-S2, rrr 
Lungs: CTA bilat Abd: soft, nt, nt, + bs in all quadrants Ext:  Pos distal pulses to DP, PT 
 
 
X-ray: left knee shows end stage OA Labs: labs were reviewed and wnl.  ua neg A:  Left  knee end stage OA 
 
P:  At this point we will move forward with surgery. Again, the alternatives, risks, complications, as well as expected outcome were discussed and the patient wishes to proceed with surgery.   Pt has been instructed to stop aspirin, nsaids, rheumatologic medications and blood thinners. They have also been instructed to continue on any heart and bp meds and to take them the morning of surgery with sips of water. The patient will require in-patient admission. Admission as an in-patient is reasonable and necessary due to increased risk of surgery due to the factors indicated as well as the possible need for prolonged in-hospital or skilled post-acute care in order to improve this patient's functional ability. Shin Nguyen

## 2019-04-24 NOTE — PATIENT INSTRUCTIONS
Patient: Morgan Ruffin                MRN: [id number]       SSN: [id number]  YOB: 1950        AGE: 81 y.o. SEX: female  Body mass index is 34.02 kg/m². 04/24/19    DO:  1:  Sit with the leg out straight 5-10 min every hour while awake. Keep the toes pointed       up towards the katie. 2.  Bend your knee 5-10 min every hour. Bend it to the point of pain and hold it for 5-10       Min. 3.  ICE your knee 20 min every hour    DO NOT:    1. Do not place anything under your knee to prop it up  2. Do not sit in the recliner chair.

## 2019-04-28 ENCOUNTER — ANESTHESIA EVENT (OUTPATIENT)
Dept: SURGERY | Age: 69
DRG: 470 | End: 2019-04-28
Payer: MEDICARE

## 2019-04-29 ENCOUNTER — APPOINTMENT (OUTPATIENT)
Dept: GENERAL RADIOLOGY | Age: 69
DRG: 470 | End: 2019-04-29
Attending: ORTHOPAEDIC SURGERY
Payer: MEDICARE

## 2019-04-29 ENCOUNTER — HOSPITAL ENCOUNTER (INPATIENT)
Age: 69
LOS: 1 days | Discharge: HOME HEALTH | DRG: 470 | End: 2019-04-30
Attending: ORTHOPAEDIC SURGERY | Admitting: ORTHOPAEDIC SURGERY
Payer: MEDICARE

## 2019-04-29 ENCOUNTER — ANESTHESIA (OUTPATIENT)
Dept: SURGERY | Age: 69
DRG: 470 | End: 2019-04-29
Payer: MEDICARE

## 2019-04-29 DIAGNOSIS — M17.10 ARTHRITIS OF KNEE: Primary | ICD-10-CM

## 2019-04-29 PROCEDURE — C9290 INJ, BUPIVACAINE LIPOSOME: HCPCS | Performed by: ORTHOPAEDIC SURGERY

## 2019-04-29 PROCEDURE — 74011250636 HC RX REV CODE- 250/636

## 2019-04-29 PROCEDURE — 77030027138 HC INCENT SPIROMETER -A: Performed by: ORTHOPAEDIC SURGERY

## 2019-04-29 PROCEDURE — 77030012890: Performed by: ORTHOPAEDIC SURGERY

## 2019-04-29 PROCEDURE — 74011250636 HC RX REV CODE- 250/636: Performed by: ORTHOPAEDIC SURGERY

## 2019-04-29 PROCEDURE — 65270000029 HC RM PRIVATE

## 2019-04-29 PROCEDURE — 74011000250 HC RX REV CODE- 250: Performed by: ORTHOPAEDIC SURGERY

## 2019-04-29 PROCEDURE — 64450 NJX AA&/STRD OTHER PN/BRANCH: CPT | Performed by: ANESTHESIOLOGY

## 2019-04-29 PROCEDURE — 77030002933 HC SUT MCRYL J&J -A: Performed by: ORTHOPAEDIC SURGERY

## 2019-04-29 PROCEDURE — 77030019605: Performed by: ORTHOPAEDIC SURGERY

## 2019-04-29 PROCEDURE — C1713 ANCHOR/SCREW BN/BN,TIS/BN: HCPCS | Performed by: ORTHOPAEDIC SURGERY

## 2019-04-29 PROCEDURE — 77030018836 HC SOL IRR NACL ICUM -A: Performed by: ORTHOPAEDIC SURGERY

## 2019-04-29 PROCEDURE — 74011250637 HC RX REV CODE- 250/637: Performed by: NURSE ANESTHETIST, CERTIFIED REGISTERED

## 2019-04-29 PROCEDURE — 97116 GAIT TRAINING THERAPY: CPT

## 2019-04-29 PROCEDURE — 97161 PT EVAL LOW COMPLEX 20 MIN: CPT

## 2019-04-29 PROCEDURE — 77030020782 HC GWN BAIR PAWS FLX 3M -B: Performed by: ORTHOPAEDIC SURGERY

## 2019-04-29 PROCEDURE — 0SRD0J9 REPLACEMENT OF LEFT KNEE JOINT WITH SYNTHETIC SUBSTITUTE, CEMENTED, OPEN APPROACH: ICD-10-PCS | Performed by: ORTHOPAEDIC SURGERY

## 2019-04-29 PROCEDURE — 77030012935 HC DRSG AQUACEL BMS -B: Performed by: ORTHOPAEDIC SURGERY

## 2019-04-29 PROCEDURE — 76942 ECHO GUIDE FOR BIOPSY: CPT | Performed by: ANESTHESIOLOGY

## 2019-04-29 PROCEDURE — 76010000131 HC OR TIME 2 TO 2.5 HR: Performed by: ORTHOPAEDIC SURGERY

## 2019-04-29 PROCEDURE — 74011000258 HC RX REV CODE- 258: Performed by: ORTHOPAEDIC SURGERY

## 2019-04-29 PROCEDURE — 73560 X-RAY EXAM OF KNEE 1 OR 2: CPT

## 2019-04-29 PROCEDURE — 74011000250 HC RX REV CODE- 250

## 2019-04-29 PROCEDURE — 77010033678 HC OXYGEN DAILY

## 2019-04-29 PROCEDURE — 77030006835 HC BLD SAW SAG STRY -B: Performed by: ORTHOPAEDIC SURGERY

## 2019-04-29 PROCEDURE — 77030026438 HC STYL ET INTUB CARD -A: Performed by: ANESTHESIOLOGY

## 2019-04-29 PROCEDURE — 77030010785: Performed by: ORTHOPAEDIC SURGERY

## 2019-04-29 PROCEDURE — 74011250637 HC RX REV CODE- 250/637: Performed by: PHYSICIAN ASSISTANT

## 2019-04-29 PROCEDURE — 74011000250 HC RX REV CODE- 250: Performed by: PHYSICIAN ASSISTANT

## 2019-04-29 PROCEDURE — 74011250637 HC RX REV CODE- 250/637: Performed by: ORTHOPAEDIC SURGERY

## 2019-04-29 PROCEDURE — 77030012411 HC DRN WND CARD -A: Performed by: ORTHOPAEDIC SURGERY

## 2019-04-29 PROCEDURE — 74011250636 HC RX REV CODE- 250/636: Performed by: NURSE ANESTHETIST, CERTIFIED REGISTERED

## 2019-04-29 PROCEDURE — 77030018719 HC DRSG PTCH ANTIMIC J&J -A: Performed by: ORTHOPAEDIC SURGERY

## 2019-04-29 PROCEDURE — 77030019557 HC ELECTRD VES SEAL MEDT -F: Performed by: ORTHOPAEDIC SURGERY

## 2019-04-29 PROCEDURE — 77030006812 HC BLD SAW RECIP STRY -B: Performed by: ORTHOPAEDIC SURGERY

## 2019-04-29 PROCEDURE — 77030008462 HC STPLR SKN PROX J&J -A: Performed by: ORTHOPAEDIC SURGERY

## 2019-04-29 PROCEDURE — 76210000017 HC OR PH I REC 1.5 TO 2 HR: Performed by: ORTHOPAEDIC SURGERY

## 2019-04-29 PROCEDURE — 77030003029 HC SUT VCRL J&J -B: Performed by: ORTHOPAEDIC SURGERY

## 2019-04-29 PROCEDURE — 74011000258 HC RX REV CODE- 258: Performed by: PHYSICIAN ASSISTANT

## 2019-04-29 PROCEDURE — C1776 JOINT DEVICE (IMPLANTABLE): HCPCS | Performed by: ORTHOPAEDIC SURGERY

## 2019-04-29 PROCEDURE — 77030032490 HC SLV COMPR SCD KNE COVD -B: Performed by: ORTHOPAEDIC SURGERY

## 2019-04-29 PROCEDURE — 77030031139 HC SUT VCRL2 J&J -A: Performed by: ORTHOPAEDIC SURGERY

## 2019-04-29 PROCEDURE — 76060000035 HC ANESTHESIA 2 TO 2.5 HR: Performed by: ORTHOPAEDIC SURGERY

## 2019-04-29 PROCEDURE — 77030008683 HC TU ET CUF COVD -A: Performed by: ANESTHESIOLOGY

## 2019-04-29 PROCEDURE — 77030000032 HC CUF TRNQT ZIMM -B: Performed by: ORTHOPAEDIC SURGERY

## 2019-04-29 PROCEDURE — 77030003666 HC NDL SPINAL BD -A: Performed by: ORTHOPAEDIC SURGERY

## 2019-04-29 PROCEDURE — 77030013708 HC HNDPC SUC IRR PULS STRY –B: Performed by: ORTHOPAEDIC SURGERY

## 2019-04-29 PROCEDURE — 97530 THERAPEUTIC ACTIVITIES: CPT

## 2019-04-29 PROCEDURE — 74011250636 HC RX REV CODE- 250/636: Performed by: PHYSICIAN ASSISTANT

## 2019-04-29 DEVICE — CEMENT BNE 20ML 41GM FULL DOSE PMMA W/ TOBRA M VISC RADPQ: Type: IMPLANTABLE DEVICE | Site: KNEE | Status: FUNCTIONAL

## 2019-04-29 DEVICE — COMPONENT KNEE CEM X3 TRIATHLON: Type: IMPLANTABLE DEVICE | Site: KNEE | Status: FUNCTIONAL

## 2019-04-29 DEVICE — INSERT TIB SZ 4 THK11MM KNEE X3 TOT STBL BEAR REV TRIATHLON: Type: IMPLANTABLE DEVICE | Site: KNEE | Status: FUNCTIONAL

## 2019-04-29 DEVICE — COMPNT FEM PS CEM TRIATHLN 4 L -- TRIATHLON: Type: IMPLANTABLE DEVICE | Site: KNEE | Status: FUNCTIONAL

## 2019-04-29 DEVICE — BASEPLT TIB UNIV TRIATHLN 4 --: Type: IMPLANTABLE DEVICE | Site: KNEE | Status: FUNCTIONAL

## 2019-04-29 DEVICE — PAT ASYM TRITHLON X3 32X10MM -- TRIATHLON ASYMMETRIC X3: Type: IMPLANTABLE DEVICE | Site: KNEE | Status: FUNCTIONAL

## 2019-04-29 RX ORDER — FLUMAZENIL 0.1 MG/ML
0.2 INJECTION INTRAVENOUS AS NEEDED
Status: DISCONTINUED | OUTPATIENT
Start: 2019-04-29 | End: 2019-04-30 | Stop reason: HOSPADM

## 2019-04-29 RX ORDER — ONDANSETRON 2 MG/ML
4 INJECTION INTRAMUSCULAR; INTRAVENOUS
Status: DISCONTINUED | OUTPATIENT
Start: 2019-04-29 | End: 2019-04-30 | Stop reason: HOSPADM

## 2019-04-29 RX ORDER — ONDANSETRON 2 MG/ML
4 INJECTION INTRAMUSCULAR; INTRAVENOUS ONCE
Status: DISCONTINUED | OUTPATIENT
Start: 2019-04-29 | End: 2019-04-29 | Stop reason: HOSPADM

## 2019-04-29 RX ORDER — ZOLPIDEM TARTRATE 5 MG/1
5 TABLET ORAL
Status: DISCONTINUED | OUTPATIENT
Start: 2019-04-29 | End: 2019-04-30 | Stop reason: HOSPADM

## 2019-04-29 RX ORDER — SODIUM CHLORIDE, SODIUM LACTATE, POTASSIUM CHLORIDE, CALCIUM CHLORIDE 600; 310; 30; 20 MG/100ML; MG/100ML; MG/100ML; MG/100ML
75 INJECTION, SOLUTION INTRAVENOUS CONTINUOUS
Status: DISCONTINUED | OUTPATIENT
Start: 2019-04-29 | End: 2019-04-29 | Stop reason: HOSPADM

## 2019-04-29 RX ORDER — LISINOPRIL 10 MG/1
10 TABLET ORAL DAILY
Status: DISCONTINUED | OUTPATIENT
Start: 2019-04-30 | End: 2019-04-30 | Stop reason: HOSPADM

## 2019-04-29 RX ORDER — WARFARIN 10 MG/1
10 TABLET ORAL ONCE
Status: COMPLETED | OUTPATIENT
Start: 2019-04-29 | End: 2019-04-29

## 2019-04-29 RX ORDER — GLYCOPYRROLATE 0.2 MG/ML
INJECTION INTRAMUSCULAR; INTRAVENOUS AS NEEDED
Status: DISCONTINUED | OUTPATIENT
Start: 2019-04-29 | End: 2019-04-29 | Stop reason: HOSPADM

## 2019-04-29 RX ORDER — SODIUM CHLORIDE 0.9 % (FLUSH) 0.9 %
5-40 SYRINGE (ML) INJECTION AS NEEDED
Status: DISCONTINUED | OUTPATIENT
Start: 2019-04-29 | End: 2019-04-29 | Stop reason: HOSPADM

## 2019-04-29 RX ORDER — NALOXONE HYDROCHLORIDE 0.4 MG/ML
0.4 INJECTION, SOLUTION INTRAMUSCULAR; INTRAVENOUS; SUBCUTANEOUS AS NEEDED
Status: DISCONTINUED | OUTPATIENT
Start: 2019-04-29 | End: 2019-04-30 | Stop reason: HOSPADM

## 2019-04-29 RX ORDER — ROCURONIUM BROMIDE 10 MG/ML
INJECTION, SOLUTION INTRAVENOUS AS NEEDED
Status: DISCONTINUED | OUTPATIENT
Start: 2019-04-29 | End: 2019-04-29 | Stop reason: HOSPADM

## 2019-04-29 RX ORDER — POTASSIUM CHLORIDE 20 MEQ/1
20 TABLET, EXTENDED RELEASE ORAL DAILY
Status: DISCONTINUED | OUTPATIENT
Start: 2019-04-29 | End: 2019-04-30 | Stop reason: HOSPADM

## 2019-04-29 RX ORDER — ASPIRIN 325 MG
325 TABLET, DELAYED RELEASE (ENTERIC COATED) ORAL 2 TIMES DAILY
Status: DISCONTINUED | OUTPATIENT
Start: 2019-04-30 | End: 2019-04-30 | Stop reason: HOSPADM

## 2019-04-29 RX ORDER — HYDROMORPHONE HYDROCHLORIDE 2 MG/ML
0.5 INJECTION, SOLUTION INTRAMUSCULAR; INTRAVENOUS; SUBCUTANEOUS
Status: DISCONTINUED | OUTPATIENT
Start: 2019-04-29 | End: 2019-04-29 | Stop reason: HOSPADM

## 2019-04-29 RX ORDER — CELECOXIB 400 MG/1
400 CAPSULE ORAL ONCE
Status: COMPLETED | OUTPATIENT
Start: 2019-04-29 | End: 2019-04-29

## 2019-04-29 RX ORDER — POLYMYXIN B 500000 [USP'U]/1
INJECTION, POWDER, LYOPHILIZED, FOR SOLUTION INTRAMUSCULAR; INTRATHECAL; INTRAVENOUS; OPHTHALMIC AS NEEDED
Status: DISCONTINUED | OUTPATIENT
Start: 2019-04-29 | End: 2019-04-29 | Stop reason: HOSPADM

## 2019-04-29 RX ORDER — LANOLIN ALCOHOL/MO/W.PET/CERES
1000 CREAM (GRAM) TOPICAL DAILY
Status: DISCONTINUED | OUTPATIENT
Start: 2019-04-30 | End: 2019-04-30 | Stop reason: HOSPADM

## 2019-04-29 RX ORDER — ASCORBIC ACID 250 MG
1000 TABLET ORAL DAILY
Status: DISCONTINUED | OUTPATIENT
Start: 2019-04-30 | End: 2019-04-30 | Stop reason: HOSPADM

## 2019-04-29 RX ORDER — SODIUM CHLORIDE 9 MG/ML
100 INJECTION, SOLUTION INTRAVENOUS CONTINUOUS
Status: DISPENSED | OUTPATIENT
Start: 2019-04-29 | End: 2019-04-30

## 2019-04-29 RX ORDER — SODIUM CHLORIDE 0.9 % (FLUSH) 0.9 %
5-40 SYRINGE (ML) INJECTION EVERY 8 HOURS
Status: DISCONTINUED | OUTPATIENT
Start: 2019-04-29 | End: 2019-04-29 | Stop reason: HOSPADM

## 2019-04-29 RX ORDER — LIDOCAINE HYDROCHLORIDE 20 MG/ML
INJECTION, SOLUTION EPIDURAL; INFILTRATION; INTRACAUDAL; PERINEURAL AS NEEDED
Status: DISCONTINUED | OUTPATIENT
Start: 2019-04-29 | End: 2019-04-29 | Stop reason: HOSPADM

## 2019-04-29 RX ORDER — GLUCOSAMINE/CHONDR SU A SOD 750-600 MG
1 TABLET ORAL DAILY
Status: DISCONTINUED | OUTPATIENT
Start: 2019-04-30 | End: 2019-04-30 | Stop reason: HOSPADM

## 2019-04-29 RX ORDER — PREGABALIN 75 MG/1
75 CAPSULE ORAL ONCE
Status: COMPLETED | OUTPATIENT
Start: 2019-04-29 | End: 2019-04-29

## 2019-04-29 RX ORDER — DEXAMETHASONE SODIUM PHOSPHATE 4 MG/ML
8 INJECTION, SOLUTION INTRA-ARTICULAR; INTRALESIONAL; INTRAMUSCULAR; INTRAVENOUS; SOFT TISSUE
Status: COMPLETED | OUTPATIENT
Start: 2019-04-29 | End: 2019-04-29

## 2019-04-29 RX ORDER — VANCOMYCIN HYDROCHLORIDE 1 G/20ML
INJECTION, POWDER, LYOPHILIZED, FOR SOLUTION INTRAVENOUS AS NEEDED
Status: DISCONTINUED | OUTPATIENT
Start: 2019-04-29 | End: 2019-04-29 | Stop reason: HOSPADM

## 2019-04-29 RX ORDER — FENTANYL CITRATE 50 UG/ML
100 INJECTION, SOLUTION INTRAMUSCULAR; INTRAVENOUS ONCE
Status: COMPLETED | OUTPATIENT
Start: 2019-04-29 | End: 2019-04-29

## 2019-04-29 RX ORDER — ACETAMINOPHEN 500 MG
1000 TABLET ORAL EVERY 6 HOURS
Status: DISCONTINUED | OUTPATIENT
Start: 2019-04-29 | End: 2019-04-30 | Stop reason: HOSPADM

## 2019-04-29 RX ORDER — LANOLIN ALCOHOL/MO/W.PET/CERES
1 CREAM (GRAM) TOPICAL 2 TIMES DAILY WITH MEALS
Status: DISCONTINUED | OUTPATIENT
Start: 2019-04-29 | End: 2019-04-30 | Stop reason: HOSPADM

## 2019-04-29 RX ORDER — ACETAMINOPHEN 500 MG
1000 TABLET ORAL ONCE
Status: COMPLETED | OUTPATIENT
Start: 2019-04-29 | End: 2019-04-29

## 2019-04-29 RX ORDER — ROPIVACAINE HYDROCHLORIDE 2 MG/ML
INJECTION, SOLUTION EPIDURAL; INFILTRATION; PERINEURAL
Status: COMPLETED | OUTPATIENT
Start: 2019-04-29 | End: 2019-04-29

## 2019-04-29 RX ORDER — SODIUM CHLORIDE 0.9 % (FLUSH) 0.9 %
5-40 SYRINGE (ML) INJECTION EVERY 8 HOURS
Status: DISCONTINUED | OUTPATIENT
Start: 2019-04-29 | End: 2019-04-30 | Stop reason: HOSPADM

## 2019-04-29 RX ORDER — MIDAZOLAM HYDROCHLORIDE 1 MG/ML
2 INJECTION, SOLUTION INTRAMUSCULAR; INTRAVENOUS ONCE
Status: COMPLETED | OUTPATIENT
Start: 2019-04-29 | End: 2019-04-29

## 2019-04-29 RX ORDER — ONDANSETRON 2 MG/ML
INJECTION INTRAMUSCULAR; INTRAVENOUS AS NEEDED
Status: DISCONTINUED | OUTPATIENT
Start: 2019-04-29 | End: 2019-04-29 | Stop reason: HOSPADM

## 2019-04-29 RX ORDER — ESMOLOL HYDROCHLORIDE 10 MG/ML
INJECTION INTRAVENOUS AS NEEDED
Status: DISCONTINUED | OUTPATIENT
Start: 2019-04-29 | End: 2019-04-29 | Stop reason: HOSPADM

## 2019-04-29 RX ORDER — PREGABALIN 75 MG/1
75 CAPSULE ORAL 2 TIMES DAILY
Status: DISCONTINUED | OUTPATIENT
Start: 2019-04-29 | End: 2019-04-30 | Stop reason: HOSPADM

## 2019-04-29 RX ORDER — CELECOXIB 100 MG/1
100 CAPSULE ORAL 2 TIMES DAILY
Status: DISCONTINUED | OUTPATIENT
Start: 2019-04-29 | End: 2019-04-30 | Stop reason: HOSPADM

## 2019-04-29 RX ORDER — TALC
250 POWDER (GRAM) TOPICAL DAILY
Status: DISCONTINUED | OUTPATIENT
Start: 2019-04-29 | End: 2019-04-30 | Stop reason: HOSPADM

## 2019-04-29 RX ORDER — LISINOPRIL 20 MG/1
20 TABLET ORAL DAILY
Status: DISCONTINUED | OUTPATIENT
Start: 2019-04-29 | End: 2019-04-29

## 2019-04-29 RX ORDER — PROPOFOL 10 MG/ML
INJECTION, EMULSION INTRAVENOUS AS NEEDED
Status: DISCONTINUED | OUTPATIENT
Start: 2019-04-29 | End: 2019-04-29 | Stop reason: HOSPADM

## 2019-04-29 RX ORDER — LIDOCAINE HYDROCHLORIDE 10 MG/ML
3 INJECTION, SOLUTION EPIDURAL; INFILTRATION; INTRACAUDAL; PERINEURAL ONCE
Status: COMPLETED | OUTPATIENT
Start: 2019-04-29 | End: 2019-04-29

## 2019-04-29 RX ORDER — FENTANYL CITRATE 50 UG/ML
INJECTION, SOLUTION INTRAMUSCULAR; INTRAVENOUS AS NEEDED
Status: DISCONTINUED | OUTPATIENT
Start: 2019-04-29 | End: 2019-04-29 | Stop reason: HOSPADM

## 2019-04-29 RX ORDER — OXYCODONE HYDROCHLORIDE 5 MG/1
10-15 TABLET ORAL
Status: DISCONTINUED | OUTPATIENT
Start: 2019-04-29 | End: 2019-04-30 | Stop reason: HOSPADM

## 2019-04-29 RX ORDER — SUCCINYLCHOLINE CHLORIDE 20 MG/ML
INJECTION INTRAMUSCULAR; INTRAVENOUS AS NEEDED
Status: DISCONTINUED | OUTPATIENT
Start: 2019-04-29 | End: 2019-04-29 | Stop reason: HOSPADM

## 2019-04-29 RX ORDER — FAMOTIDINE 20 MG/1
20 TABLET, FILM COATED ORAL ONCE
Status: COMPLETED | OUTPATIENT
Start: 2019-04-29 | End: 2019-04-29

## 2019-04-29 RX ORDER — NEOSTIGMINE METHYLSULFATE 5 MG/5 ML
SYRINGE (ML) INTRAVENOUS AS NEEDED
Status: DISCONTINUED | OUTPATIENT
Start: 2019-04-29 | End: 2019-04-29 | Stop reason: HOSPADM

## 2019-04-29 RX ORDER — POVIDONE-IODINE 10 %
SOLUTION, NON-ORAL TOPICAL AS NEEDED
Status: DISCONTINUED | OUTPATIENT
Start: 2019-04-29 | End: 2019-04-29 | Stop reason: HOSPADM

## 2019-04-29 RX ORDER — VITAMIN E 268 MG
800 CAPSULE ORAL DAILY
Status: DISCONTINUED | OUTPATIENT
Start: 2019-04-30 | End: 2019-04-30 | Stop reason: HOSPADM

## 2019-04-29 RX ORDER — FUROSEMIDE 20 MG/1
20 TABLET ORAL 2 TIMES DAILY
Status: DISCONTINUED | OUTPATIENT
Start: 2019-04-29 | End: 2019-04-29

## 2019-04-29 RX ORDER — LIDOCAINE HYDROCHLORIDE 10 MG/ML
3 INJECTION INFILTRATION; PERINEURAL ONCE
Status: DISCONTINUED | OUTPATIENT
Start: 2019-04-29 | End: 2019-04-29 | Stop reason: SDUPTHER

## 2019-04-29 RX ORDER — ROPIVACAINE HYDROCHLORIDE 2 MG/ML
30 INJECTION, SOLUTION EPIDURAL; INFILTRATION; PERINEURAL
Status: COMPLETED | OUTPATIENT
Start: 2019-04-29 | End: 2019-04-29

## 2019-04-29 RX ORDER — SODIUM CHLORIDE 0.9 % (FLUSH) 0.9 %
5-40 SYRINGE (ML) INJECTION AS NEEDED
Status: DISCONTINUED | OUTPATIENT
Start: 2019-04-29 | End: 2019-04-30 | Stop reason: HOSPADM

## 2019-04-29 RX ORDER — AMOXICILLIN 250 MG
1 CAPSULE ORAL 2 TIMES DAILY
Status: DISCONTINUED | OUTPATIENT
Start: 2019-04-29 | End: 2019-04-30 | Stop reason: HOSPADM

## 2019-04-29 RX ADMIN — PREGABALIN 75 MG: 75 CAPSULE ORAL at 19:00

## 2019-04-29 RX ADMIN — ROPIVACAINE HYDROCHLORIDE 60 MG: 2 INJECTION, SOLUTION EPIDURAL; INFILTRATION at 07:16

## 2019-04-29 RX ADMIN — FERROUS SULFATE TAB 325 MG (65 MG ELEMENTAL FE) 325 MG: 325 (65 FE) TAB at 16:53

## 2019-04-29 RX ADMIN — SODIUM CHLORIDE, SODIUM LACTATE, POTASSIUM CHLORIDE, AND CALCIUM CHLORIDE 75 ML/HR: 600; 310; 30; 20 INJECTION, SOLUTION INTRAVENOUS at 06:41

## 2019-04-29 RX ADMIN — LIDOCAINE HYDROCHLORIDE 4 ML: 10 INJECTION, SOLUTION EPIDURAL; INFILTRATION; INTRACAUDAL; PERINEURAL at 07:14

## 2019-04-29 RX ADMIN — FENTANYL CITRATE 50 MCG: 50 INJECTION, SOLUTION INTRAMUSCULAR; INTRAVENOUS at 08:03

## 2019-04-29 RX ADMIN — ACETAMINOPHEN 1000 MG: 500 TABLET ORAL at 23:35

## 2019-04-29 RX ADMIN — GLYCOPYRROLATE 0.4 MG: 0.2 INJECTION INTRAMUSCULAR; INTRAVENOUS at 09:11

## 2019-04-29 RX ADMIN — FENTANYL CITRATE 100 MCG: 50 INJECTION, SOLUTION INTRAMUSCULAR; INTRAVENOUS at 07:30

## 2019-04-29 RX ADMIN — MIDAZOLAM HYDROCHLORIDE 2 MG: 2 INJECTION, SOLUTION INTRAMUSCULAR; INTRAVENOUS at 07:12

## 2019-04-29 RX ADMIN — FENTANYL CITRATE 100 MCG: 50 INJECTION INTRAMUSCULAR; INTRAVENOUS at 07:13

## 2019-04-29 RX ADMIN — ONDANSETRON 4 MG: 2 INJECTION INTRAMUSCULAR; INTRAVENOUS at 08:58

## 2019-04-29 RX ADMIN — DEXAMETHASONE SODIUM PHOSPHATE 8 MG: 4 INJECTION, SOLUTION INTRAMUSCULAR; INTRAVENOUS at 07:19

## 2019-04-29 RX ADMIN — FAMOTIDINE 20 MG: 20 TABLET ORAL at 06:46

## 2019-04-29 RX ADMIN — TRANEXAMIC ACID 1 G: 1 INJECTION, SOLUTION INTRAVENOUS at 08:58

## 2019-04-29 RX ADMIN — CELECOXIB 100 MG: 100 CAPSULE ORAL at 18:59

## 2019-04-29 RX ADMIN — HYDROMORPHONE HYDROCHLORIDE 0.5 MG: 2 INJECTION, SOLUTION INTRAMUSCULAR; INTRAVENOUS; SUBCUTANEOUS at 10:05

## 2019-04-29 RX ADMIN — ROCURONIUM BROMIDE 30 MG: 10 INJECTION, SOLUTION INTRAVENOUS at 07:37

## 2019-04-29 RX ADMIN — ONDANSETRON 4 MG: 2 INJECTION INTRAMUSCULAR; INTRAVENOUS at 20:17

## 2019-04-29 RX ADMIN — ACETAMINOPHEN 1000 MG: 500 TABLET ORAL at 18:59

## 2019-04-29 RX ADMIN — FENTANYL CITRATE 50 MCG: 50 INJECTION, SOLUTION INTRAMUSCULAR; INTRAVENOUS at 07:51

## 2019-04-29 RX ADMIN — FENTANYL CITRATE 50 MCG: 50 INJECTION, SOLUTION INTRAMUSCULAR; INTRAVENOUS at 08:01

## 2019-04-29 RX ADMIN — PREGABALIN 75 MG: 75 CAPSULE ORAL at 06:46

## 2019-04-29 RX ADMIN — PROPOFOL 150 MG: 10 INJECTION, EMULSION INTRAVENOUS at 07:31

## 2019-04-29 RX ADMIN — HYDROMORPHONE HYDROCHLORIDE 0.5 MG: 2 INJECTION, SOLUTION INTRAMUSCULAR; INTRAVENOUS; SUBCUTANEOUS at 10:13

## 2019-04-29 RX ADMIN — CLINDAMYCIN 900 MG: 150 INJECTION, SOLUTION INTRAMUSCULAR; INTRAVENOUS at 07:35

## 2019-04-29 RX ADMIN — ROPIVACAINE HYDROCHLORIDE 60 MG: 2 INJECTION, SOLUTION EPIDURAL; INFILTRATION; PERINEURAL at 07:18

## 2019-04-29 RX ADMIN — CELECOXIB 400 MG: 400 CAPSULE ORAL at 06:46

## 2019-04-29 RX ADMIN — Medication 3 MG: at 09:11

## 2019-04-29 RX ADMIN — CLINDAMYCIN 900 MG: 150 INJECTION, SOLUTION INTRAMUSCULAR; INTRAVENOUS at 16:53

## 2019-04-29 RX ADMIN — SENNOSIDES, DOCUSATE SODIUM 1 TABLET: 50; 8.6 TABLET, FILM COATED ORAL at 18:59

## 2019-04-29 RX ADMIN — LIDOCAINE HYDROCHLORIDE 60 MG: 20 INJECTION, SOLUTION EPIDURAL; INFILTRATION; INTRACAUDAL; PERINEURAL at 07:30

## 2019-04-29 RX ADMIN — ACETAMINOPHEN 1000 MG: 500 TABLET ORAL at 06:46

## 2019-04-29 RX ADMIN — SUCCINYLCHOLINE CHLORIDE 120 MG: 20 INJECTION INTRAMUSCULAR; INTRAVENOUS at 07:31

## 2019-04-29 RX ADMIN — WARFARIN SODIUM 10 MG: 10 TABLET ORAL at 06:46

## 2019-04-29 RX ADMIN — ESMOLOL HYDROCHLORIDE 20 MG: 10 INJECTION INTRAVENOUS at 08:04

## 2019-04-29 RX ADMIN — ESMOLOL HYDROCHLORIDE 20 MG: 10 INJECTION INTRAVENOUS at 09:21

## 2019-04-29 RX ADMIN — HYDROMORPHONE HYDROCHLORIDE 0.5 MG: 2 INJECTION, SOLUTION INTRAMUSCULAR; INTRAVENOUS; SUBCUTANEOUS at 10:23

## 2019-04-29 RX ADMIN — OXYCODONE HYDROCHLORIDE 10 MG: 5 TABLET ORAL at 23:35

## 2019-04-29 RX ADMIN — TRANEXAMIC ACID 1 G: 1 INJECTION, SOLUTION INTRAVENOUS at 07:41

## 2019-04-29 RX ADMIN — SODIUM CHLORIDE 100 ML/HR: 900 INJECTION, SOLUTION INTRAVENOUS at 11:38

## 2019-04-29 NOTE — ANESTHESIA PROCEDURE NOTES
Peripheral Block    Start time: 4/29/2019 7:12 AM  End time: 4/29/2019 7:20 AM  Performed by: Fidelina Moore MD  Authorized by: Fidelina Moore MD       Pre-procedure: Indications: at surgeon's request and post-op pain management    Preanesthetic Checklist: patient identified, risks and benefits discussed, site marked, timeout performed, anesthesia consent given and patient being monitored    Timeout Time: 07:12          Block Type:   Block Type:   Adductor canal  Monitoring:  Standard ASA monitoring and oxygen  Injection Technique:  Single shot  Procedures: ultrasound guided    Patient Position: supine  Prep: chlorhexidine    Location:  Lower thigh  Needle Type:  Ultraplex  Needle Gauge:  21 G  Needle Localization:  Ultrasound guidance    Assessment:  Number of attempts:  1  Injection Assessment:  Incremental injection every 5 mL, local visualized surrounding nerve on ultrasound, negative aspiration for CSF, negative aspiration for blood, no paresthesia, no intravascular symptoms and ultrasound image on chart  Patient tolerance:  Patient tolerated the procedure well with no immediate complications

## 2019-04-29 NOTE — CONSULTS
Consult  Moira Alfaro MD    Reason for consult: post-operative medical management    Subjective:     Tara Holt is a 81 y.o.  female who is being seen for post-operative medical after left TKA. She is sitting up in a chair. Denies for knee pain. No chest or abdominal pain. No SOB or cough. No nausea or vomiting. Denies for dysuria. No dizziness. States she take Lisinopril, Klor-Con, Mobic and vitamins at home. Lives with family. Family history is negative for stroke or heart issues. Denies for smoking and drinking alcohol. States she just walked with the help of PT.      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)     RLQ - leakage from ileostomy    Arthritis     BMI 31.0-31.9,adult     31.8    Chronic pain     right leg pain; spine and hip down to ankle    Decreased exercise tolerance     mets <4 (SOB)    Depression     Diverticulitis     Fistula of enterostomy (HCC)     Hypertension     Incisional hernia     Sciatic pain     SOB (shortness of breath)       Past Surgical History:   Procedure Laterality Date    FLEXIBLE SIGMOIDOSCOPY N/A 4/26/2017    FLEXIBLE SIGMOIDOSCOPY  performed by Vijay Webber MD at [address] HX CHOLECYSTECTOMY  04/2018    HX GI  04/2017    reversal of ileostomy    HX HEENT      tonsillectomy    HX HERNIA REPAIR      HX KNEE REPLACEMENT N/A     right elbow repair    HX KNEE REPLACEMENT Right     HX OTHER SURGICAL  03/2017    reversal of ostomy     HX SMALL BOWEL RESECTION      ileostomy formation    HX QUENTIN AND BSO      HX TONSILLECTOMY      HX TUBAL LIGATION N/A     reopened tubes    HX TUBAL LIGATION      HX UROLOGICAL  03/01/2017    s/p Placement of bilateral double-J stents - 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      Family History   Problem Relation Age of Onset    Heart Disease Mother     Heart Attack Mother     Other Mother     No Known Problems Other     Other Sister         hip dysplasia    Other Daughter         hip dysplasia      Social History     Tobacco Use    Smoking status: Former Smoker     Last attempt to quit: 2017     Years since quittin.3    Smokeless tobacco: Never Used   Substance Use Topics    Alcohol use: No       Current Facility-Administered Medications   Medication Dose Route Frequency Provider Last Rate Last Dose    magnesium oxide tablet 250 mg  250 mg Oral DAILY Jennifer Cortez MD   Stopped at 19 1100    potassium chloride (K-DUR, KLOR-CON) SR tablet 20 mEq  20 mEq Oral DAILY Jennifer Cortez MD   Stopped at 19 1100    0.9% sodium chloride infusion  100 mL/hr IntraVENous CONTINUOUS Jennifer Cortez  mL/hr at 19 1138 100 mL/hr at 19 1138    sodium chloride (NS) flush 5-40 mL  5-40 mL IntraVENous Q8H Jennifer Cortez MD        sodium chloride (NS) flush 5-40 mL  5-40 mL IntraVENous PRN Jennifer Cortez MD        ferrous sulfate tablet 325 mg  1 Tab Oral BID WITH MEALS Luis Schwab MD        zolpidem (AMBIEN) tablet 5 mg  5 mg Oral QHS PRN Jennifer Cortez MD        acetaminophen (TYLENOL) tablet 1,000 mg  1,000 mg Oral Q6H Jennifer Cortez MD   Stopped at 19 1200    oxyCODONE IR (ROXICODONE) tablet 10-15 mg  10-15 mg Oral Q3H PRN Jennifer Cortez MD        celecoxib (CELEBREX) capsule 100 mg  100 mg Oral BID Jennifer Cortez MD   Stopped at 19 1100    naloxone (NARCAN) injection 0.4 mg  0.4 mg IntraVENous PRN Jennifer Cortez MD        flumazenil (ROMAZICON) 0.1 mg/mL injection 0.2 mg  0.2 mg IntraVENous PRN Jennifer Cortez MD        [START ON 2019] aspirin delayed-release tablet 325 mg  325 mg Oral BID Jennifer Cortez MD        clindamycin phosphate (CLEOCIN) 900 mg in 0.9% sodium chloride 100 mL IVPB  900 mg IntraVENous Q8H Jennifer Cortez MD        [facility]etron [facility]) injection 4 mg  4 mg IntraVENous Q4H PRN MD Diana Lopez senna-docusate (PERICOLACE) 8.6-50 mg per tablet 1 Tab  1 Tab Oral BID Eligio Gomez MD   Stopped at 04/29/19 1100    pregabalin (LYRICA) capsule 75 mg  75 mg Oral BID Eligio Gomez MD        [START ON 4/30/2019] lisinopril (PRINIVIL, ZESTRIL) tablet 10 mg  10 mg Oral DAILY Oscar Arora MD        [START ON 4/30/2019] ascorbic acid (vitamin C) (VITAMIN C) tablet 1,000 mg  1,000 mg Oral DAILY Oscar Arora MD        . PHARMACY TO SUBSTITUTE PER PROTOCOL (Reordered from: Biotin 2,500 mcg cap)    Per Protocol Oscar Arora MD        [START ON 4/30/2019] cyanocobalamin tablet 1,000 mcg  1,000 mcg Oral DAILY Oscar Arora MD       [facility] [START ON 4/30/2019] vitamin E (AQUA GEMS) capsule 800 Units  800 Units Oral DAILY Oscar Arora MD            Allergies   Allergen Reactions    Ancef [Cefazolin] Itching and Hives    Cefazolin Hives        Review of Systems:  A comprehensive review of systems was negative except for that written in the History of Present Illness. Objective: Intake and Output:    04/29 0701 - 04/29 1900  In: 1815 [P.O.:240; I.V.:1000]  Out: 50   No intake/output data recorded. Physical Exam:   Visit Vitals  /71 (BP 1 Location: Left arm, BP Patient Position: At rest)   Pulse 73   Temp 98.4 °F (36.9 °C)   Resp 18   Ht 5' 5\" (1.651 m)   Wt 95.7 kg (211 lb)   SpO2 99%   BMI 35.11 kg/m²     General appearance: alert, cooperative, no distress, appears stated age  Head: Normocephalic, without obvious abnormality, atraumatic  Eyes: negative findings: anicteric sclera  Neck: supple, symmetrical, trachea midline and no JVD  Lungs: clear to auscultation bilaterally  Heart: S1, S2 normal  Abdomen: soft, non-tender. Bowel sounds normal. No masses,  no organomegaly  Extremities: edema and left knee dressing in situ  Neurologic: moves all extremities well       Data Review:   No results found for this or any previous visit (from the past 24 hour(s)).       Assessment: Active Problems:    Arthritis of knee (4/29/2019)      1. Left knee OA s/p TKA  2.  HTN  3. H/o hypokalemia       Plan:     Cont current management  D/c oxygen and monitor - d/w nursing  Reduce lisinopril and monitor  Check potassium level in am  Full code

## 2019-04-29 NOTE — BRIEF OP NOTE
BRIEF OPERATIVE NOTE    Date of Procedure: 4/29/2019   Preoperative Diagnosis: Osteoarthritis of left knee, unspecified osteoarthritis type [M17.12]  Postoperative Diagnosis: Osteoarthritis of left knee, unspecified osteoarthritis type [M17.12]    Procedure(s):  LEFT TOTAL KNEE ARTHROPLASTY  Surgeon(s) and Role:     * Yesenia Burnett MD - Primary         Surgical Assistant: Lorne Pradhan    Surgical Staff:  Circ-1: Harjeet Haines  Physician Assistant: Amarilys Jaimes PA-C  Scrub Tech-1: Jamal Rodgers  Surg Asst-1: Marly Morales  Event Time In Time Out   Incision Start 5085    Incision Close       Anesthesia: General   Estimated Blood Loss: 50ml  Specimens: * No specimens in log *   Findings: same   Complications: none  Implants:   Implant Name Type Inv.  Item Serial No.  Lot No. LRB No. Used Action   CEMENT BNE SIMPLEX TOBRA 4 --  - [id number]  CEMENT BNE SIMPLEX TOBRA 4 --   [employer] HOW [id number] Left 1 Implanted   CEMENT BNE SIMPLEX TOBRA 4 --  - [id number]  CEMENT BNE SIMPLEX TOBRA 4 --   [employer] HOW [id number] Left 1 Implanted   PAT ASYM TRITHLON X3 [id number] -- TRIATHLON ASYMMETRIC X3 - [id number]  PAT ASYM TRITHLON X3 [id number] -- TRIATHLON ASYMMETRIC X3  [employer] HOW [id number] Left 1 Implanted   BASEPLT TIB UNIV TRIATHLN 4 --  - [id number]  BASEPLT TIB UNIV TRIATHLN 4 --   [employer] HOW [id number] Left 1 Implanted   COMPNT FEM PS SABINO TRIATHLN 4 L -- TRIATHLON - [id number]  COMPNT FEM PS SABINO TRIATHLN 4 L -- TRIATHLON  [employer] HOW [id number] Left 1 Implanted   INSERT TIB TS TRIATH X3 [id number] --  - [id number]  INSERT TIB TS TRIATH X3 [id number] --   [employer] HOW [id number] Left 1 Implanted

## 2019-04-29 NOTE — PROGRESS NOTES
Problem: Mobility Impaired (Adult and Pediatric)  Goal: *Acute Goals and Plan of Care (Insert Text)  Description  Physical Therapy Goals  Initiated 4/29/2019 and to be accomplished within 7 day(s)  1. Patient will move from supine to sit and sit to supine , scoot up and down and roll side to side in bed with modified independence. 2.  Patient will transfer from bed to chair and chair to bed with modified independence using the least restrictive device. 3.  Patient will perform sit to stand with modified independence. 4.  Patient will ambulate with supervision/set-up for 250 feet with the least restrictive device. 5.  Patient will ascend/descend 3 stairs with 0 handrail(s) and 5-10 steps with 1 handrail with minimal assistance/contact guard assist.     Prior Level of Function: Pt was independent with mobility including gait no AD. She lives with her  in a 2 story home with bedroom on 2nd floor. Pt has 3 steps to enter with no handrails but has 1 handrail inside the house. Pt has a cane, RW, grab bars in shower, BSC, and SC at home. Outcome: Progressing Towards Goal   PHYSICAL THERAPY EVALUATION    Patient: Edgar Pena (45 y.o. female)  Date: 4/29/2019  Primary Diagnosis: Osteoarthritis of left knee, unspecified osteoarthritis type [M17.12]  Arthritis of knee [M17.10]  Procedure(s) (LRB):  LEFT TOTAL KNEE ARTHROPLASTY (Left) Day of Surgery   Precautions:   Fall, WBAT  Weight Bearing: WBAT    ASSESSMENT :  PT orders received and patient cleared by nursing to participate with therapy. Patient is a 81 y.o. female admitted to the hospital due to s/p L total knee arthroplasty Dr. Shefali Lambert 4/29/19. Patient consents to PT evaluation and treatment. Pt educated on safety, mobility, therex, towel under ankle only, and OOB 3-5 times with nursing assistance. Pt performed supine to sit minimal assistance. Sit to stands minimal assistance.  Gait 25 feet with RW contact guard assistance for balance and safety. Pt needed increased time with all activities especially during gait. She needed cues for sequencing and safety with transfers and gait. Pt ended therapy sitting in recliner with towel roll under ankle, ice donned, and all needs met. Patient will benefit from skilled intervention to address the above impairments. Patient's rehabilitation potential is considered to be Good  Factors which may influence rehabilitation potential include:    ? None noted  ? Mental ability/status  ? Medical condition  ? Home/family situation and support systems  ? Safety awareness  ? Pain tolerance/management  ? Other:      PLAN :  Recommendations and Planned Interventions:    ?           Bed Mobility Training             ? Neuromuscular Re-Education  ? Transfer Training                   ? Orthotic/Prosthetic Training  ? Gait Training                          ? Modalities  ? Therapeutic Exercises           ? Edema Management/Control  ? Therapeutic Activities            ? Family Training/Education  ? Patient Education  ? Other (comment):    Frequency/Duration: Patient will be followed by physical therapy 1-2 times per day to address goals. Discharge Recommendations: Home Health  Further Equipment Recommendations for Discharge: N/A     SUBJECTIVE:   Patient stated ?an a**hole (referring to her  she lives with). ? \"he's a beast (talking about her 's dog, a beagle)\"  \"I talk ill about my  sometimes\"    OBJECTIVE DATA SUMMARY:     Past Medical History:   Diagnosis Date    [facility]     RLQ - leakage from ileostomy    Arthritis     BMI 31.0-31.9,adult     31.8    Chronic pain     right leg pain; spine and hip down to ankle    Decreased exercise tolerance     mets <4 (SOB)    Depression     Diverticulitis     Fistula of enterostomy ([facility]     Hypertension     Incisional hernia     Sciatic pain     SOB (shortness of breath)      Past Surgical History:   Procedure Laterality Date    FLEXIBLE SIGMOIDOSCOPY N/A 4/26/2017    FLEXIBLE SIGMOIDOSCOPY  performed by Valeriy Wong MD at [address] [place] [address]    HX CHOLECYSTECTOMY  04/2018    HX GI  04/2017    reversal of ileostomy    HX HEENT      tonsillectomy    HX HERNIA REPAIR      HX KNEE REPLACEMENT N/A     right elbow repair    HX KNEE REPLACEMENT Right     HX OTHER SURGICAL  03/2017    reversal of ostomy     HX SMALL BOWEL RESECTION      ileostomy formation    HX QUENTIN AND BSO      HX TONSILLECTOMY      HX TUBAL LIGATION N/A     reopened tubes    HX TUBAL LIGATION      HX UROLOGICAL  03/01/2017    s/p Placement of bilateral double-J stents - 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      Barriers to Learning/Limitations: yes;  altered mental status (i.e.Sedation, Confusion)  Compensate with: Visual Cues, Verbal Cues and Tactile Cues  Home Situation:  Home Situation  Home Environment: Private residence  # Steps to Enter: 3  Rails to Enter: No  One/Two Story Residence: Two story  # of Interior Steps: 15  Interior Rails: Left  Living Alone: No  Support Systems: Spouse/Significant Other/Partner  Patient Expects to be Discharged to[de-identified] Private residence  Current DME Used/Available at Home: Alana Moulton, batsheva, Walker, rolling, Commode, bedside, Grab bars, Shower chair  Critical Behavior:  Neurologic State: Alert;Drowsy  Orientation Level: Oriented X4  Cognition: Follows commands;Decreased attention/concentration  Safety/Judgement: Fall prevention  Psychosocial  Patient Behaviors: Calm; Cooperative  Family  Behaviors: Cooperative;Calm  Skin Condition/Temp: Dry;Warm  Family  Behaviors: Cooperative;Calm  Skin Integrity: Incision (comment)(knee)  Skin Integumentary  Skin Color: Appropriate for ethnicity  Skin Condition/Temp: Dry;Warm  Skin Integrity: Incision (comment)(knee)  Turgor: Non-tenting  Hair Growth: Present  Varicosities: Absent     B LE Strength:    Strength: (R 5/5 L 3 to 3+/5)              B LE Tone & Sensation:   Tone: Normal          Sensation: Intact           B LE Range Of Motion:  AROM: (WFL except L knee 5-88 degrees)                 Functional Mobility:  Bed Mobility:     Supine to Sit: Minimum assistance        Transfers:  Sit to Stand: Minimum assistance                           Balance:   Sitting: Intact  Standing: Impaired; With support  Standing - Static: Fair  Standing - Dynamic : Fair  Ambulation/Gait Training:  Distance (ft): 25 Feet (ft)  Assistive Device: Walker, rolling  Ambulation - Level of Assistance: Contact guard assistance  Gait Description (WDL): Exceptions to WDL  Gait Abnormalities: Antalgic;Decreased step clearance  Left Side Weight Bearing: As tolerated  Base of Support: Center of gravity altered  Speed/Temi: Pace decreased (<100 feet/min)  Step Length: Right shortened;Left shortened    Therapeutic Exercises:   Reviewed and performed ankle pumps, SLR, quad sets, and heel slides. Pain:  Pain level pre-treatment: 7/10 L knee  Pain level post-treatment: 7/10 L knee  Pain Intervention(s) : Medication (see MAR); Rest, Ice, Repositioning  Response to intervention: Nurse notified, See doc flow    Activity Tolerance:   fair  Please refer to the flowsheet for vital signs taken during this treatment. After treatment:   ?         Patient left in no apparent distress sitting up in chair  ? Patient left in no apparent distress in bed  ? Call bell left within reach  ? Personal items in reach   ? Nursing notified [facility]  ? Caregiver present  ? Bed/chair alarm activated  ? SCDs applied     COMMUNICATION/EDUCATION:   ?         Role of Physical Therapy in the acute care setting. ?         Fall prevention education was provided and the patient/caregiver indicated understanding. ?          Patient/family have participated as able in goal setting and plan of care. ?         Patient/family agree to work toward stated goals and plan of care. ?         Patient understands intent and goals of therapy, but is neutral about his/her participation. ? Patient is unable to participate in goal setting/plan of care: ongoing with therapy staff. ?         Out of bed with nursing assistance 3-5 times a day. ? Other:     Thank you for this referral.  Radha File, PT, DPT   Time Calculation: 38 mins      Eval Complexity: History: LOW Complexity : Zero comorbidities / personal factors that will impact the outcome / POCExam:HIGH Complexity : 4+ Standardized tests and measures addressing body structure, function, activity limitation and / or participation in recreation  Presentation: LOW Complexity : Stable, uncomplicated  Clinical Decision Making:Low Complexity    Overall Complexity:LOW

## 2019-04-29 NOTE — PROGRESS NOTES
Patient arrived from PACU. Alert and oriented. Vitals stable. No signs of distress noted. Call bell in place. Daughter at bedside.

## 2019-04-29 NOTE — PROGRESS NOTES
Bedside and Verbal shift change report given to DRAKE Chu (oncoming nurse) by Ruperto Driver RN (offgoing nurse). Report included the following information SBAR and Kardex.

## 2019-04-29 NOTE — PROGRESS NOTES
Reason for Admission:  Osteoarthritis of left knee, unspecified osteoarthritis type [M17.12]  Arthritis of knee [M17.10]                 RRAT Score:    12           Plan for utilizing home health:   [facility]                    Likelihood of Readmission:   Moderate                         Do you (patient/family) have any concerns for transition/discharge? Patient has no concerns regarding this discharge. Transition of Care Plan:   Patient will return home with help from her family. Patient's daughter will transport home at the time of discharge. Initial assessment completed with patient. Cognitive status of patient: Alert and oriented. Face sheet information confirmed:  yes. The patient designates her daughter Helen Thrasher to participate in her discharge plan and to receive any needed information. This patient lives in a house with her . Patient is able to navigate steps as needed. Prior to hospitalization, patient was considered to be independent with ADLs/IADLS : yes . Patient has a current ACP document on file: no     Patient's daughter Helen Smith) will be available to transport patient home upon discharge. The patient already has 2 walkers, a cane, a shower chair and a raised toilet seat available in the home. Patient is not currently active with home health. Patient has not stayed in a skilled nursing facility or rehab. List of available Home Health agencies were provided and reviewed with the patient prior to discharge. Freedom of choice signed: yes, for [facility]. Currently, the discharge plan is to return home with help from her family. Patient's daughter will transport home at the time of discharge. Patient's daughter is Amara BrooksRAVIN#[phone]). Patient's PCP is [facility] Patient is insured through [employer].     The patient states that she can obtain her medications from the pharmacy, and take her medications as directed. This writer will continue to closely monitor for discharge planning to ensure a safe discharge home from [facility]. Care Management Interventions  PCP Verified by CM: Yes(Sherrell Chaidez)  Palliative Care Criteria Met (RRAT>21 & CHF Dx)?: No  Mode of Transport at Discharge: Other (see comment)(Family will transport)  Transition of Care Consult (CM Consult): Discharge Planning, [address]: No  Reason Outside [place]: Out of service area  Current Support Network: Lives with Spouse, Family Lives Nearby  Confirm Follow Up Transport: Family  Plan discussed with Pt/Family/Caregiver: Yes  Freedom of Choice Offered: Yes  Discharge Location  Discharge Placement: Home with home health([facility])        Gilson Dawkins MSW  Care Manager  Pager#: [phone]

## 2019-04-29 NOTE — ANESTHESIA PREPROCEDURE EVALUATION
Relevant Problems No relevant active problems Anesthetic History No history of anesthetic complications Review of Systems / Medical History Patient summary reviewed and pertinent labs reviewed Pulmonary Neuro/Psych Within defined limits Cardiovascular Hypertension Exercise tolerance: >4 METS 
  
GI/Hepatic/Renal 
Within defined limits Endo/Other Morbid obesity Other Findings Comments:  
 
 
  
 
 
 
Physical Exam 
 
Airway Mallampati: II 
TM Distance: 4 - 6 cm Neck ROM: normal range of motion Mouth opening: Normal 
 
 Cardiovascular Regular rate and rhythm,  S1 and S2 normal,  no murmur, click, rub, or gallop Rhythm: regular Rate: normal 
 
 
 
 Dental 
 
Dentition: Poor dentition Comments: The patient has very poor dentition. Loose, broken, and or missing teeth. I explained the risk of dental damage and or loss. The patient understands and accepts these risks. Pulmonary Breath sounds clear to auscultation Abdominal 
GI exam deferred Other Findings Anesthetic Plan ASA: 3 Anesthesia type: general and regional 
 
 
 
 
Induction: Intravenous Anesthetic plan and risks discussed with: Patient

## 2019-04-29 NOTE — ANESTHESIA POSTPROCEDURE EVALUATION
Procedure(s): LEFT TOTAL KNEE ARTHROPLASTY. general 
 
Anesthesia Post Evaluation Multimodal analgesia: multimodal analgesia used between 6 hours prior to anesthesia start to PACU discharge Patient location during evaluation: bedside Patient participation: complete - patient participated Level of consciousness: awake Pain management: adequate Airway patency: patent Anesthetic complications: no 
Cardiovascular status: stable Respiratory status: acceptable Hydration status: acceptable Post anesthesia nausea and vomiting:  controlled Vitals Value Taken Time /58 4/29/2019 10:52 AM  
Temp 36.3 °C (97.3 °F) 4/29/2019 10:25 AM  
Pulse 65 4/29/2019 11:03 AM  
Resp 9 4/29/2019 11:03 AM  
SpO2 95 % 4/29/2019 11:03 AM  
Vitals shown include unvalidated device data.

## 2019-04-29 NOTE — INTERVAL H&P NOTE
H&P Update:  Suman VASQUEZ Yolanda Schwartz was seen and examined. History and physical has been reviewed. The patient has been examined.  There have been no significant clinical changes since the completion of the originally dated History and Physical.

## 2019-04-29 NOTE — PROGRESS NOTES
conducted a pre-surgery visit with [place], who is a 81 y.o.,female. The  provided the following Interventions:  Initiated a relationship of care and support. Offered prayer and assurance of continued prayers on patient's behalf. Plan:  Chaplains will continue to follow and will provide pastoral care on an as needed/requested basis.  recommends bedside caregivers page  on duty if patient shows signs of acute spiritual or emotional distress.     [address] [facility] Certified [address] [place] [address]   [phone]

## 2019-04-29 NOTE — PERIOP NOTES
TRANSFER - OUT REPORT:    Verbal report given to Jayashree TAYLOR RN(name) on Elina Cortez  being transferred to [address] [place] [address](unit) for routine post - op       Report consisted of patients Situation, Background, Assessment and   Recommendations(SBAR). Information from the following report(s) SBAR, Kardex, OR Summary, Procedure Summary, Intake/Output, MAR, Recent Results and Med Rec Status was reviewed with the receiving nurse. Lines:   Peripheral IV 04/29/19 Right Arm (Active)   Site Assessment Clean, dry, & intact 4/29/2019  9:32 AM   Phlebitis Assessment 0 4/29/2019  9:32 AM   Infiltration Assessment 0 4/29/2019  9:32 AM   Dressing Status Clean, dry, & intact 4/29/2019  9:32 AM   Dressing Type Tape;Transparent 4/29/2019  9:32 AM   Hub Color/Line Status Pink; Infusing 4/29/2019  9:32 AM        Opportunity for questions and clarification was provided.       Patient transported with:   O2 @ 2 liters  Tech

## 2019-04-30 VITALS
HEART RATE: 82 BPM | RESPIRATION RATE: 16 BRPM | DIASTOLIC BLOOD PRESSURE: 52 MMHG | SYSTOLIC BLOOD PRESSURE: 115 MMHG | HEIGHT: 65 IN | BODY MASS INDEX: 35.16 KG/M2 | WEIGHT: 211 LBS | TEMPERATURE: 98.2 F | OXYGEN SATURATION: 98 %

## 2019-04-30 LAB — POTASSIUM SERPL-SCNC: 4.2 MMOL/L (ref 3.5–5.5)

## 2019-04-30 PROCEDURE — 84132 ASSAY OF SERUM POTASSIUM: CPT

## 2019-04-30 PROCEDURE — 97535 SELF CARE MNGMENT TRAINING: CPT

## 2019-04-30 PROCEDURE — 97116 GAIT TRAINING THERAPY: CPT

## 2019-04-30 PROCEDURE — 74011000250 HC RX REV CODE- 250: Performed by: ORTHOPAEDIC SURGERY

## 2019-04-30 PROCEDURE — 36415 COLL VENOUS BLD VENIPUNCTURE: CPT

## 2019-04-30 PROCEDURE — 97110 THERAPEUTIC EXERCISES: CPT

## 2019-04-30 PROCEDURE — 74011000258 HC RX REV CODE- 258: Performed by: ORTHOPAEDIC SURGERY

## 2019-04-30 PROCEDURE — 74011250637 HC RX REV CODE- 250/637: Performed by: INTERNAL MEDICINE

## 2019-04-30 PROCEDURE — 74011250637 HC RX REV CODE- 250/637: Performed by: ORTHOPAEDIC SURGERY

## 2019-04-30 PROCEDURE — 97165 OT EVAL LOW COMPLEX 30 MIN: CPT

## 2019-04-30 RX ORDER — ASPIRIN 325 MG
325 TABLET, DELAYED RELEASE (ENTERIC COATED) ORAL 2 TIMES DAILY
Qty: 60 TAB | Refills: 1 | Status: SHIPPED | OUTPATIENT
Start: 2019-04-30

## 2019-04-30 RX ORDER — LANOLIN ALCOHOL/MO/W.PET/CERES
325 CREAM (GRAM) TOPICAL 2 TIMES DAILY WITH MEALS
Qty: 60 TAB | Refills: 1 | Status: SHIPPED | OUTPATIENT
Start: 2019-04-30

## 2019-04-30 RX ORDER — CELECOXIB 100 MG/1
100 CAPSULE ORAL 2 TIMES DAILY
Qty: 60 CAP | Refills: 2 | Status: SHIPPED | OUTPATIENT
Start: 2019-04-30 | End: 2019-07-29

## 2019-04-30 RX ORDER — DOCUSATE SODIUM 100 MG/1
100 CAPSULE, LIQUID FILLED ORAL 2 TIMES DAILY
Qty: 60 CAP | Refills: 2 | Status: SHIPPED | OUTPATIENT
Start: 2019-04-30 | End: 2019-07-29

## 2019-04-30 RX ORDER — OXYCODONE AND ACETAMINOPHEN 7.5; 325 MG/1; MG/1
1-2 TABLET ORAL
Qty: 56 TAB | Refills: 0 | Status: SHIPPED | OUTPATIENT
Start: 2019-04-30 | End: 2019-05-07

## 2019-04-30 RX ADMIN — OXYCODONE HYDROCHLORIDE 10 MG: 5 TABLET ORAL at 11:46

## 2019-04-30 RX ADMIN — Medication 250 MG: at 08:17

## 2019-04-30 RX ADMIN — PREGABALIN 75 MG: 75 CAPSULE ORAL at 08:17

## 2019-04-30 RX ADMIN — CELECOXIB 100 MG: 100 CAPSULE ORAL at 08:18

## 2019-04-30 RX ADMIN — LISINOPRIL 10 MG: 10 TABLET ORAL at 08:26

## 2019-04-30 RX ADMIN — POTASSIUM CHLORIDE 20 MEQ: 20 TABLET, EXTENDED RELEASE ORAL at 08:18

## 2019-04-30 RX ADMIN — OXYCODONE HYDROCHLORIDE 10 MG: 5 TABLET ORAL at 08:17

## 2019-04-30 RX ADMIN — ACETAMINOPHEN 1000 MG: 500 TABLET ORAL at 05:10

## 2019-04-30 RX ADMIN — CYANOCOBALAMIN TAB 1000 MCG 1000 MCG: 1000 TAB at 08:26

## 2019-04-30 RX ADMIN — CLINDAMYCIN 900 MG: 150 INJECTION, SOLUTION INTRAMUSCULAR; INTRAVENOUS at 00:42

## 2019-04-30 RX ADMIN — ACETAMINOPHEN 1000 MG: 500 TABLET ORAL at 11:46

## 2019-04-30 RX ADMIN — Medication 10 ML: at 05:10

## 2019-04-30 RX ADMIN — ASCORBIC ACID TAB 250 MG 1000 MG: 250 TAB at 08:26

## 2019-04-30 RX ADMIN — ASPIRIN 325 MG: 325 TABLET, COATED ORAL at 08:26

## 2019-04-30 RX ADMIN — OXYCODONE HYDROCHLORIDE 10 MG: 5 TABLET ORAL at 05:10

## 2019-04-30 RX ADMIN — SENNOSIDES, DOCUSATE SODIUM 1 TABLET: 50; 8.6 TABLET, FILM COATED ORAL at 08:17

## 2019-04-30 NOTE — PROGRESS NOTES
Problem: Mobility Impaired (Adult and Pediatric)  Goal: *Acute Goals and Plan of Care (Insert Text)  Description  Physical Therapy Goals  Initiated 4/29/2019 and to be accomplished within 7 day(s)  1. Patient will move from supine to sit and sit to supine , scoot up and down and roll side to side in bed with modified independence. 2.  Patient will transfer from bed to chair and chair to bed with modified independence using the least restrictive device. 3.  Patient will perform sit to stand with modified independence. 4.  Patient will ambulate with supervision/set-up for 250 feet with the least restrictive device. 5.  Patient will ascend/descend 3 stairs with 0 handrail(s) and 5-10 steps with 1 handrail with minimal assistance/contact guard assist.     Prior Level of Function: Pt was independent with mobility including gait no AD. She lives with her  in a 2 story home with bedroom on 2nd floor. Pt has 3 steps to enter with no handrails but has 1 handrail inside the house. Pt has a cane, RW, grab bars in shower, BSC, and SC at home. Outcome: Progressing Towards Goal   PHYSICAL THERAPY TREATMENT    Patient: Rafael Paz (66 y.o. female)  Date: 4/30/2019  Diagnosis: Osteoarthritis of left knee, unspecified osteoarthritis type [M17.12]  Arthritis of knee [M17.10] <principal problem not specified>  Procedure(s) (LRB):  LEFT TOTAL KNEE ARTHROPLASTY (Left) 1 Day Post-Op  Precautions: Fall, WBAT    ASSESSMENT:  Patient found seated postion willing to work with PT. Pt displayed intact SLR voicing post knee pain. Pt able to obtain fair alignment to stand shifting to right, improving w/ cueing. Pt amb from room to brock w/ RW for a total of 300' this visit. Pt able to improve overall gait quality w/ cueing and distance as pt displayed decreased step height/length w/ an antalgic pattern.  Pt also able to safely perform 11 steps this visit req cueing for sequencing using only left hand railing for 1st few steps and using both for remainder to mimic home environment at [facility] level. Pt returned to room to chair, provided further education, performed there-ex (see below) w/ HO provided, and left in room w/ all needs in reach. From a PT standpoint, pt is safe to return home and would benefit from home health therapy. Ext at rest: 10  AROM: 0-98      Progression toward goals: good   ? Improving appropriately and progressing toward goals  ? Improving slowly and progressing toward goals  ? Not making progress toward goals and plan of care will be adjusted     PLAN:  Patient continues to benefit from skilled intervention to address the above impairments. Continue treatment per established plan of care. Discharge Recommendations:  Home Health  Further Equipment Recommendations for Discharge:  rolling walker     SUBJECTIVE:   Patient stated ? How long do I have to walk with the walker??    OBJECTIVE DATA SUMMARY:   Critical Behavior:  Neurologic State: Alert  Orientation Level: Oriented X4  Cognition: Appropriate decision making  Safety/Judgement: Awareness of environment  Functional Mobility Training:  Transfers:  Sit to Stand: Supervision  Stand to Sit: Supervision  Balance:  Sitting: Intact  Standing: Intact; With support  Standing - Static: Good  Standing - Dynamic : Good   Range Of Motion:   AROM: Within functional limits  Ambulation/Gait Training:  Distance (ft): 300 Feet (ft)  Assistive Device: Walker, rolling  Ambulation - Level of Assistance: Supervision  Gait Abnormalities: Decreased step clearance; Antalgic  Left Side Weight Bearing: As tolerated  Base of Support: Center of gravity altered;Shift to right  Speed/Temi: Pace decreased (<100 feet/min)  Step Length: Right shortened;Left shortened  Stairs:  Number of Stairs Trained: 11  Stairs - Level of Assistance: Contact guard assistance  Rail Use: (right then left )  Therapeutic Exercises: HO provided        EXERCISE   Sets   Reps   Active Active Assist   Passive Self- assited ROM   Comments   Ankle Pumps 1 10 ? ? ? ?    Quad Sets 1 10 ? ? ? ? Seated knee hangs  1 10 ? ? ? ? Short Arc Quads   ? ? ? ? Heel Slides 1 10 ? ? ? ? Straight Leg Raises   ? ? ? ? Hip Abd   ? ? ? ? Long Arc Quads   ? ? ? ? Seated Marching   ? ? ? ? Seated Knee Flexion   ? ? ? ? Standing Marching   ? ? ? ? Pain:  Did not voice number, voiced increased post knee pain    Activity Tolerance:   Good   Please refer to the flowsheet for vital signs taken during this treatment. After treatment:   ? Patient left in no apparent distress sitting up in chair  ? Patient left in no apparent distress in bed  ? Call bell left within reach  ? Nursing notified  ? Caregiver present  ? Bed alarm activated  ? SCDs applied      COMMUNICATION/EDUCATION:   ?         Role of Physical Therapy in the acute care setting. ?         Fall prevention education was provided and the patient/caregiver indicated understanding. ? Patient/family have participated as able in working toward goals and plan of care. ?         Patient/family agree to work toward stated goals and plan of care. ?         Patient understands intent and goals of therapy, but is neutral about his/her participation. ? Patient is unable to participate in stated goals/plan of care: ongoing with therapy staff.   ?         Other:        Aida Arana PTA   Time Calculation: 41 mins

## 2019-04-30 NOTE — ROUTINE PROCESS
Bedside and Verbal shift change report given by Albania Homlan RN (offgoing nurse) to Osman Rodriguez RN (oncoming nurse). Report included the following information SBAR, Kardex and MAR.

## 2019-04-30 NOTE — ROUTINE PROCESS
Patient IV was pulled out by discharge nurse before last dose of antibiotic. 2 attempted to put IV back in patient. Patient stated she did not want IV and do not want the antibiotic. Explain to patient the risk of not finishing the antibiotic. Patient expressed understanding.

## 2019-04-30 NOTE — ROUTINE PROCESS
Discharge instructions reviewed with patient. IV removed, no s/s infection noted. Patient denies any questions. Patient armband removed and shredded    Discharge medications reviewed with patient and appropriate educational materials and side effects teaching were provided.

## 2019-04-30 NOTE — PROGRESS NOTES
Ortho    Pt. Seen and evaluated. Doing well, up with PT, progressing well  Pain well controlled  Denies cp, sob, abd pain    Blood pressure 104/53, pulse 68, temperature 98.3 °F (36.8 °C), resp. rate 15, height 5' 5\" (1.651 m), weight 211 lb (95.7 kg), SpO2 98 %. leftKnee woundclean, dry, no drainage  Sensory intact to LT  Motor intact  nv intact  Neg calf tenderness    Labs:  CBC  @  CBC:   Lab Results   Component Value Date/Time    WBC 9.0 04/17/2019 08:10 AM    RBC 4.30 04/17/2019 08:10 AM    HGB 12.9 04/17/2019 08:10 AM    HCT 40.1 04/17/2019 08:10 AM    PLATELET 622 18/22/4481 08:10 AM     BMP:   Lab Results   Component Value Date/Time    Glucose 112 (H) 04/17/2019 08:10 AM    Sodium 141 04/17/2019 08:10 AM    Potassium 4.2 04/30/2019 04:05 AM    Chloride 107 04/17/2019 08:10 AM    CO2 26 04/17/2019 08:10 AM    BUN 38 (H) 04/17/2019 08:10 AM    Creatinine 2.35 (H) 04/17/2019 08:10 AM    Calcium 10.4 (H) 04/17/2019 08:10 AM   @  Coagulation  Lab Results   Component Value Date    INR 1.0 04/17/2019    APTT 30.9 04/17/2019      Basic Metabolic Profile  Lab Results   Component Value Date     04/17/2019    CO2 26 04/17/2019    BUN 38 (H) 04/17/2019       Assesment: leftOrthopedic / Rheumatologic: Total Knee Replacement  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 Plan: aspirin, PT, DC to home if cleared by PT and ok with medicine.

## 2019-04-30 NOTE — ROUTINE PROCESS
Bedside shift change report given to ELENA (oncoming nurse) by Nidhi Leo (offgoing nurse). Report included the following information SBAR, Kardex, Intake/Output, MAR and Recent Results.

## 2019-04-30 NOTE — PROGRESS NOTES
Elina Lynch Bry Rounded on post total knee replacement. Patient and family educated: Activity:   OOB for all meals,   Walk every hour to prevent blood clots, help move better and lessen stiffness. Bend knee 10 x /hr  Do not put anything under knee. Towel roll under ankle. VTE prophylaxis:   Use SCD pumps except when walking. Ankle pumps 10 times an hour at hospital & home. Take blood thinner medication as ordered by surgeon. Do not skip a dose. Pain Control:  Pain medications side effects discussed. Wean off narcotics ASAP. Use Tylenol ( 3000 mg/24 hours) , ice, distraction, moving, & change position to help with pain. Rest between activity. Don't get nauseated. Eat a snack before taking pain medication    Do not get constipated: take stool softener/mild laxative daily while on narcotics. Incentive Spirometry:    Use of incentive spirometer 10 x/hr. Demonstration  1750 ml x 3  Wound Care: Dressing dry and intact. Do not to take dressing off at home. Bathe daily with dial soap & wear clean clothes/clean towel. No lotions, powders, creams to surgical leg. .    Diet:   Eat for healing. Protein heals bone/muscle. Drink 8 glasses of water a day. Patient Safety:   Call light & belongings in reach. Call for help when want to walk or get OOB. Educational material given. Patient agreed to keep doing everything at home to prevent complications & have a successful recovery. Patient  verbalized understand. Given the opportunity for asking questions.     Mobility Intervention:       [] Pt dangled at edge of bed    [] Pt assisted OOB to bedside commode    [] Pt assisted OOB to chair    [] Pt ambulated to bathroom    [] Patient was ambulated in room/hallway    Assistive Device Utilized:       [] Rolling walker   [] Crutches   [] Straight Cane   [] Knee immobilizer   [] IV pole    After Rounding and Checking on Patient     [x] Patient left in no apparent distress sitting up in chair  [] Patient left in no apparent distress in bed  [x] Call bell left within reach  [x] SCDs on both legs & machine turned on  [x] Ice applied  [] RN notified  [] Caregiver present  [] Bed alarm activated    Reason patient not mobilized:      [] Patient refused   [] Nausea/vomiting   [] Low blood pressure   [] Drowsy/lethargic    Pain Rating:       Left patient with call light, cell phone and personal belongings in reach for safety.

## 2019-04-30 NOTE — PROGRESS NOTES
Discharge:    Discharge order noted for today. Pt has been accepted to [address] [place] [address] agency. Met with patient and she is agreeable to the transition plan today. Transport has been arranged through her family. Patient's discharge summary and home health  orders have been forwarded to [address] via [place]. Updated bedside RN, Tod Desai,  to the transition plan. Discharge information has been documented on the AVS.       Gilson RANDLE  [facility] MSW  Care Manager  Pager#: [phone]

## 2019-04-30 NOTE — ROUTINE PROCESS
Mobility Intervention:  
 
  [] Pt dangled at edge of bed 
  [] Pt assisted OOB to bedside commode 
  [] Pt assisted OOB to chair 
  [] Pt ambulated to bathroom [x] Patient was ambulated in room/hallway Assistive Device Utilized:  
  
 [x] Rolling walker 
 [] Crutches 
 [] Straight Cane 
 [] Knee immobilizer [] IV pole After Mobilization:  
 
[] Patient left in no apparent distress sitting up in chair 
[x] Patient left in no apparent distress in bed 
[] Call bell left within reach 
[] SCDs on & machine turned on 
[] Ice applied 
[] RN notified 
[] Caregiver present 
[] Bed alarm activated Reason patient not mobilized:  
 
 [] Patient refused 
 [] Nausea/vomiting 
 [] Low blood pressure 
 [] Drowsy/lethargic Pain Rating:  
 
[] 0 [] 1 Assistive Device:       
[] 2 [] 3 [] 4 
[] 5 
[] 6 Assistive Device:       
[] 7 
[] 8 
[] 9 [] 10 Comments:

## 2019-04-30 NOTE — PROGRESS NOTES
Problem: Pain  Goal: *Control of Pain  Outcome: Progressing Towards Goal     Problem: Patient Education: Go to Patient Education Activity  Goal: Patient/Family Education  Outcome: Progressing Towards Goal     Problem: Falls - Risk of  Goal: *ABSENCE OF FALLS  Description  Call bell within reach. Bed in lowest position. Gripper socks on  Side rails up   Outcome: Progressing Towards Goal     Problem: Patient Education: Go to Patient Education Activity  Goal: Patient/Family Education  Outcome: Progressing Towards Goal     Problem: Falls - Risk of  Goal: *Absence of Falls  Description  Document Edgar Brunner Fall Risk and appropriate interventions in the flowsheet.   Outcome: Progressing Towards Goal     Problem: Patient Education: Go to Patient Education Activity  Goal: Patient/Family Education  Outcome: Progressing Towards Goal     Problem: Infection - Risk of, Surgical Site Infection  Goal: *Absence of surgical site infection signs and symptoms  Outcome: Progressing Towards Goal

## 2019-04-30 NOTE — DISCHARGE SUMMARY
4/29/2019  5:40 AM    4/30/2019, 9:34 AM    Primary Dx:left Orthopedic / Rheumatologic: Total Knee Replacement  Secondary Dx: Etiological Diagnoses: none    HPI:  Pt has end stage OA and had failed conservative treatment. Due to the current findings and affected activity of daily living surgical intervention is indicated.   The alternatives, risks, complications as well as expected outcome were discussed, the patient understands and wishes to proceed with surgery    Past Medical History:   Diagnosis Date    Altered bowel elimination due to intestinal ostomy ([facility] [address]     RLQ - leakage from ileostomy    Arthritis     BMI 31.0-31.9,adult     31.8    Chronic pain     right leg pain; spine and hip down to ankle    Decreased exercise tolerance     mets <4 (SOB)    Depression     Diverticulitis     Fistula of enterostomy (HCC)     Hypertension     Incisional hernia     Sciatic pain     SOB (shortness of breath)          Current Facility-Administered Medications:     magnesium oxide tablet 250 mg, 250 mg, Oral, DAILY, Preet Maldonado MD, 250 mg at 04/30/19 0817    potassium chloride (K-DUR, KLOR-CON) SR tablet 20 mEq, 20 mEq, Oral, DAILY, Hilary Briceno MD, 20 mEq at 04/30/19 0818    0.9% sodium chloride infusion, 100 mL/hr, IntraVENous, CONTINUOUS, Hilary Briceno MD, Last Rate: 100 mL/hr at 04/29/19 1138, 100 mL/hr at 04/29/19 1138    sodium chloride (NS) flush 5-40 mL, 5-40 mL, IntraVENous, Q8H, Preet Maldonado MD, 10 mL at 04/30/19 0510    sodium chloride (NS) flush 5-40 mL, 5-40 mL, IntraVENous, PRN, Hilary Briceno MD    ferrous sulfate tablet 325 mg, 1 Tab, Oral, BID WITH MEALS, Hilary Briceno MD, 325 mg at 04/29/19 1653    zolpidem (AMBIEN) tablet 5 mg, 5 mg, Oral, QHS PRN, Hilary Briceno MD    acetaminophen (TYLENOL) tablet 1,000 mg, 1,000 mg, Oral, Q6H, Hilary Briceno MD, 1,000 mg at 04/30/19 0510    oxyCODONE IR (ROXICODONE) tablet 10-15 mg, 10-15 mg, Oral, Q3H PRN, Hilary Briceno MD, 10 mg at 04/30/19 0817    celecoxib (CELEBREX) capsule 100 mg, 100 mg, Oral, BID, Marquis Jo MD, 100 mg at 04/30/19 0818    naloxone [facility]) injection 0.4 mg, 0.4 mg, IntraVENous, PRN, Marquis Jo MD    flumazenil (ROMAZICON) 0.1 mg/mL injection 0.2 mg, 0.2 mg, IntraVENous, PRN, Marquis Jo MD    aspirin delayed-release tablet 325 mg, 325 mg, Oral, BID, Marquis Jo MD, 325 mg at 04/30/19 3974    clindamycin phosphate (CLEOCIN) 900 mg in 0.9% sodium chloride 100 mL IVPB, 900 mg, IntraVENous, Q8H, Marquis Jo MD, Last Rate: 200 mL/hr at 04/30/19 0042, 900 mg at 04/30/19 0042    ondansetron (ZOFRAN) injection 4 mg, 4 mg, IntraVENous, Q4H PRN, Marquis Jo MD, 4 mg at 04/29/19 2017    senna-docusate (PERICOLACE) 8.6-50 mg per tablet 1 Tab, 1 Tab, Oral, BID, Marquis Jo MD, 1 Tab at 04/30/19 5850    pregabalin (LYRICA) capsule 75 mg, 75 mg, Oral, BID, Marquis Jo MD, 75 mg at 04/30/19 0817    lisinopril (PRINIVIL, ZESTRIL) tablet 10 mg, 10 mg, Oral, DAILY, Fei Phoenix MD, 10 mg at 04/30/19 1812    ascorbic acid (vitamin C) (VITAMIN C) tablet 1,000 mg, 1,000 mg, Oral, DAILY, Fei Phoenix MD, 1,000 mg at 04/30/19 4729    Biotin cap 1 Cap (Patient Supplied), 1 Cap, Oral, DAILY, Francesca Ingram MD    cyanocobalamin tablet 1,000 mcg, 1,000 mcg, Oral, DAILY, Fei Phoenix MD, 1,000 mcg at 04/30/19 1955    vitamin E (AQUA GEMS) capsule 800 Units, 800 Units, Oral, DAILY, Fernandez Ingram MD    Ancef [cefazolin] and Cefazolin    Physical Exam:  General A&O x3 NAD, well developed, well nourished, normal affect  Heart: S1-S2, RRR  Lungs: CTA Bilat  Abd: soft NT, ND  Ext: n/v intact    Hospital Course:    Pt. Had leftOrthopedic / Rheumatologic: Total Knee Replacement    Post -op Course: The patient tolerated the procedure well. They were followed by internal medicine for help with medical management. Pt.  Was place on Abx pre and post-op for prophylaxis against infection as well as coumadin pre and post-op for prophylaxis against DVT. Vitals signs remained stable, remained af. The wound wasclean, dry, no drainage. Pain was well controlled. Pt. Had negative calf tenderness or swelling, no evidence for DVT. Patient had PT/OT consult for evaluation and treatment. CBC  Lab Results   Component Value Date/Time    WBC 9.0 04/17/2019 08:10 AM    RBC 4.30 04/17/2019 08:10 AM    HCT 40.1 04/17/2019 08:10 AM    MCV 93.3 04/17/2019 08:10 AM    MCH 30.0 04/17/2019 08:10 AM    MCHC 32.2 04/17/2019 08:10 AM    RDW 13.4 04/17/2019 08:10 AM     Coagulation  Lab Results   Component Value Date    INR 1.0 04/17/2019    APTT 30.9 04/17/2019      Basic Metabolic Profile  Lab Results   Component Value Date     04/17/2019    CO2 26 04/17/2019    BUN 38 (H) 04/17/2019       Discharge Meds:  Current Discharge Medication List      START taking these medications    Details   aspirin delayed-release 325 mg tablet Take 1 Tab by mouth two (2) times a day. Qty: 60 Tab, Refills: 1    Associated Diagnoses: Arthritis of knee      celecoxib (CELEBREX) 100 mg capsule Take 1 Cap by mouth two (2) times a day for 90 days. Qty: 60 Cap, Refills: 2    Associated Diagnoses: Arthritis of knee      ferrous sulfate 325 mg (65 mg iron) tablet Take 1 Tab by mouth two (2) times daily (with meals). Qty: 60 Tab, Refills: 1    Associated Diagnoses: Arthritis of knee      oxyCODONE-acetaminophen (PERCOCET) 7.5-325 mg per tablet Take 1-2 Tabs by mouth every six (6) hours as needed for Pain for up to 7 days. Max Daily Amount: 8 Tabs. Qty: 56 Tab, Refills: 0    Associated Diagnoses: Arthritis of knee      docusate sodium (COLACE) 100 mg capsule Take 1 Cap by mouth two (2) times a day for 90 days. Qty: 60 Cap, Refills: 2    Associated Diagnoses: Arthritis of knee         CONTINUE these medications which have NOT CHANGED    Details   Biotin 2,500 mcg cap Take  by mouth daily.       ascorbic acid, vitamin C, (VITAMIN C) 1,000 mg tablet Take 1,000 mg by mouth daily. magnesium 250 mg tab Take  by mouth daily. lisinopril (PRINIVIL, ZESTRIL) 20 mg tablet Take  by mouth daily. acetaminophen (TYLENOL) 650 mg TbER Take 650 mg by mouth every eight (8) hours. Takes 1 or 2 as needed  Indications: Pain associated with Arthritis      potassium chloride (KLOR-CON M20) 20 mEq tablet Take  by mouth daily. furosemide (LASIX) 20 mg tablet Take  by mouth two (2) times a day. Indications: Edema      cyanocobalamin (VITAMIN B-12) 1,000 mcg tablet Take 1,000 mcg by mouth daily. vitamin E (AQUA GEMS) 400 unit capsule Take 800 Units by mouth daily. STOP taking these medications       meloxicam (MOBIC) 15 mg tablet Comments:   Reason for Stopping:               Discharge Plan:  The patient will be d/c'd to home, total knee protocol, WBAT. [facility]. Total joint protocol. Pt safe for homebound transfer, sp Total joint replacement. A walker, bedside commode, and shower chair will be utilized for ADL's. Follow up with Dr. Sanjana Holder in 10-12 days. Call with any questions or concerns.

## 2019-04-30 NOTE — DISCHARGE INSTRUCTIONS
DISCHARGE SUMMARY from Nurse    PATIENT INSTRUCTIONS:    After general anesthesia or intravenous sedation, for 24 hours or while taking prescription Narcotics:  · Limit your activities  · Do not drive and operate hazardous machinery  · Do not make important personal or business decisions  · Do  not drink alcoholic beverages  · If you have not urinated within 8 hours after discharge, please contact your surgeon on call. Report the following to your surgeon:  · Excessive pain, swelling, redness or odor of or around the surgical area  · Temperature over 100.5  · Nausea and vomiting lasting longer than 4 hours or if unable to take medications  · Any signs of decreased circulation or nerve impairment to extremity: change in color, persistent  numbness, tingling, coldness or increase pain  · Any questions    What to do at Home:  Recommended activity: Activity as tolerated and No lifting, Driving, or Strenuous exercise until your doctor says its okay. If you experience any of the following symptoms: signs of infection such as fever, redness, swelling, or foul-smelling discharge from your incision site. Any numbness, tingling, weakness, or coldness to your surgical leg. Any nausea or vomiting that lasts longer than 4 hours. Any pain that does not get better with pain medication. Any heavy bleeding from your incision, please follow up with Dr. Desiree Nguyen. *  Please give a list of your current medications to your Primary Care Provider. *  Please update this list whenever your medications are discontinued, doses are      changed, or new medications (including over-the-counter products) are added. *  Please carry medication information at all times in case of emergency situations. These are general instructions for a healthy lifestyle:    No smoking/ No tobacco products/ Avoid exposure to second hand smoke  Surgeon General's Warning:  Quitting smoking now greatly reduces serious risk to your health.     Obesity, smoking, and sedentary lifestyle greatly increases your risk for illness    A healthy diet, regular physical exercise & weight monitoring are important for maintaining a healthy lifestyle    You may be retaining fluid if you have a history of heart failure or if you experience any of the following symptoms:  Weight gain of 3 pounds or more overnight or 5 pounds in a week, increased swelling in our hands or feet or shortness of breath while lying flat in bed. Please call your doctor as soon as you notice any of these symptoms; do not wait until your next office visit. Recognize signs and symptoms of STROKE:    F-face looks uneven    A-arms unable to move or move unevenly    S-speech slurred or non-existent    T-time-call 911 as soon as signs and symptoms begin-DO NOT go       Back to bed or wait to see if you get better-TIME IS BRAIN. Warning Signs of HEART ATTACK     Call 911 if you have these symptoms:   Chest discomfort. Most heart attacks involve discomfort in the center of the chest that lasts more than a few minutes, or that goes away and comes back. It can feel like uncomfortable pressure, squeezing, fullness, or pain.  Discomfort in other areas of the upper body. Symptoms can include pain or discomfort in one or both arms, the back, neck, jaw, or stomach.  Shortness of breath with or without chest discomfort.  Other signs may include breaking out in a cold sweat, nausea, or lightheadedness. Don't wait more than five minutes to call 911 - MINUTES MATTER! Fast action can save your life. Calling 911 is almost always the fastest way to get lifesaving treatment. Emergency Medical Services staff can begin treatment when they arrive -- up to an hour sooner than if someone gets to the hospital by car. The discharge information has been reviewed with the patient. The patient verbalized understanding.   Discharge medications reviewed with the patient and appropriate educational materials and side effects teaching were provided. ___________________________________________________________________________________________________________________________________    MyChart Activation    Thank you for requesting access to [employer]. Please follow the instructions below to securely access and download your online medical record. [facility] allows you to send messages to your doctor, view your test results, renew your prescriptions, schedule appointments, and more. How Do I Sign Up? 1. In your internet browser, go to [email or web address]  2. Click on the First Time User? Click Here link in the Sign In box. You will be redirect to the New Member Sign Up page. 3. Enter your [facility] Access Code exactly as it appears below. You will not need to use this code after youve completed the sign-up process. If you do not sign up before the expiration date, you must request a new code. [employer] Access Code: Activation code not generated  Current [facility] Status: Active (This is the date your [employer] access code will )    4. Enter the last four digits of your Social Security Number (xxxx) and Date of Birth (mm/dd/yyyy) as indicated and click Submit. You will be taken to the next sign-up page. 5. Create a [facility] ID. This will be your [facility] login ID and cannot be changed, so think of one that is secure and easy to remember. 6. Create a [facility] password. You can change your password at any time. 7. Enter your Password Reset Question and Answer. This can be used at a later time if you forget your password. 8. Enter your e-mail address. You will receive e-mail notification when new information is available in [address] 9. Click Sign Up. You can now view and download portions of your medical record. 10. Click the Download Summary menu link to download a portable copy of your medical information.     Additional Information    If you have questions, please visit the Frequently Asked Questions section of the MyChart website at [email or web address] Remember, [employer] is NOT to be used for urgent needs. For medical emergencies, dial 911.       Patient armband removed and shredded

## 2019-04-30 NOTE — PHYSICIAN ADVISORY
Letter of Determination: Inpatient Status Appropriate    This patient was originally hospitalized as Inpatient Status on 4/29/2019 for scheduled left total knee arthroplasty. This patient is appropriate for Inpatient Admission in accordance with CMS regulation Section 43 .3. Specifically, patient's stay is expected to be more than Two Midnights and was medically necessary. The patient's stay was medically necessary based on age > 72, creatinine of 2.35 mg/dl, serum calcium of 10.4 mg/dl, hypertension, decreased exercise tolerance to < 4 mets, and American Society of Anesthesia risk class 3. Consistent with CMS guidelines, patient meets for inpatient status. It is our recommendation that this patient's hospitalization status should be INPATIENT status.      The final decision regarding the patient's hospitalization status depends on the attending physician's judgement.     Claire Robison MD, BRISEIDA,   Physician [place].

## 2019-04-30 NOTE — OP NOTES
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 POSTOPERATIVE DIAGNOSIS:  Endstage arthritis of the left knee with valgus deformity. PROCEDURE PERFORMED:  Left total knee replacement using the [employer] Triathlon system with a size 4 left posterior-stabilized femoral component, size 4 tibia, size 4 11 TS insert, and a 32 asymmetric patella. SURGEON:  Linda Low. [place]ico Bronson MD    ASSISTANT:  Sean Elkins, first assistant. ANESTHESIA:  Preoperative femoral nerve block with light general.    COMPLICATIONS:  None. SPECIMENS REMOVED:  None. IMPLANTS:  As above mentioned. ESTIMATED BLOOD LOSS:  50 mL. SECOND ASSISTANT:  Monika Remedies. ANESTHESIOLOGIST:  Dr. Gus Cross. Sean Elkins was the first assistant who assisted with all phases of the surgery, commencing with patient positioning, patient prep, patient drape, leg positioning during surgery, retracting, assisting with the surgery itself, closure, dressing placement, and transfer. PROCEDURE:  After the anesthetic was successfully induced, it was confirmed the patient did receive preoperative antibiotics and a time-out was performed, midline incision and knee debrided in the usual fashion. Femoral canal aspirated, lavaged, and re-aspirated prior to instrumentation, cut for 5 degrees for the appropriate size. The crab claw was utilized for undercutting. All cuts checked for trueness and squareness, and all soft tissue structures protected during the sawing process. Modified gap balancing technique would be performed and preliminary femoral cuts were made. We would take an extra two off the distal femur.     Using the bent blunt Hohmann, we subluxed the tibia anteriorly and protected the neurovascular bundle, external alignment guide with appropriate landmarks and resected enough to get a decent cleanup cut. I then removed posterior osteophytes while protecting the neurovascular bundle and used the Aquamantys and Exparel cocktail. We then gap balanced the knee thus confirming correct femoral rotation. We then did our femoral finishing followed by placement of the trial components to set our tibial rotation which was marked and later repunched. We resurfaced the patella restoring patellar fitness anatomically and using a rongeur to smooth out the edges. With all the trial components in place, we checked the overall alignment, range of motion, soft tissue balance, patellar tracking, stability, and alignment all of which we were delighted with. I fashioned a bone plug for the femoral canal, further control of hemostasis, cemented in the knee, removing all extraneous cement holding the knee in full extension till the cement was fully cured. Further cement removed, further pulse lavage, further trailing, I was happiest with the 11 and locked it in place again reducing the knee, let the tourniquet down, routine closure, and fully flexed the knee prior to closure of the skin. At the end of the case, instruments, sponge, and needle counts were correct. No complications. The patient tolerated the procedure well, and blood loss less than 50. Excellent outcome of the case.       Zhanna Warren MD [id number]  D:  04/29/2019 9:24  T:  04/29/2019 13:08  JOB #:  [id number]

## 2019-04-30 NOTE — DIABETES MGMT
GLYCEMIC CONTROL SCREENING INITIATED:    Lab Results   Component Value Date/Time    Hemoglobin A1c 5.9 (H) 04/17/2019 08:10 AM      Lab Results   Component Value Date/Time    Glucose 112 (H) 04/17/2019 08:10 AM        Patient reported no history of diabetes. [x]  Patient meets criteria for pre-diabetes   HbA1c = 5.7-6.4%      [x]  Diabetes Self-Management class schedule provided and patient encouraged to attend.       Erving Lennox, RN  Pager: [phone]

## 2019-04-30 NOTE — PROGRESS NOTES
OCCUPATIONAL THERAPY EVALUATION/DISCHARGE    Patient: Yarelis Hdez (20 y.o. female)  Date: 4/30/2019  Primary Diagnosis: Osteoarthritis of left knee, unspecified osteoarthritis type [M17.12]  Arthritis of knee [M17.10]  Procedure(s) (LRB):  LEFT TOTAL KNEE ARTHROPLASTY (Left) 1 Day Post-Op   Precautions: Fall, WBAT  PLOF: Patient was independent with self-care and functional mobility PTA. ASSESSMENT AND RECOMMENDATIONS:  Patient cleared to participate in OT evaluation by RN. Patient is POD # 1 s/p left total knee replacement by Dr. Maria De Jesus Pearce. Upon entering room, patient was seated in recliner. Patient alert and agreeable to OT session. Based on the objective data described below, the patient presents with no deficits that impede pt function with ADLs, functional transfers, and functional mobility. Patient is modified independent with LB dressing using AE (reacher and sock-aid) and Supervision with functional transfers using rolling walker. Discussed use of a seat in the shower for safety and patient reported her  has one in his bathroom that she will be using upon d/c. Patient educated on weight-bearing status, importance of ice, and safety around the house. At this time patient is safe to d/c home with family support. OT to d/c from caseload at this time. Skilled occupational therapy is not indicated at this time. Discharge Recommendations: None  Further Equipment Recommendations for Discharge: Rolling Walker     SUBJECTIVE:   Patient stated ? I remember these dressing techniques from my other surgery?     OBJECTIVE DATA SUMMARY:     Past Medical History:   Diagnosis Date    Altered bowel elimination due to intestinal ostomy ([address]     RLQ - leakage from ileostomy    Arthritis     BMI 31.0-31.9,adult     31.8    Chronic pain     right leg pain; spine and hip down to ankle    Decreased exercise tolerance     mets <4 (SOB)    Depression     Diverticulitis     Fistula of enterostomy ([address] Hypertension     Incisional hernia     Sciatic pain     SOB (shortness of breath)      Past Surgical History:   Procedure Laterality Date    FLEXIBLE SIGMOIDOSCOPY N/A 4/26/2017    FLEXIBLE SIGMOIDOSCOPY  performed by Kel Talbert MD at [facility]    HX CHOLECYSTECTOMY  04/2018    HX GI  04/2017    reversal of ileostomy    HX HEENT      tonsillectomy    HX HERNIA REPAIR      HX KNEE REPLACEMENT N/A     right elbow repair    HX KNEE REPLACEMENT Right     HX OTHER SURGICAL  03/2017    reversal of ostomy     HX SMALL BOWEL RESECTION      ileostomy formation    HX QUENTIN AND BSO      HX TONSILLECTOMY      HX TUBAL LIGATION N/A     reopened tubes    HX TUBAL LIGATION      HX UROLOGICAL  03/01/2017    s/p Placement of bilateral double-J stents - 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      Barriers to Learning/Limitations: None  Compensate with: visual, verbal, tactile, kinesthetic cues/model    Home Situation:   Home Situation  Home Environment: Private residence  # Steps to Enter: 3  Rails to Enter: No(pt reports she has a sturdy post she holds)  One/Two Story Residence: Two story  # of Interior Steps: 15  Interior Rails: Left  Living Alone: No  Support Systems: Spouse/Significant Other/Partner()  Patient Expects to be Discharged to[employer]de[employer] residence  Current DME Used/Available at Home: Walker, rolling, [address] [employer] chair, Raised toilet seat, Commode, bedside, Cane, straight, Adaptive dressing aides  ? Right hand dominant   ? Left hand dominant    Cognitive/Behavioral Status:  Neurologic State: Alert  Orientation Level: Oriented X4  Cognition: Appropriate decision making  Safety/Judgement: Awareness of environment    Skin: intact  Edema: None noted    Vision/Perceptual:    Acuity: Within Defined Limits    Corrective Lenses: Glasses    Coordination: BUE  Fine Motor Skills-Upper: Left Intact; Right Intact    Gross Motor Skills-Upper: Left Intact; Right Intact    Balance:  Sitting: Intact  Standing: Intact; With support  Standing - Static: Good  Standing - Dynamic : Fair    Strength: BUE  Strength: Within functional limits    Tone & Sensation: BUE  Tone: Normal  Sensation: Intact    Range of Motion: BUE  AROM: Within functional limits    Functional Mobility and Transfers for ADLs:  Bed Mobility:  Not assessed 2* to patient being up in recliner upon entering room and patient wanting to finish breakfast after toilet transfer    Transfers:  Patient simulated bathroom layout at home identifying where she places hands when transferring to/from toilet for safety. Sit to Stand: Supervision  Stand to Sit: Supervision   Toilet Transfer : Supervision    ADL Assessment:  Feeding: Independent    Oral Facial Hygiene/Grooming: Independent    Bathing: Modified independent    Upper Body Dressing: Independent    Lower Body Dressing: Modified independent    Toileting: Independent    ADL Intervention:  Feeding  Feeding Assistance: Independent  Container Management: Independent  Utensil Management: Independent  Food to Mouth: Independent  Drink to Mouth: Independent    Grooming  Washing Hands: Independent    Lower Body Dressing Assistance  Socks: Modified independent  Adaptive Equipment Used: Reacher;Sock aid  Patient educated on AE and how to don/doff socks. Patient reported having reacher at home and recalling dressing techniques from previous surgery. Toileting  Toileting Assistance: Independent  Clothing Management: Independent    Cognitive Retraining  Safety/Judgement: Awareness of environment      Pain:  Pain level pre-treatment: 7/10, in left knee  Pain level post-treatment: 8/10, in left knee   Pain Intervention(s): Medication (see MAR); Rest, Ice, Repositioning  Response to intervention: Nurse notified, See doc flow    Activity Tolerance:   Patient demonstrated good activity tolerance during OT evaluation. Please refer to the flowsheet for vital signs taken during this treatment.   After treatment:   ?  Patient left in no apparent distress sitting up in chair  ? Patient left in no apparent distress in bed  ? Call bell left within reach  ? Nursing notified  ? Caregiver present  ? Bed alarm activated    COMMUNICATION/EDUCATION:   ?      Role of Occupational Therapy in the acute care setting  ? Home safety education was provided and the patient/caregiver indicated understanding. ? Patient/family have participated as able and agree with findings and recommendations. ?      Patient is unable to participate in plan of care at this time. Thank you for this referral.  Giuliana Baugh OTR/L  Time Calculation: 26 mins      Eval Complexity: History: LOW Complexity : Brief history review ; Examination: LOW Complexity : 1-3 performance deficits relating to physical, cognitive , or psychosocial skils that result in activity limitations and / or participation restrictions ;    Decision Making:LOW Complexity : No comorbidities that affect functional and no verbal or physical assistance needed to complete eval tasks

## 2019-05-01 ENCOUNTER — TELEPHONE (OUTPATIENT)
Dept: ORTHOPEDIC SURGERY | Age: 69
End: 2019-05-01

## 2019-05-01 NOTE — TELEPHONE ENCOUNTER
4/29/19 - left total knee arthroplasty    [facility] called re: rx for oxyCODONE-acetaminophen (PERCOCET) 7.5-325 mg per tablet - in order to comply with federal reg's, please include \"take no more than 4 tabs daily\" to dosing instructions or else they will need to change amount patient can be provided.   [facility] phone # is [phone].

## 2019-05-02 NOTE — TELEPHONE ENCOUNTER
Spoke with pharmacy alejandra Rushing, she stated patient took the Rx elsewhere. Clarification no longer needed.

## 2019-05-16 ENCOUNTER — OFFICE VISIT (OUTPATIENT)
Dept: ORTHOPEDIC SURGERY | Age: 69
End: 2019-05-16

## 2019-05-16 VITALS
WEIGHT: 209.4 LBS | DIASTOLIC BLOOD PRESSURE: 56 MMHG | TEMPERATURE: 99.2 F | SYSTOLIC BLOOD PRESSURE: 126 MMHG | OXYGEN SATURATION: 99 % | HEIGHT: 65 IN | HEART RATE: 77 BPM | RESPIRATION RATE: 18 BRPM | BODY MASS INDEX: 34.89 KG/M2

## 2019-05-16 DIAGNOSIS — R60.9 SWELLING: ICD-10-CM

## 2019-05-16 DIAGNOSIS — Z96.652 STATUS POST LEFT KNEE REPLACEMENT: Primary | ICD-10-CM

## 2019-05-16 RX ORDER — OXYCODONE AND ACETAMINOPHEN 5; 325 MG/1; MG/1
1-2 TABLET ORAL
COMMUNITY

## 2019-05-16 RX ORDER — OXYCODONE AND ACETAMINOPHEN 7.5; 325 MG/1; MG/1
1-2 TABLET ORAL
Qty: 56 TAB | Refills: 0 | Status: SHIPPED | OUTPATIENT
Start: 2019-05-16 | End: 2019-05-30

## 2019-05-16 NOTE — PROGRESS NOTES
[address] [facility], [address] [facility]  [phone]           Patient: Yarelis Hdez                MRN: [id number]       SSN: [id number]  YOB: 1950        AGE: 81 y.o. SEX: female  Body mass index is 34.85 kg/m². PCP: Nisha Hollis MD  05/16/19      This office note has been dictated. REVIEW OF SYSTEMS:  Constitutional: Negative for fever, chills, weight loss and malaise/fatigue. HENT: Negative. Eyes: Negative. Respiratory: Negative. Cardiovascular: Negative. Gastrointestinal: No bowel incontinence or constipation. Genitourinary: No bladder incontinence or saddle anesthesia. Skin: Negative. Neurological: Negative. Endo/Heme/Allergies: Negative. Psychiatric/Behavioral: Negative. Musculoskeletal: As per HPI above. Past Medical History:   Diagnosis Date    Altered bowel elimination due to intestinal ostomy ([facility] [address]     RLQ - leakage from ileostomy    Arthritis     BMI 31.0-31.9,adult     31.8    Chronic pain     right leg pain; spine and hip down to ankle    Decreased exercise tolerance     mets <4 (SOB)    Depression     Diverticulitis     Fistula of enterostomy (HCC)     Hypertension     Incisional hernia     Sciatic pain     SOB (shortness of breath)          Current Outpatient Medications:     oxyCODONE-acetaminophen (PERCOCET) 5-325 mg per tablet, Take 1-2 Tabs by mouth every six (6) hours as needed for Pain., Disp: , Rfl:     aspirin delayed-release 325 mg tablet, Take 1 Tab by mouth two (2) times a day., Disp: 60 Tab, Rfl: 1    celecoxib (CELEBREX) 100 mg capsule, Take 1 Cap by mouth two (2) times a day for 90 days. , Disp: 60 Cap, Rfl: 2    ferrous sulfate 325 mg (65 mg iron) tablet, Take 1 Tab by mouth two (2) times daily (with meals). , Disp: 60 Tab, Rfl: 1    docusate sodium (COLACE) 100 mg capsule, Take 1 Cap by mouth two (2) times a day for 90 days. , Disp: 60 Cap, Rfl: 2    Biotin 2,500 mcg cap, Take  by mouth daily. , Disp: , Rfl:     ascorbic acid, vitamin C, (VITAMIN C) 1,000 mg tablet, Take 1,000 mg by mouth daily. , Disp: , Rfl:     magnesium 250 mg tab, Take  by mouth daily. , Disp: , Rfl:     lisinopril (PRINIVIL, ZESTRIL) 20 mg tablet, Take  by mouth daily. , Disp: , Rfl:     acetaminophen (TYLENOL) 650 mg TbER, Take 650 mg by mouth every eight (8) hours. Takes 1 or 2 as needed  Indications: Pain associated with Arthritis, Disp: , Rfl:     potassium chloride (KLOR-CON M20) 20 mEq tablet, Take  by mouth daily. , Disp: , Rfl:     furosemide (LASIX) 20 mg tablet, Take  by mouth two (2) times a day. Indications: Edema, Disp: , Rfl:     cyanocobalamin (VITAMIN B-12) 1,000 mcg tablet, Take 1,000 mcg by mouth daily. , Disp: , Rfl:     vitamin E (AQUA GEMS) 400 unit capsule, Take 800 Units by mouth daily. , Disp: , Rfl:     Allergies   Allergen Reactions    Ancef [Cefazolin] Itching and Hives    Cefazolin Hives       Social History     Socioeconomic History    Marital status:      Spouse name: Not on file    Number of children: Not on file    Years of education: Not on file    Highest education level: Not on file   Occupational History    Not on file   Social Needs    Financial resource strain: Not on file    Food insecurity:     Worry: Not on file     Inability: Not on file    Transportation needs:     Medical: Not on file     Non-medical: Not on file   Tobacco Use    Smoking status: Former Smoker     Last attempt to quit: 2017     Years since quittin.3    Smokeless tobacco: Never Used   Substance and Sexual Activity    Alcohol use: No    Drug use: No    Sexual activity: Not on file   Lifestyle    Physical activity:     Days per week: Not on file     Minutes per session: Not on file    Stress: Not on file   Relationships    Social connections:     Talks on phone: Not on file     Gets together: Not on file     Attends Jain service: Not on file     Active member of club or organization: Not on file     Attends meetings of clubs or organizations: Not on file     Relationship status: Not on file    Intimate partner violence:     Fear of current or ex partner: Not on file     Emotionally abused: Not on file     Physically abused: Not on file     Forced sexual activity: Not on file   Other Topics Concern    Not on file   Social History Narrative    ** Merged History Encounter **            Past Surgical History:   Procedure Laterality Date    FLEXIBLE SIGMOIDOSCOPY N/A 4/26/2017    FLEXIBLE SIGMOIDOSCOPY  performed by Gary Whitaker MD at [address] HX CHOLECYSTECTOMY  04/2018    HX GI  04/2017    reversal of ileostomy    HX HEENT      tonsillectomy    HX HERNIA REPAIR      HX KNEE REPLACEMENT N/A     right elbow repair    HX KNEE REPLACEMENT Right     HX OTHER SURGICAL  03/2017    reversal of ostomy     HX SMALL BOWEL RESECTION      ileostomy formation    HX QUENTIN AND BSO      HX TONSILLECTOMY      HX TUBAL LIGATION N/A     reopened tubes    HX TUBAL LIGATION      HX UROLOGICAL  03/01/2017    s/p Placement of bilateral double-J stents - 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 Ivelisse Toth today in the office for followup with regards to her left total knee replacement. The patient is now 17 days status post surgery and is progressing nicely. She is doing well. Her pain is well-controlled. She is requesting a refill of her pain medicine. She is receiving home physical therapy without complications. She is reporting a little bit of leg swelling and a little bit of pain in the back of her leg at times. She does continue on aspirin. She has had no chest pain or shortness of breath. PHYSICAL EXAMINATION:  In general, the patient is alert and oriented x 3 in no acute distress. The patient is well-developed, well-nourished, with a normal affect.   The patient is afebrile. Examination of the left knee reveals the skin is intact. The surgical wound is healed up very nicely. There is no erythema, ecchymosis, and no warmth or signs of infection or cellulitis present. Range of motion is full extension with no extensor lag. There are no defects noted to the extensor mechanism. She flexes to 105ø. The patella tracks nicely. There are no rubs or crepitus noted. She does have mild edema distally without calf tenderness and negative Lizzy's. There are no signs for DVT present. ASSESSMENT:  Status post left knee replacement. PLAN:  At this point, we are going to move forward with an ultrasound of the left lower extremity to rule out DVT. Clinically, there is not significant evidence for this. We will have her stop her aspirin and continue ice therapy. The staples were removed today in the office without complications and replaced with Steri-Strips. She will be set up with outpatient physical therapy. She was given a refill of her pain medicine. We will see her back in two weeks' time for evaluation and range of motion check. She will call with any questions or concerns that shall arise.                  JR Zia ARNOLD, PABaileyC, ATC

## 2019-05-16 NOTE — PROGRESS NOTES
1. Have you been to the ER, urgent care clinic since your last visit? Hospitalized since your last visit? No    2. Have you seen or consulted any other health care providers outside of the [address] [place] [address] since your last visit? Include any pap smears or colon screening.  No

## 2019-05-30 ENCOUNTER — OFFICE VISIT (OUTPATIENT)
Dept: ORTHOPEDIC SURGERY | Age: 69
End: 2019-05-30

## 2019-05-30 VITALS
RESPIRATION RATE: 17 BRPM | OXYGEN SATURATION: 97 % | DIASTOLIC BLOOD PRESSURE: 50 MMHG | HEART RATE: 90 BPM | BODY MASS INDEX: 33.62 KG/M2 | HEIGHT: 65 IN | SYSTOLIC BLOOD PRESSURE: 92 MMHG | WEIGHT: 201.8 LBS | TEMPERATURE: 97.1 F

## 2019-05-30 DIAGNOSIS — Z96.652 STATUS POST LEFT KNEE REPLACEMENT: Primary | ICD-10-CM

## 2019-05-30 NOTE — PROGRESS NOTES
1. Have you been to the ER, urgent care clinic since your last visit? Hospitalized since your last visit? No    2. Have you seen or consulted any other health care providers outside of the [address] [place] [address] since your last visit? Include any pap smears or colon screening.  No

## 2019-05-30 NOTE — PROGRESS NOTES
[address] [facility], [address] [facility]  [phone]           Patient: Zunilda Bear                MRN: [id number]       SSN: [id number]  YOB: 1950        AGE: 81 y.o. SEX: female  Body mass index is 33.58 kg/m². PCP: Umesh Virk MD  05/30/19      This office note has been dictated. REVIEW OF SYSTEMS:  Constitutional: Negative for fever, chills, weight loss and malaise/fatigue. HENT: Negative. Eyes: Negative. Respiratory: Negative. Cardiovascular: Negative. Gastrointestinal: No bowel incontinence or constipation. Genitourinary: No bladder incontinence or saddle anesthesia. Skin: Negative. Neurological: Negative. Endo/Heme/Allergies: Negative. Psychiatric/Behavioral: Negative. Musculoskeletal: As per HPI above. 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)     RLQ - leakage from ileostomy    Arthritis     BMI 31.0-31.9,adult     31.8    Chronic pain     right leg pain; spine and hip down to ankle    Decreased exercise tolerance     mets <4 (SOB)    Depression     Diverticulitis     Fistula of enterostomy (HCC)     Hypertension     Incisional hernia     Sciatic pain     SOB (shortness of breath)          Current Outpatient Medications:     oxyCODONE-acetaminophen (PERCOCET) 5-325 mg per tablet, Take 1-2 Tabs by mouth every six (6) hours as needed for Pain., Disp: , Rfl:     oxyCODONE-acetaminophen (PERCOCET) 7.5-325 mg per tablet, Take 1-2 Tabs by mouth every six (6) hours as needed for Pain for up to 14 days. Max Daily Amount: 8 Tabs., Disp: 56 Tab, Rfl: 0    aspirin delayed-release 325 mg tablet, Take 1 Tab by mouth two (2) times a day., Disp: 60 Tab, Rfl: 1    celecoxib (CELEBREX) 100 mg capsule, Take 1 Cap by mouth two (2) times a day for 90 days. , Disp: 60 Cap, Rfl: 2    ferrous sulfate 325 mg (65 mg iron) tablet, Take 1 Tab by mouth two (2) times daily (with meals). , Disp: 60 Tab, Rfl: 1    docusate sodium (COLACE) 100 mg capsule, Take 1 Cap by mouth two (2) times a day for 90 days. , Disp: 60 Cap, Rfl: 2    Biotin 2,500 mcg cap, Take  by mouth daily. , Disp: , Rfl:     ascorbic acid, vitamin C, (VITAMIN C) 1,000 mg tablet, Take 1,000 mg by mouth daily. , Disp: , Rfl:     magnesium 250 mg tab, Take  by mouth daily. , Disp: , Rfl:     lisinopril (PRINIVIL, ZESTRIL) 20 mg tablet, Take  by mouth daily. , Disp: , Rfl:     acetaminophen (TYLENOL) 650 mg TbER, Take 650 mg by mouth every eight (8) hours. Takes 1 or 2 as needed  Indications: Pain associated with Arthritis, Disp: , Rfl:     potassium chloride (KLOR-CON M20) 20 mEq tablet, Take  by mouth daily. , Disp: , Rfl:     furosemide (LASIX) 20 mg tablet, Take  by mouth two (2) times a day. Indications: Edema, Disp: , Rfl:     cyanocobalamin (VITAMIN B-12) 1,000 mcg tablet, Take 1,000 mcg by mouth daily. , Disp: , Rfl:     vitamin E (AQUA GEMS) 400 unit capsule, Take 800 Units by mouth daily. , Disp: , Rfl:     Allergies   Allergen Reactions    Ancef [Cefazolin] Itching and Hives    Cefazolin Hives       Social History     Socioeconomic History    Marital status:      Spouse name: Not on file    Number of children: Not on file    Years of education: Not on file    Highest education level: Not on file   Occupational History    Not on file   Social Needs    Financial resource strain: Not on file    Food insecurity:     Worry: Not on file     Inability: Not on file    Transportation needs:     Medical: Not on file     Non-medical: Not on file   Tobacco Use    Smoking status: Former Smoker     Last attempt to quit: 2017     Years since quittin.4    Smokeless tobacco: Never Used   Substance and Sexual Activity    Alcohol use: No    Drug use: No    Sexual activity: Not on file   Lifestyle    Physical activity:     Days per week: Not on file Minutes per session: Not on file    Stress: Not on file   Relationships    Social connections:     Talks on phone: Not on file     Gets together: Not on file     Attends Lutheran service: Not on file     Active member of club or organization: Not on file     Attends meetings of clubs or organizations: Not on file     Relationship status: Not on file    Intimate partner violence:     Fear of current or ex partner: Not on file     Emotionally abused: Not on file     Physically abused: Not on file     Forced sexual activity: Not on file   Other Topics Concern    Not on file   Social History Narrative    ** Merged History Encounter **            Past Surgical History:   Procedure Laterality Date    FLEXIBLE SIGMOIDOSCOPY N/A 4/26/2017    [facility]    HX GI  04/2017    reversal of ileostomy    HX HEENT      tonsillectomy    HX HERNIA REPAIR      HX KNEE REPLACEMENT N/A     right elbow repair    HX KNEE REPLACEMENT Right     HX OTHER SURGICAL  03/2017    reversal of ostomy     HX SMALL BOWEL RESECTION      ileostomy formation    HX QUENTIN AND BSO      HX TONSILLECTOMY      HX TUBAL LIGATION N/A     reopened tubes    HX TUBAL LIGATION      HX UROLOGICAL  03/01/2017    s/p Placement of bilateral double-J stents - 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 Gina Barbosa today for followup with regards to her left knee replacement. The patient is now four weeks status post surgery and is progressing well from her knee replacement. She does report she feels a little tired. She has no chest pain and no shortness of breath. She is taking minimal pain medicine. She has had no fever, chills, or systemic changes to report otherwise. PHYSICAL EXAMINATION:  In general, the patient is alert and oriented x 3 in no acute distress.   The patient is well-developed, well-nourished, with a normal affect. The patient is afebrile. HEENT:  Head is normocephalic and atraumatic. Pupils are equally round and reactive to light and accommodation. Extraocular eye movements are intact. Neck is supple. Trachea is midline. No JVD is present. Breathing is nonlabored. Examination of the left knee reveals the skin is intact. The surgical wounds are healed nicely. There is no erythema, ecchymosis, and no warmth or signs of infection or cellulitis present. She has full range of motion, very good stability, and the patella tracks nicely without rubs or crepitus noted. ASSESSMENT:  Status post left knee replacement. PLAN:  At this point, the patient is doing well from her knee replacement standpoint. She is working very hard. She will continue physical therapy. She denies the need for analgesics. We will obtain a CBC to check on hemoglobin and hematocrit. We will see her back in the office in two weeks' time for evaluation, range of motion check, x-rays, and lab work review. She will call with any questions or concerns that shall arise.                       JR Zia ARNOLD, KYEL, ATC

## 2019-06-13 ENCOUNTER — OFFICE VISIT (OUTPATIENT)
Dept: ORTHOPEDIC SURGERY | Age: 69
End: 2019-06-13

## 2019-06-13 VITALS
SYSTOLIC BLOOD PRESSURE: 126 MMHG | DIASTOLIC BLOOD PRESSURE: 79 MMHG | HEART RATE: 82 BPM | HEIGHT: 65 IN | BODY MASS INDEX: 33.82 KG/M2 | OXYGEN SATURATION: 99 % | TEMPERATURE: 98.2 F | RESPIRATION RATE: 16 BRPM | WEIGHT: 203 LBS

## 2019-06-13 DIAGNOSIS — M25.551 RIGHT HIP PAIN: ICD-10-CM

## 2019-06-13 DIAGNOSIS — Z96.652 STATUS POST LEFT KNEE REPLACEMENT: Primary | ICD-10-CM

## 2019-06-13 RX ORDER — MELOXICAM 15 MG/1
15 TABLET ORAL DAILY
COMMUNITY

## 2019-06-13 NOTE — PROGRESS NOTES
Patient: Chioma Rogers                MRN: [id number]       SSN: [id number]  YOB: 1950        AGE: 81 y.o. SEX: female  Body mass index is 33.78 kg/m². PCP: Angélica Rodrigues MD  06/13/19    HISTORY:  Ms. Violette Schirmer returns in follow-up. She is about 6 weeks following a total knee replacement, and overall is quite pleased with how things are going. She is overall doing quite well with her knee replacement. She is quite unhappy with the previous knee replacement she had on the right side, status post right hip replacement. Known osteoporosis. PHYSICAL EXAMINATION:  On exam today, nicely healed wound. Full extension. Bends to about 115 to 117 degrees. Calf nontender. She does walk with a mild Trendelenburg gait. It is actually bilateral.  Her abductor strength is coming along well, but I recommended a little further strengthening. IMPRESSION:  She is going to go back for physical therapy for her knee as per the recommendations. Will have her do some abduction exercises while lying on her side for the right hip as well. When she returns, will obtain x-ray of the right hip as well. It has been a pleasure to share in her care. She is having an A+ result so far with the knee replacement. I am very happy for her. Her hemoglobin was about 11.3, and I have asked her to follow-up with you with regards to this. REVIEW OF SYSTEMS:      CON: negative for weight loss, fever  EYE: negative for double vision  ENT: negative for hoarseness  RS:   negative for Tb  GI:    negative for blood in stool  :  negative for blood in urine  Other systems reviewed and noted below.           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)     RLQ - leakage from ileostomy    Arthritis     BMI 31.0-31.9,adult     31.8    Chronic pain     right leg pain; spine and hip down to ankle    Decreased exercise tolerance     mets <4 (SOB)    Depression     Diverticulitis     Fistula of enterostomy (HCC)     Hypertension     Incisional hernia     Sciatic pain     SOB (shortness of breath)        Family History   Problem Relation Age of Onset    Heart Disease Mother     Heart Attack Mother     Other Mother     No Known Problems Other     Other Sister         hip dysplasia    Other Daughter         hip dysplasia       Current Outpatient Medications   Medication Sig Dispense Refill    meloxicam (MOBIC) 15 mg tablet Take 15 mg by mouth daily.  aspirin delayed-release 325 mg tablet Take 1 Tab by mouth two (2) times a day. 60 Tab 1    celecoxib (CELEBREX) 100 mg capsule Take 1 Cap by mouth two (2) times a day for 90 days. 60 Cap 2    ferrous sulfate 325 mg (65 mg iron) tablet Take 1 Tab by mouth two (2) times daily (with meals). 60 Tab 1    docusate sodium (COLACE) 100 mg capsule Take 1 Cap by mouth two (2) times a day for 90 days. 60 Cap 2    Biotin 2,500 mcg cap Take  by mouth daily.  ascorbic acid, vitamin C, (VITAMIN C) 1,000 mg tablet Take 1,000 mg by mouth daily.  magnesium 250 mg tab Take  by mouth daily.  lisinopril (PRINIVIL, ZESTRIL) 20 mg tablet Take  by mouth daily.  acetaminophen (TYLENOL) 650 mg TbER Take 650 mg by mouth every eight (8) hours. Takes 1 or 2 as needed  Indications: Pain associated with Arthritis      potassium chloride (KLOR-CON M20) 20 mEq tablet Take  by mouth daily.  furosemide (LASIX) 20 mg tablet Take  by mouth two (2) times a day. Indications: Edema      cyanocobalamin (VITAMIN B-12) 1,000 mcg tablet Take 1,000 mcg by mouth daily.  vitamin E (AQUA GEMS) 400 unit capsule Take 800 Units by mouth daily.  oxyCODONE-acetaminophen (PERCOCET) 5-325 mg per tablet Take 1-2 Tabs by mouth every six (6) hours as needed for Pain.          Allergies   Allergen Reactions    Ancef [Cefazolin] Itching and Hives    Cefazolin Hives       Past Surgical History:   Procedure Laterality Date    FLEXIBLE SIGMOIDOSCOPY N/A 2017    FLEXIBLE SIGMOIDOSCOPY  performed by [facility] at [facility] MA[facility] OR    HX CHOLECYSTECTOMY  2018    HX GI  2017    reversal of ileostomy    HX HEENT      tonsillectomy    HX HERNIA REPAIR      HX KNEE REPLACEMENT N/A     right elbow repair    HX KNEE REPLACEMENT Right     HX OTHER SURGICAL  2017    reversal of ostomy     HX SMALL BOWEL RESECTION      ileostomy formation    HX QUENTIN AND BSO      HX TONSILLECTOMY      HX TUBAL LIGATION N/A     reopened tubes    HX TUBAL LIGATION      HX UROLOGICAL  2017    s/p Placement of bilateral double-J stents - 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        Social History     Socioeconomic History    Marital status:      Spouse name: Not on file    Number of children: Not on file    Years of education: Not on file    Highest education level: Not on file   Occupational History    Not on file   Social Needs    Financial resource strain: Not on file    Food insecurity:     Worry: Not on file     Inability: Not on file    Transportation needs:     Medical: Not on file     Non-medical: Not on file   Tobacco Use    Smoking status: Former Smoker     Last attempt to quit: 2017     Years since quittin.4    Smokeless tobacco: Never Used   Substance and Sexual Activity    Alcohol use: No    Drug use: No    Sexual activity: Not on file   Lifestyle    Physical activity:     Days per week: Not on file     Minutes per session: Not on file    Stress: Not on file   Relationships    Social connections:     Talks on phone: Not on file     Gets together: Not on file     Attends Latter-day service: Not on file     Active member of club or organization: Not on file     Attends meetings of clubs or organizations: Not on file     Relationship status: Not on file    Intimate partner violence:     Fear of current or ex partner: Not on file     Emotionally abused: Not on file     Physically abused: Not on file     Forced sexual activity: Not on file   Other Topics Concern    Not on file   Social History Narrative    ** Merged History Encounter **            Visit Vitals  /79   Pulse 82   Temp 98.2 °F (36.8 °C) (Oral)   Resp 16   Ht 5' 5\" (1.651 m)   Wt 203 lb (92.1 kg)   SpO2 99%   BMI 33.78 kg/m²         PHYSICAL EXAMINATION:  GENERAL: Alert and oriented x3, in no acute distress, well-developed, well-nourished, afebrile. HEART: No JVD. EYES: No scleral icterus   NECK: No significant lymphadenopathy   LUNGS: No respiratory compromise or indrawing  ABDOMEN: Soft, non-tender, non-distended. Electronically signed by:  Catrachito Neves MD

## 2019-09-13 ENCOUNTER — OFFICE VISIT (OUTPATIENT)
Dept: ORTHOPEDIC SURGERY | Age: 69
End: 2019-09-13

## 2019-09-13 VITALS
OXYGEN SATURATION: 98 % | TEMPERATURE: 97.8 F | RESPIRATION RATE: 17 BRPM | WEIGHT: 206 LBS | SYSTOLIC BLOOD PRESSURE: 131 MMHG | HEIGHT: 65 IN | BODY MASS INDEX: 34.32 KG/M2 | HEART RATE: 79 BPM | DIASTOLIC BLOOD PRESSURE: 63 MMHG

## 2019-09-13 DIAGNOSIS — M25.551 PAIN IN RIGHT HIP: Primary | ICD-10-CM

## 2019-09-13 DIAGNOSIS — M70.61 TROCHANTERIC BURSITIS, RIGHT HIP: ICD-10-CM

## 2019-09-13 DIAGNOSIS — M16.12 PRIMARY OSTEOARTHRITIS OF LEFT HIP: ICD-10-CM

## 2019-09-13 DIAGNOSIS — Z96.653 HISTORY OF BILATERAL KNEE REPLACEMENT: ICD-10-CM

## 2019-09-13 DIAGNOSIS — Z96.641 HISTORY OF TOTAL RIGHT HIP REPLACEMENT: ICD-10-CM

## 2019-09-13 NOTE — PROGRESS NOTES
1. Have you been to the ER, urgent care clinic since your last visit? Hospitalized since your last visit? No    2. Have you seen or consulted any other health care providers outside of the [address] [place] [address] since your last visit? Include any pap smears or colon screening.  No

## 2019-09-13 NOTE — PROGRESS NOTES
Patient: Delisa Anne                MRN: [id number]       SSN: [id number]  YOB: 1950        AGE: 81 y.o. SEX: female  Body mass index is 34.28 kg/m². PCP: Kapil Mcadams MD  09/13/19    Dear Josiah Christie:    HISTORY: I had the pleasure of seeing Ms. Linn Wasserman. She is doing extremely well now with her left knee replacement about 4-1/2 months out. Notices and occasional click but has done very well. As you know, Dr. Lula Chu replaced her other knee and she has had some off and ongoing issues with it, including some midsection instability. As you know, she had a hip replacement about a year and a half ago, had a small trochanteric fracture and a small rent in the IT band as a result of some trauma a few months after surgery, but is actually quite happy with the hip. Occasional discomfort when she rolls over on it but otherwise quite pleased. PHYSICAL EXAMINATION:  Just slight Trendelenburg gait when she first gets up owing to the right hip. The left knee looks terrific. Full motion, excellent stability, excellent range of motion. No effusion. Incision is healing very nicely. The right knee replacement done elsewhere has some mild to moderate midsection instability but not severely so and I specifically tested her abduction and hip flexion strength on the right side and it is actually very, very good. RADIOGRAPHS:  X-rays, AP pelvis and AP of the hip, x-rays of the knees show that the total hip replacement is in excellent position and alignment. She has about a 1 cm chronic avulsion-type fracture. No change in position and the implants are well aligned and well fixed. The left knee looks terrific on AP and lateral views. OVERALL IMPRESSION:  She is doing very, very well after total knee replacement four months ago. We are watching the right knee in the right hip is relatively quiescent with a touch of bursitis. She does not want an injection.     We will see her back at the new year and I wanted to thank you very much for allowing me to share in her care. Yours sincerely,    cc:  Doreen Gonzalez MD        REVIEW OF SYSTEMS:      CON: negative for weight loss, fever  EYE: negative for double vision  ENT: negative for hoarseness  RS:   negative for Tb  GI:    negative for blood in stool  :  negative for blood in urine  Other systems reviewed and noted below. 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)     RLQ - leakage from ileostomy    Arthritis     BMI 31.0-31.9,adult     31.8    Chronic pain     right leg pain; spine and hip down to ankle    Decreased exercise tolerance     mets <4 (SOB)    Depression     Diverticulitis     Fistula of enterostomy (HCC)     Hypertension     Incisional hernia     Sciatic pain     SOB (shortness of breath)        Family History   Problem Relation Age of Onset    Heart Disease Mother     Heart Attack Mother     Other Mother     No Known Problems Other     Other Sister         hip dysplasia    Other Daughter         hip dysplasia       Current Outpatient Medications   Medication Sig Dispense Refill    meloxicam (MOBIC) 15 mg tablet Take 15 mg by mouth daily.  oxyCODONE-acetaminophen (PERCOCET) 5-325 mg per tablet Take 1-2 Tabs by mouth every six (6) hours as needed for Pain.  aspirin delayed-release 325 mg tablet Take 1 Tab by mouth two (2) times a day. 60 Tab 1    ferrous sulfate 325 mg (65 mg iron) tablet Take 1 Tab by mouth two (2) times daily (with meals). 60 Tab 1    Biotin 2,500 mcg cap Take  by mouth daily.  ascorbic acid, vitamin C, (VITAMIN C) 1,000 mg tablet Take 1,000 mg by mouth daily.  magnesium 250 mg tab Take  by mouth daily.  lisinopril (PRINIVIL, ZESTRIL) 20 mg tablet Take  by mouth daily.  acetaminophen (TYLENOL) 650 mg TbER Take 650 mg by mouth every eight (8) hours.  Takes 1 or 2 as needed  Indications: Pain associated with Arthritis      potassium chloride (KLOR-CON M20) 20 mEq tablet Take  by mouth daily.  furosemide (LASIX) 20 mg tablet Take  by mouth two (2) times a day. Indications: Edema      cyanocobalamin (VITAMIN B-12) 1,000 mcg tablet Take 1,000 mcg by mouth daily.  vitamin E (AQUA GEMS) 400 unit capsule Take 800 Units by mouth daily.          Allergies   Allergen Reactions    Ancef [Cefazolin] Itching and Hives    Cefazolin Hives       Past Surgical History:   Procedure Laterality Date    FLEXIBLE SIGMOIDOSCOPY N/A 2017    FLEXIBLE SIGMOIDOSCOPY  performed by [facility]acc [facility]aved [facility] at [facility] MAIN OR    HX CHOLECYSTECTOMY  2018    HX GI  2017    reversal of ileostomy    HX HEENT      tonsillectomy    HX HERNIA REPAIR      HX KNEE REPLACEMENT N/A     right elbow repair    HX KNEE REPLACEMENT Right     HX OTHER SURGICAL  2017    reversal of ostomy     HX SMALL BOWEL RESECTION      ileostomy formation    HX QUENTIN AND BSO      HX TONSILLECTOMY      HX TUBAL LIGATION N/A     reopened tubes    HX TUBAL LIGATION      HX UROLOGICAL  2017    s/p Placement of bilateral double-J stents - 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        Social History     Socioeconomic History    Marital status:      Spouse name: Not on file    Number of children: Not on file    Years of education: Not on file    Highest education level: Not on file   Occupational History    Not on file   Social Needs    Financial resource strain: Not on file    Food insecurity:     Worry: Not on file     Inability: Not on file    Transportation needs:     Medical: Not on file     Non-medical: Not on file   Tobacco Use    Smoking status: Former Smoker     Last attempt to quit: 2017     Years since quittin.6    Smokeless tobacco: Never Used   Substance and Sexual Activity    Alcohol use: No    Drug use: No    Sexual activity: Not on file   Lifestyle    Physical activity: Days per week: Not on file     Minutes per session: Not on file    Stress: Not on file   Relationships    Social connections:     Talks on phone: Not on file     Gets together: Not on file     Attends Cheondoism service: Not on file     Active member of club or organization: Not on file     Attends meetings of clubs or organizations: Not on file     Relationship status: Not on file    Intimate partner violence:     Fear of current or ex partner: Not on file     Emotionally abused: Not on file     Physically abused: Not on file     Forced sexual activity: Not on file   Other Topics Concern    Not on file   Social History Narrative    ** Merged History Encounter **            Visit Vitals  /63   Pulse 79   Temp 97.8 °F (36.6 °C) (Oral)   Resp 17   Ht 5' 5\" (1.651 m)   Wt 206 lb (93.4 kg)   SpO2 98%   BMI 34.28 kg/m²         PHYSICAL EXAMINATION:  GENERAL: Alert and oriented x3, in no acute distress, well-developed, well-nourished, afebrile. HEART: No JVD. EYES: No scleral icterus   NECK: No significant lymphadenopathy   LUNGS: No respiratory compromise or indrawing  ABDOMEN: Soft, non-tender, non-distended. Electronically signed by:  Demetrius Schwartz MD

## 2019-09-17 DIAGNOSIS — Z96.641 STATUS POST TOTAL REPLACEMENT OF RIGHT HIP: ICD-10-CM

## 2019-09-18 RX ORDER — MELOXICAM 15 MG/1
TABLET ORAL
Qty: 90 TAB | Refills: 0 | Status: SHIPPED | OUTPATIENT
Start: 2019-09-18 | End: 2020-01-07

## 2020-01-07 DIAGNOSIS — Z96.641 STATUS POST TOTAL REPLACEMENT OF RIGHT HIP: ICD-10-CM

## 2020-01-07 RX ORDER — MELOXICAM 15 MG/1
TABLET ORAL
Qty: 90 TAB | Refills: 0 | Status: SHIPPED | OUTPATIENT
Start: 2020-01-07 | End: 2020-08-03

## 2020-04-30 NOTE — PHYSICIAN ADVISORY
Letter of admission status determination     Elina Rosales   Age: 81 y.o. MRN: [id number]  Admitting physician: Brielle Logan  Insurance: Payor: [place] MEDICARE / Plan: VA MEDICARE PART A & B / Product Type: Medicare /     Date of admission:  4/29/2019    I have reviewed this case as it involves a Medicare patient not meeting criteria for Inpatient services. The patient underwent elective total knee arthroplasty yesterday. The procedure is not on the current Medicare Inpatient Only List.  The patient tolerated the procedure well, without any documented complications. There were no unexpected complications to warrant Observation or Inpatient status. A discharge order was placed today. There is no indication that patient's hospital care will extend to the POD #2 or that SNF rehab is anticipated. Patient's modest burden of comorbidities does not increase the operative risk enough to justify Inpatient status. Therefore, Outpatient status is appropriate. The final decision regarding the patient's hospitalization status depends on the attending physician's judgment.        Patrick Dodson MD, [place], [address] [facility], [address] [place] [address]  [phone] calm

## 2020-07-31 DIAGNOSIS — Z96.641 STATUS POST TOTAL REPLACEMENT OF RIGHT HIP: ICD-10-CM

## 2020-08-03 RX ORDER — MELOXICAM 15 MG/1
TABLET ORAL
Qty: 90 TAB | Refills: 0 | Status: SHIPPED | OUTPATIENT
Start: 2020-08-03 | End: 2020-10-26

## 2020-10-24 DIAGNOSIS — Z96.641 STATUS POST TOTAL REPLACEMENT OF RIGHT HIP: ICD-10-CM

## 2020-10-26 RX ORDER — MELOXICAM 15 MG/1
TABLET ORAL
Qty: 90 TAB | Refills: 0 | Status: SHIPPED | OUTPATIENT
Start: 2020-10-26 | End: 2020-12-22

## 2020-12-22 DIAGNOSIS — Z96.641 STATUS POST TOTAL REPLACEMENT OF RIGHT HIP: ICD-10-CM

## 2020-12-22 RX ORDER — MELOXICAM 15 MG/1
TABLET ORAL
Qty: 90 TAB | Refills: 0 | Status: SHIPPED | OUTPATIENT
Start: 2020-12-22

## 2020-12-29 ENCOUNTER — TELEPHONE (OUTPATIENT)
Dept: ORTHOPEDIC SURGERY | Age: 70
End: 2020-12-29

## 2020-12-31 ENCOUNTER — OFFICE VISIT (OUTPATIENT)
Dept: ORTHOPEDIC SURGERY | Age: 70
End: 2020-12-31
Payer: MEDICARE

## 2020-12-31 VITALS
HEART RATE: 77 BPM | HEIGHT: 65 IN | DIASTOLIC BLOOD PRESSURE: 62 MMHG | SYSTOLIC BLOOD PRESSURE: 134 MMHG | BODY MASS INDEX: 34.66 KG/M2 | RESPIRATION RATE: 16 BRPM | WEIGHT: 208 LBS | OXYGEN SATURATION: 99 % | TEMPERATURE: 96.8 F

## 2020-12-31 DIAGNOSIS — G89.29 CHRONIC PAIN OF BOTH SHOULDERS: ICD-10-CM

## 2020-12-31 DIAGNOSIS — M25.511 CHRONIC PAIN OF BOTH SHOULDERS: ICD-10-CM

## 2020-12-31 DIAGNOSIS — M12.812 ROTATOR CUFF ARTHROPATHY OF BOTH SHOULDERS: Primary | ICD-10-CM

## 2020-12-31 DIAGNOSIS — M25.512 CHRONIC PAIN OF BOTH SHOULDERS: ICD-10-CM

## 2020-12-31 DIAGNOSIS — M12.811 ROTATOR CUFF ARTHROPATHY OF BOTH SHOULDERS: Primary | ICD-10-CM

## 2020-12-31 PROCEDURE — 3017F COLORECTAL CA SCREEN DOC REV: CPT | Performed by: ORTHOPAEDIC SURGERY

## 2020-12-31 PROCEDURE — 99214 OFFICE O/P EST MOD 30 MIN: CPT | Performed by: ORTHOPAEDIC SURGERY

## 2020-12-31 PROCEDURE — G8399 PT W/DXA RESULTS DOCUMENT: HCPCS | Performed by: ORTHOPAEDIC SURGERY

## 2020-12-31 PROCEDURE — G8536 NO DOC ELDER MAL SCRN: HCPCS | Performed by: ORTHOPAEDIC SURGERY

## 2020-12-31 PROCEDURE — G8427 DOCREV CUR MEDS BY ELIG CLIN: HCPCS | Performed by: ORTHOPAEDIC SURGERY

## 2020-12-31 PROCEDURE — 1090F PRES/ABSN URINE INCON ASSESS: CPT | Performed by: ORTHOPAEDIC SURGERY

## 2020-12-31 PROCEDURE — G8419 CALC BMI OUT NRM PARAM NOF/U: HCPCS | Performed by: ORTHOPAEDIC SURGERY

## 2020-12-31 PROCEDURE — 20611 DRAIN/INJ JOINT/BURSA W/US: CPT | Performed by: ORTHOPAEDIC SURGERY

## 2020-12-31 PROCEDURE — G8510 SCR DEP NEG, NO PLAN REQD: HCPCS | Performed by: ORTHOPAEDIC SURGERY

## 2020-12-31 PROCEDURE — 1101F PT FALLS ASSESS-DOCD LE1/YR: CPT | Performed by: ORTHOPAEDIC SURGERY

## 2020-12-31 PROCEDURE — 73030 X-RAY EXAM OF SHOULDER: CPT | Performed by: ORTHOPAEDIC SURGERY

## 2020-12-31 RX ORDER — TRIAMCINOLONE ACETONIDE 40 MG/ML
80 INJECTION, SUSPENSION INTRA-ARTICULAR; INTRAMUSCULAR ONCE
Status: COMPLETED | OUTPATIENT
Start: 2020-12-31 | End: 2020-12-31

## 2020-12-31 RX ORDER — MELOXICAM 15 MG/1
15 TABLET ORAL
Qty: 30 TAB | Refills: 1 | Status: SHIPPED | OUTPATIENT
Start: 2020-12-31

## 2020-12-31 RX ADMIN — TRIAMCINOLONE ACETONIDE 80 MG: 40 INJECTION, SUSPENSION INTRA-ARTICULAR; INTRAMUSCULAR at 11:32

## 2020-12-31 NOTE — PROGRESS NOTES
Sunita Smith  1950   Chief Complaint   Patient presents with    Shoulder Pain     bilateral shoulder pain        HISTORY OF PRESENT ILLNESS  Elina Putnam is a 78 y.o. female who presents today for evaluation of b/l shoulder pain. She rates her pain 7/10 today. Pain has been present for awhile but she also fell towards the end of August which exacerbated the pain. Had difficulty with raising the left arm immediately after the fall. ROM for the left shoulder improved after two weeks but then her right shoulder started to hurt. Pain has gotten progressively worse. Pt describes an aching pain and reports cracking sensation. Cracking sensation is sometimes associated with pain. Having night pain. Reports limited ROM and weakness. Patient describes the pain as aching and sharp that is Constant in nature. Symptoms are worse with certain movements, Activity and is better with  nothing. Associated symptoms include weakness. Since problem started, it: has worsened slightly. Pain does wake patient up at night. Has taken no meds for the problem. 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)     RLQ - leakage from ileostomy    Arthritis     BMI 31.0-31.9,adult     31.8    Chronic pain     right leg pain; spine and hip down to ankle    Decreased exercise tolerance     mets <4 (SOB)    Depression     Diverticulitis     Fistula of enterostomy (HCC)     Hypertension     Incisional hernia     Sciatic pain     SOB (shortness of breath)       Social History     Socioeconomic History    Marital status:      Spouse name: Not on file    Number of children: Not on file    Years of education: Not on file    Highest education level: Not on file Occupational History    Not on file   Social Needs    Financial resource strain: Not on file    Food insecurity     Worry: Not on file     Inability: Not on file    Transportation needs     Medical: Not on file     Non-medical: Not on file   Tobacco Use    Smoking status: Former Smoker     Quit date: 2017     Years since quittin.0    Smokeless tobacco: Never Used   Substance and Sexual Activity    Alcohol use: No    Drug use: No    Sexual activity: Not on file   Lifestyle    Physical activity     Days per week: Not on file     Minutes per session: Not on file    Stress: Not on file   Relationships    Social connections     Talks on phone: Not on file     Gets together: Not on file     Attends Anabaptist service: Not on file     Active member of club or organization: Not on file     Attends meetings of clubs or organizations: Not on file     Relationship status: Not on file    Intimate partner violence     Fear of current or ex partner: Not on file     Emotionally abused: Not on file     Physically abused: Not on file     Forced sexual activity: Not on file   Other Topics Concern    Not on file   Social History Narrative    ** Merged History Encounter **           Past Surgical History:   Procedure Laterality Date    FLEXIBLE SIGMOIDOSCOPY N/A 2017    FLEXIBLE SIGMOIDOSCOPY  performed by Shala Cordoba MD at [address] HX CHOLECYSTECTOMY  2018    HX GI  2017    reversal of ileostomy    HX HEENT      tonsillectomy    HX HERNIA REPAIR      HX KNEE REPLACEMENT N/A     right elbow repair    HX KNEE REPLACEMENT Right     HX OTHER SURGICAL  2017    reversal of ostomy     HX SMALL BOWEL RESECTION      ileostomy formation    HX QUENTIN AND BSO      HX TONSILLECTOMY      HX TUBAL LIGATION N/A     reopened tubes    HX TUBAL LIGATION      HX UROLOGICAL  2017    s/p Placement of bilateral double-J stents - 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  aspirin delayed-release 325 mg tablet Take 1 Tab by mouth two (2) times a day.  ascorbic acid, vitamin C, (VITAMIN C) 1,000 mg tablet Take 1,000 mg by mouth daily.  magnesium 250 mg tab Take  by mouth daily.  lisinopril (PRINIVIL, ZESTRIL) 20 mg tablet Take  by mouth daily.  acetaminophen (TYLENOL) 650 mg TbER Take 650 mg by mouth every eight (8) hours. Takes 1 or 2 as needed  Indications: Pain associated with Arthritis    potassium chloride (KLOR-CON M20) 20 mEq tablet Take  by mouth daily.  furosemide (LASIX) 20 mg tablet Take  by mouth two (2) times a day. Indications: Edema    cyanocobalamin (VITAMIN B-12) 1,000 mcg tablet Take 1,000 mcg by mouth daily.  vitamin E (AQUA GEMS) 400 unit capsule Take 800 Units by mouth daily.  meloxicam (MOBIC) 15 mg tablet Take 1 tablet by mouth once daily with food    meloxicam (MOBIC) 15 mg tablet Take 15 mg by mouth daily.  oxyCODONE-acetaminophen (PERCOCET) 5-325 mg per tablet Take 1-2 Tabs by mouth every six (6) hours as needed for Pain.  ferrous sulfate 325 mg (65 mg iron) tablet Take 1 Tab by mouth two (2) times daily (with meals).  Biotin 2,500 mcg cap Take  by mouth daily. Current Facility-Administered Medications   Medication Dose Route Frequency    triamcinolone acetonide (KENALOG-40) 40 mg/mL injection 80 mg  80 mg Intra artICUlar ONCE       REVIEW OF SYSTEM   Patient denies: Weight loss, Fever/Chills, HA, Visual changes, Fatigue, Chest pain, SOB, Abdominal pain, N/V/D/C, Blood in stool or urine, Edema. Pertinent positive as above in HPI.  All others were negative    PHYSICAL EXAM:   Visit Vitals  /62 (BP 1 Location: Left arm, BP Patient Position: Sitting)   Pulse 77   Temp 96.8 °F (36 °C) (Temporal)   Resp 16   Ht 5' 5\" (1.651 m)   Wt 208 lb (94.3 kg)   SpO2 99%   BMI 34.61 kg/m²     The patient is a well-developed, well-nourished female   in no acute distress. The patient is alert and oriented times three. The patient is alert and oriented times three. Mood and affect are normal.  LYMPHATIC: lymph nodes are not enlarged and are within normal limits  SKIN: normal in color and non tender to palpation. There are no bruises or abrasions noted. NEUROLOGICAL: Motor sensory exam is within normal limits. Reflexes are equal bilaterally. There is normal sensation to pinprick and light touch  MUSCULOSKELETAL:   Examination Left shoulder Right shoulder   Skin Intact Intact   AC joint tenderness - -   Biceps tenderness - -   Forward flexion/Elevation  120   Active abduction  111   Glenohumeral abduction 60 60   External rotation ROM 45 45   Internal rotation ROM 30 30   Apprehension - -   Josephs Relocation - -   Jerk - -   Load and Shift - -   Obriens - -   Speeds - -   Impingement sign + +   Supraspinatus/Empty Can + +   External Rotation Strength -, 5/5 -, 5/5   Lift Off/Belly Press -, 5/5 -, 5/5   Neurovascular Intact Intact       PROCEDURE: Bilateral Shoulder Injection with Ultrasound Guidance  Indication:Bilateral Shoulder pain/swelling    After sterile prep, 6 cc of Xylocaine and 1 cc of Kenalog were injected into the bilateral  shoulder. Ultrasound images captured using [address] [employer] Ultrasound machine and scanned into patient's chart.        [facility] - [facility]  OFFICE PROCEDURE PROGRESS NOTE        Chart reviewed for the following:  Oskar Mcfadden M.D, have reviewed the History, Physical and updated the Allergic reactions for Jolon A Syed [address] performed immediately prior to start of procedure:  Abilio Plata M.D, have performed the following reviews on [address] prior to the start of the procedure:            * Patient was identified by name and date of birth   * Agreement on procedure being performed was verified  * Risks and Benefits explained to the patient  * Procedure site verified and marked as necessary  * Patient was positioned for comfort  * Consent was signed and verified     Time: 11:22 AM     Date of procedure: 12/31/2020    Procedure performed by:  Enma Dave M.D    Provider assisted by: (see medication administration)    How tolerated by patient: tolerated the procedure well with no complications    Comments: none      IMAGING: XR of left shoulder obtained in the office dated 12/31/2020 was reviewed and read by Dr. Nadiya Martin: Proximal migration of the humeral head. Sclerotic changes in the greater tuberosity. XR of right shoulder obtained in the office dated 12/31/2020 was reviewed and read by Dr. Nadiya Martin: Proximal migration of the humeral head. Sclerotic changes in the greater tuberosity. IMPRESSION:      ICD-10-CM ICD-9-CM    1. Rotator cuff arthropathy of both shoulders  M12.811 716.81 triamcinolone acetonide (KENALOG-40) 40 mg/mL injection 80 mg    M12.812  ARTHROCENTESIS ASPIR&/INJ MAJOR JT/BURSA W/US      meloxicam (Mobic) 15 mg tablet   2. Chronic pain of both shoulders  M25.511 719.41 AMB POC XRAY, SHOULDER; COMPLETE, 2+    G89.29 338.29 AMB POC XRAY, SHOULDER; COMPLETE, 2+    M25.512          PLAN:  1. Pt presents today with b/l shoulder pain due to cuff tear arthropathy and I would like to try injecting both shoulders with cortisone today. Pt also requests a refill of Mobic today. Risk factors include: htn   2. No ultrasound exam indicated today  3. Yes cortisone injection indicated today B/L SHOULDER US  4. No Physical/Occupational Therapy indicated today  5. No diagnostic test indicated today:   6.  No durable medical equipment indicated today  7. No referral indicated today   8. Yes medications indicated today: MOBIC  9. No Narcotic indicated today      RTC 3 weeks if pain continues      Scribed by Daniel Bang) as dictated by Luiz Benson MD    I, Dr. Luiz Benson, confirm that all documentation is accurate.     Luiz Benson M.D.   [facility]

## 2021-01-25 NOTE — PROGRESS NOTES
Ranjeet Guerra  1950   Chief Complaint   Patient presents with    Shoulder Pain     bilateral shoulder pain        HISTORY OF PRESENT ILLNESS  Elina Minor is a 78 y.o. female who presents today for reevaluation of b/l shoulder. Patient rates pain as 3/10 today. Pain has been present for awhile but she also fell towards the end of August which exacerbated the pain. Had difficulty with raising the left arm immediately after the fall. ROM for the left shoulder improved after two weeks but then her right shoulder started to hurt. Pain has gotten progressively worse. Pt describes an aching pain and reports cracking sensation. Cracking sensation is sometimes associated with pain. Having night pain. Reports limited ROM and weakness. At last OV on 12/31/2020, patient had a b/l shoulder cortisone injection which provided some relief. Pain and clicking returned last week. Patient denies any fever, chills, chest pain, shortness of breath or calf pain. The remainder of the review of systems is negative. There are no new illness or injuries to report since last seen in the office. There are no changes to medications, allergies, family or social history. Pain Assessment  1/26/2021   Location of Pain Shoulder   Location Modifiers Right;Left   Severity of Pain 3   Quality of Pain Aching; Sharp   Quality of Pain Comment -   Duration of Pain A few minutes   Frequency of Pain Intermittent   Aggravating Factors (No Data)   Aggravating Factors Comment any movement   Limiting Behavior No   Relieving Factors Rest;Nothing   Relieving Factors Comment -   Result of Injury No     PHYSICAL EXAM:   Visit Vitals  /63 (BP 1 Location: Left arm, BP Patient Position: Sitting)   Pulse 82   Temp (!) 96.2 °F (35.7 °C) (Temporal)   Resp 16   Ht 5' 5\" (1.651 m)   Wt 207 lb (93.9 kg)   SpO2 99%   BMI 34.45 kg/m²     The patient is a well-developed, well-nourished female   in no acute distress.   The patient is alert and oriented times three. The patient is alert and oriented times three. Mood and affect are normal.  LYMPHATIC: lymph nodes are not enlarged and are within normal limits  SKIN: normal in color and non tender to palpation. There are no bruises or abrasions noted. NEUROLOGICAL: Motor sensory exam is within normal limits. Reflexes are equal bilaterally. There is normal sensation to pinprick and light touch  MUSCULOSKELETAL:  Examination Left shoulder Right shoulder   Skin Intact Intact   AC joint tenderness - -   Biceps tenderness - -   Forward flexion/Elevation  120   Active abduction  111   Glenohumeral abduction 60 60   External rotation ROM 45 45   Internal rotation ROM 30 30   Apprehension - -   Josephs Relocation - -   Jerk - -   Load and Shift - -   Obriens - -   Speeds - -   Impingement sign + +   Supraspinatus/Empty Can + +   External Rotation Strength -, 5/5 -, 5/5   Lift Off/Belly Press -, 5/5 -, 5/5   Neurovascular Intact Intact        IMAGING: XR of left shoulder obtained in the office dated 12/31/2020 was reviewed and read by Dr. Sherri Blanchard: Proximal migration of the humeral head. Sclerotic changes in the greater tuberosity.           XR of right shoulder obtained in the office dated 12/31/2020 was reviewed and read by Dr. Sherri Blanchard: Proximal migration of the humeral head. Sclerotic changes in the greater tuberosity. IMPRESSION:      ICD-10-CM ICD-9-CM    1. Rotator cuff arthropathy of both shoulders  M12.811 716.81     M12.812          PLAN:   1. Pt presents today with b/l shoulder pain due to cuff tear arthropathy and the cortisone injections given at last OV did not provide lasting relief. An MRI was discussed with the patient today but she feels her pain is not severe enough to move forward with getting an MRI at this time. She does not want surgery at this time. Discussed with her that we should ideally wait 3-4 months in between cortisone injections. Risk factors include: htn  2. No ultrasound exam indicated today  3. No cortisone injection indicated today   4. No Physical/Occupational Therapy indicated today  5. No diagnostic test indicated today:   6. No durable medical equipment indicated today  7. No referral indicated today   8. No medications indicated today:   9. No Narcotic indicated today      RTC prn      Scribed by [address]) as dictated by Radha Parker MD    I, Dr. Radha Parker, confirm that all documentation is accurate.     Radha Parker M.D.   [facility]

## 2021-01-26 ENCOUNTER — OFFICE VISIT (OUTPATIENT)
Dept: ORTHOPEDIC SURGERY | Age: 71
End: 2021-01-26
Payer: MEDICARE

## 2021-01-26 VITALS
SYSTOLIC BLOOD PRESSURE: 132 MMHG | HEART RATE: 82 BPM | TEMPERATURE: 96.2 F | BODY MASS INDEX: 34.49 KG/M2 | HEIGHT: 65 IN | OXYGEN SATURATION: 99 % | RESPIRATION RATE: 16 BRPM | WEIGHT: 207 LBS | DIASTOLIC BLOOD PRESSURE: 63 MMHG

## 2021-01-26 DIAGNOSIS — M12.811 ROTATOR CUFF ARTHROPATHY OF BOTH SHOULDERS: Primary | ICD-10-CM

## 2021-01-26 DIAGNOSIS — M12.812 ROTATOR CUFF ARTHROPATHY OF BOTH SHOULDERS: Primary | ICD-10-CM

## 2021-01-26 PROCEDURE — 99212 OFFICE O/P EST SF 10 MIN: CPT | Performed by: ORTHOPAEDIC SURGERY

## 2021-01-26 PROCEDURE — G8417 CALC BMI ABV UP PARAM F/U: HCPCS | Performed by: ORTHOPAEDIC SURGERY

## 2021-01-26 PROCEDURE — 1090F PRES/ABSN URINE INCON ASSESS: CPT | Performed by: ORTHOPAEDIC SURGERY

## 2021-01-26 PROCEDURE — 1101F PT FALLS ASSESS-DOCD LE1/YR: CPT | Performed by: ORTHOPAEDIC SURGERY

## 2021-01-26 PROCEDURE — G8427 DOCREV CUR MEDS BY ELIG CLIN: HCPCS | Performed by: ORTHOPAEDIC SURGERY

## 2021-01-26 PROCEDURE — G8399 PT W/DXA RESULTS DOCUMENT: HCPCS | Performed by: ORTHOPAEDIC SURGERY

## 2021-01-26 PROCEDURE — G8536 NO DOC ELDER MAL SCRN: HCPCS | Performed by: ORTHOPAEDIC SURGERY

## 2021-01-26 PROCEDURE — 3017F COLORECTAL CA SCREEN DOC REV: CPT | Performed by: ORTHOPAEDIC SURGERY

## 2021-01-26 PROCEDURE — G8510 SCR DEP NEG, NO PLAN REQD: HCPCS | Performed by: ORTHOPAEDIC SURGERY

## 2021-01-28 ENCOUNTER — OFFICE VISIT (OUTPATIENT)
Dept: ORTHOPEDIC SURGERY | Age: 71
End: 2021-01-28
Payer: MEDICARE

## 2021-01-28 VITALS
DIASTOLIC BLOOD PRESSURE: 71 MMHG | HEART RATE: 100 BPM | SYSTOLIC BLOOD PRESSURE: 125 MMHG | OXYGEN SATURATION: 98 % | TEMPERATURE: 97.3 F | HEIGHT: 65 IN | BODY MASS INDEX: 33.82 KG/M2 | RESPIRATION RATE: 16 BRPM | WEIGHT: 203 LBS

## 2021-01-28 DIAGNOSIS — Z96.652 HISTORY OF LEFT KNEE REPLACEMENT: Primary | ICD-10-CM

## 2021-01-28 DIAGNOSIS — Z96.651 HISTORY OF RIGHT KNEE JOINT REPLACEMENT: ICD-10-CM

## 2021-01-28 DIAGNOSIS — Z96.641 HISTORY OF RIGHT HIP REPLACEMENT: ICD-10-CM

## 2021-01-28 DIAGNOSIS — M25.511 CHRONIC PAIN OF BOTH SHOULDERS: ICD-10-CM

## 2021-01-28 DIAGNOSIS — M25.512 CHRONIC PAIN OF BOTH SHOULDERS: ICD-10-CM

## 2021-01-28 DIAGNOSIS — G89.29 CHRONIC PAIN OF BOTH SHOULDERS: ICD-10-CM

## 2021-01-28 PROCEDURE — 1101F PT FALLS ASSESS-DOCD LE1/YR: CPT | Performed by: ORTHOPAEDIC SURGERY

## 2021-01-28 PROCEDURE — 73502 X-RAY EXAM HIP UNI 2-3 VIEWS: CPT | Performed by: ORTHOPAEDIC SURGERY

## 2021-01-28 PROCEDURE — G8399 PT W/DXA RESULTS DOCUMENT: HCPCS | Performed by: ORTHOPAEDIC SURGERY

## 2021-01-28 PROCEDURE — 3017F COLORECTAL CA SCREEN DOC REV: CPT | Performed by: ORTHOPAEDIC SURGERY

## 2021-01-28 PROCEDURE — G8427 DOCREV CUR MEDS BY ELIG CLIN: HCPCS | Performed by: ORTHOPAEDIC SURGERY

## 2021-01-28 PROCEDURE — G8536 NO DOC ELDER MAL SCRN: HCPCS | Performed by: ORTHOPAEDIC SURGERY

## 2021-01-28 PROCEDURE — G8432 DEP SCR NOT DOC, RNG: HCPCS | Performed by: ORTHOPAEDIC SURGERY

## 2021-01-28 PROCEDURE — 1090F PRES/ABSN URINE INCON ASSESS: CPT | Performed by: ORTHOPAEDIC SURGERY

## 2021-01-28 PROCEDURE — G8417 CALC BMI ABV UP PARAM F/U: HCPCS | Performed by: ORTHOPAEDIC SURGERY

## 2021-01-28 PROCEDURE — 99214 OFFICE O/P EST MOD 30 MIN: CPT | Performed by: ORTHOPAEDIC SURGERY

## 2021-01-28 PROCEDURE — 73562 X-RAY EXAM OF KNEE 3: CPT | Performed by: ORTHOPAEDIC SURGERY

## 2021-01-28 RX ORDER — MELOXICAM 15 MG/1
15 TABLET ORAL DAILY
Qty: 30 TAB | Refills: 3 | Status: SHIPPED | OUTPATIENT
Start: 2021-01-28

## 2021-01-28 NOTE — PROGRESS NOTES
Patient: Desirae Mayer                MRN: [id number]       SSN: [id number]  YOB: 1950        AGE: 78 y.o. SEX: female  Body mass index is 33.78 kg/m². PCP: Souleymane Martinez MD  01/28/21    HISTORY:  I had the pleasure of reviewing Ms. Zia Nolsaco today. As you know, she has done very well with her left knee replacement that we fixed for her. The right one has been in for over 20 years and we are watching this. It has an LCS with a metal backed patella. It will eventually require some revision. She denies fevers or chills and otherwise has been feeling well. As you know, she did have a trochanteric fracture after surgery and was treated nonoperatively, as per her request, and she reports really no pain in the hip whatsoever. Some days she walks with a trendelenburg gait and other weeks not. It is non antalgic, non painful and she is actually quite pleased with the hip itself. PHYSICAL EXAMINATION:  I did examine her today. She does walk with a trendelenburg gait, actually bilaterally. The knees themselves are noncontributory today and both hips rotate well. No tenderness. Well healed incision. Calf non tender. Karissa Morales' sign is negative. Dr. Seema Henderson is seeing her for her shoulders. I did specifically test her abduction strength and her abduction strength is actually quite excellent, even on the hip replacement side as well. RADIOGRAPHS:  X-rays today, AP pelvis and AP and lateral of the right hip show the implants are well aligned and well fixed. She did have some trochanteric escape, which has healed, which displaced about a centimeter or so. The x-rays of the knee show an LCS on the right, well fixed, and a Triathlon on the left. PLAN:  I think she would benefit from some gait training with physical therapy. She does not really want to do that right now. She is otherwise very happy with the hip itself. I would like to see her back in a year's time. She is going to follow up with Dr. Olsen with regards to her shoulders.  I do suspect she will eventually need some shoulder surgery, but the injections are helping quite nicely at this time.  We will see her back in a year's time and I wanted to again thank you for allowing me to share in her care.    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)     RLQ - leakage from ileostomy   • Arthritis    • BMI 31.0-31.9,adult     31.8   • Chronic pain     right leg pain; spine and hip down to ankle   • Decreased exercise tolerance     mets <4 (SOB)   • Depression    • Diverticulitis    • Fistula of enterostomy (HCC)    • Hypertension    • Incisional hernia    • Sciatic pain    • SOB (shortness of breath)        Family History   Problem Relation Age of Onset   • Heart Disease Mother    • Heart Attack Mother    • Other Mother    • No Known Problems Other    • Other Sister         hip dysplasia   • Other Daughter         hip dysplasia       Current Outpatient Medications   Medication Sig Dispense Refill   • meloxicam (Mobic) 15 mg tablet Take 1 Tab by mouth daily (with breakfast). 30 Tab 1   • meloxicam (MOBIC) 15 mg tablet Take 1 tablet by mouth once daily with food 90 Tab 0   • meloxicam (MOBIC) 15 mg tablet Take 15 mg by mouth daily.     • oxyCODONE-acetaminophen (PERCOCET) 5-325 mg per tablet Take 1-2 Tabs by mouth every six (6) hours as needed for Pain.     • aspirin delayed-release 325 mg tablet Take 1 Tab by mouth two (2) times a day. 60 Tab 1   • ferrous sulfate 325 mg (65 mg iron) tablet Take 1 Tab by mouth two (2) times daily (with meals). 60 Tab 1   • Biotin 2,500 mcg cap  Take  by mouth daily.  ascorbic acid, vitamin C, (VITAMIN C) 1,000 mg tablet Take 1,000 mg by mouth daily.  magnesium 250 mg tab Take  by mouth daily.  lisinopril (PRINIVIL, ZESTRIL) 20 mg tablet Take  by mouth daily.  acetaminophen (TYLENOL) 650 mg TbER Take 650 mg by mouth every eight (8) hours. Takes 1 or 2 as needed  Indications: Pain associated with Arthritis      potassium chloride (KLOR-CON M20) 20 mEq tablet Take  by mouth daily.  furosemide (LASIX) 20 mg tablet Take  by mouth two (2) times a day. Indications: Edema      cyanocobalamin (VITAMIN B-12) 1,000 mcg tablet Take 1,000 mcg by mouth daily.  vitamin E (AQUA GEMS) 400 unit capsule Take 800 Units by mouth daily.          Allergies   Allergen Reactions    Ancef [Cefazolin] Itching and Hives    Cefazolin Hives       Past Surgical History:   Procedure Laterality Date    FLEXIBLE SIGMOIDOSCOPY N/A 4/26/2017    FLEXIBLE SIGMOIDOSCOPY  performed by Dl Shaw MD at [address] HX CHOLECYSTECTOMY  04/2018    HX GI  04/2017    reversal of ileostomy    HX HEENT      tonsillectomy    HX HERNIA REPAIR      HX KNEE REPLACEMENT N/A     right elbow repair    HX KNEE REPLACEMENT Right     HX OTHER SURGICAL  03/2017    reversal of ostomy     HX SMALL BOWEL RESECTION      ileostomy formation    HX QUENTIN AND BSO      HX TONSILLECTOMY      HX TUBAL LIGATION N/A     reopened tubes    HX TUBAL LIGATION      HX UROLOGICAL  03/01/2017    s/p Placement of bilateral double-J stents - 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        Social History     Socioeconomic History    Marital status:      Spouse name: Not on file    Number of children: Not on file    Years of education: Not on file    Highest education level: Not on file   Occupational History    Not on file   Social Needs    Financial resource strain: Not on file    Food insecurity     Worry: Not on file     Inability: Not on file   [employer] needs     Medical: Not on file     Non-medical: Not on file   Tobacco Use    Smoking status: Former Smoker     Quit date: 2017     Years since quittin.0    Smokeless tobacco: Never Used   Substance and Sexual Activity    Alcohol use: No    Drug use: No    Sexual activity: Not on file   Lifestyle    Physical activity     Days per week: Not on file     Minutes per session: Not on file    Stress: Not on file   Relationships    Social connections     Talks on phone: Not on file     Gets together: Not on file     Attends Sikh service: Not on file     Active member of club or organization: Not on file     Attends meetings of clubs or organizations: Not on file     Relationship status: Not on file    Intimate partner violence     Fear of current or ex partner: Not on file     Emotionally abused: Not on file     Physically abused: Not on file     Forced sexual activity: Not on file   Other Topics Concern    Not on file   Social History Narrative    ** Merged History Encounter **            Visit Vitals  /71 (BP 1 Location: Left upper arm, BP Patient Position: Sitting)   Pulse 100   Temp 97.3 °F (36.3 °C) (Temporal)   Resp 16   Ht 5' 5\" (1.651 m)   Wt 92.1 kg (203 lb)   SpO2 98%   BMI 33.78 kg/m²         PHYSICAL EXAMINATION:  GENERAL: Alert and oriented x3, in no acute distress, well-developed, well-nourished, afebrile. HEART: No JVD. EYES: No scleral icterus   NECK: No significant lymphadenopathy   LUNGS: No respiratory compromise or indrawing  ABDOMEN: Soft, non-tender, non-distended. Electronically signed by:  Radha Vines MD

## 2022-01-12 ENCOUNTER — OFFICE VISIT (OUTPATIENT)
Dept: ORTHOPEDIC SURGERY | Age: 72
End: 2022-01-12
Payer: MEDICARE

## 2022-01-12 VITALS
WEIGHT: 203 LBS | BODY MASS INDEX: 33.82 KG/M2 | OXYGEN SATURATION: 99 % | HEIGHT: 65 IN | HEART RATE: 78 BPM | TEMPERATURE: 97.7 F

## 2022-01-12 DIAGNOSIS — M81.0 POST-MENOPAUSAL OSTEOPOROSIS: ICD-10-CM

## 2022-01-12 DIAGNOSIS — M25.562 CHRONIC PAIN OF LEFT KNEE: Primary | ICD-10-CM

## 2022-01-12 DIAGNOSIS — G89.29 CHRONIC PAIN OF LEFT KNEE: Primary | ICD-10-CM

## 2022-01-12 DIAGNOSIS — Z96.651 HISTORY OF RIGHT KNEE JOINT REPLACEMENT: ICD-10-CM

## 2022-01-12 DIAGNOSIS — M25.551 CHRONIC RIGHT HIP PAIN: ICD-10-CM

## 2022-01-12 DIAGNOSIS — Z96.652 HISTORY OF LEFT KNEE REPLACEMENT: ICD-10-CM

## 2022-01-12 DIAGNOSIS — G89.29 CHRONIC RIGHT HIP PAIN: ICD-10-CM

## 2022-01-12 DIAGNOSIS — Z96.641 HISTORY OF RIGHT HIP REPLACEMENT: ICD-10-CM

## 2022-01-12 PROCEDURE — 73562 X-RAY EXAM OF KNEE 3: CPT | Performed by: ORTHOPAEDIC SURGERY

## 2022-01-12 PROCEDURE — G8536 NO DOC ELDER MAL SCRN: HCPCS | Performed by: ORTHOPAEDIC SURGERY

## 2022-01-12 PROCEDURE — G8417 CALC BMI ABV UP PARAM F/U: HCPCS | Performed by: ORTHOPAEDIC SURGERY

## 2022-01-12 PROCEDURE — 72170 X-RAY EXAM OF PELVIS: CPT | Performed by: ORTHOPAEDIC SURGERY

## 2022-01-12 PROCEDURE — 3017F COLORECTAL CA SCREEN DOC REV: CPT | Performed by: ORTHOPAEDIC SURGERY

## 2022-01-12 PROCEDURE — G8510 SCR DEP NEG, NO PLAN REQD: HCPCS | Performed by: ORTHOPAEDIC SURGERY

## 2022-01-12 PROCEDURE — 1090F PRES/ABSN URINE INCON ASSESS: CPT | Performed by: ORTHOPAEDIC SURGERY

## 2022-01-12 PROCEDURE — G8427 DOCREV CUR MEDS BY ELIG CLIN: HCPCS | Performed by: ORTHOPAEDIC SURGERY

## 2022-01-12 PROCEDURE — 1101F PT FALLS ASSESS-DOCD LE1/YR: CPT | Performed by: ORTHOPAEDIC SURGERY

## 2022-01-12 PROCEDURE — 99214 OFFICE O/P EST MOD 30 MIN: CPT | Performed by: ORTHOPAEDIC SURGERY

## 2022-01-12 NOTE — PROGRESS NOTES
Patient: Ingrid Lugo                MRN: [id number]       SSN: [id number]  YOB: 1950        AGE: 73 y.o. SEX: female  Body mass index is 33.78 kg/m². PCP: Edward Bobo MD  01/12/22    Mrs. Giovanni Eid presents today for reevaluation of bilateral knees right hip replacement left hip overall she is pleased with her hip replacement she did end up with a fracture involving her trochanter which was treated nonoperatively at her request and occasionally some left knee pain but very rarely she is actually off Mobic for the most part the right knee replacement was done by Dr. Vadim Bell about 20 years ago with some LCS metal-backed patella were following this overall she is pleased with things her family does note that she does have an abnormal gait pattern she denies any start up pain denies any groin or thigh discomfort does not need her hip really hurts her her low back has been actually fairly quiescent examination today she is very pleasant both hips rotate nicely the left hip with the nonoperative side has about 85% normal abduction strength on the right side she is a little bit less nontender over the trochanters back nontender each knee is performing adequately is a little bit of mid flexion instability on the right side the left knee is is a triathlon constrained and is doing very nicely with good motion quads tendon is nontender she has full motion very good stability.     X-rays today including a 4 views of the left knee impression 3 views of the left knee and AP pelvis on 1/12/2022 confirm that the hip replacement is in good position alignment the trochanter healed about 4 mm displaced but looks like it has healed on the right side on the left side she has a large and in these uropathy osteophyte over the trochanter and its on the nonoperative side    I think her gait pattern could be improved with some further exercising we did go over the abduction exercises and from ankle weights and she smiled and was going to think about it. At this point we should see her once a year for an x-ray for the right knee itself and I be very happy to organize some therapy I also would like to get a bone density test and we will see her back in 1 I did watch her walk she does have a bilateral Trendelenburg gait even from the nonoperative side    I will get a bone density test and see her back in follow-up its been absolute pleasure to share in her care and I do not recommend surgery currently for    REVIEW OF SYSTEMS:      CON: negative  EYE: negative   ENT: negative  RESP: negative  GI:    negative   :  negative  MSK: Positive  A twelve point review of systems was completed, positives noted and all other systems were reviewed and are negative          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)     RLQ - leakage from ileostomy    Arthritis     BMI 31.0-31.9,adult     31.8    Chronic pain     right leg pain; spine and hip down to ankle    Decreased exercise tolerance     mets <4 (SOB)    Depression     Diverticulitis     Fistula of enterostomy ([address]     Hypertension     Incisional hernia     Sciatic pain     SOB (shortness of breath)        Family History   Problem Relation Age of Onset    Heart Disease Mother     Heart Attack Mother     Other Mother     No Known Problems Other     Other Sister         hip dysplasia    Other Daughter         hip dysplasia       Current Outpatient Medications   Medication Sig Dispense Refill    aspirin delayed-release 325 mg tablet Take 1 Tab by mouth two (2) times a day. 60 Tab 1    ferrous sulfate 325 mg (65 mg iron) tablet Take 1 Tab by mouth two (2) times daily (with meals). 60 Tab 1    Biotin 2,500 mcg cap Take  by mouth daily.  ascorbic acid, vitamin C, (VITAMIN C) 1,000 mg tablet Take 1,000 mg by mouth daily.  magnesium 250 mg tab Take  by mouth daily.       lisinopril (PRINIVIL, ZESTRIL) 20 mg tablet Take  by mouth daily.  acetaminophen (TYLENOL) 650 mg TbER Take 650 mg by mouth every eight (8) hours. Takes 1 or 2 as needed  Indications: Pain associated with Arthritis      potassium chloride (KLOR-CON M20) 20 mEq tablet Take  by mouth daily.  furosemide (LASIX) 20 mg tablet Take  by mouth two (2) times a day. Indications: Edema      cyanocobalamin (VITAMIN B-12) 1,000 mcg tablet Take 1,000 mcg by mouth daily.  vitamin E (AQUA GEMS) 400 unit capsule Take 800 Units by mouth daily.  meloxicam (MOBIC) 15 mg tablet Take 1 Tab by mouth daily. (Patient not taking: Reported on 1/12/2022) 30 Tab 3    meloxicam (Mobic) 15 mg tablet Take 1 Tab by mouth daily (with breakfast). (Patient not taking: Reported on 1/12/2022) 30 Tab 1    meloxicam (MOBIC) 15 mg tablet Take 1 tablet by mouth once daily with food (Patient not taking: Reported on 1/12/2022) 90 Tab 0    meloxicam (MOBIC) 15 mg tablet Take 15 mg by mouth daily. (Patient not taking: Reported on 1/12/2022)      oxyCODONE-acetaminophen (PERCOCET) 5-325 mg per tablet Take 1-2 Tabs by mouth every six (6) hours as needed for Pain.  (Patient not taking: Reported on 1/12/2022)         Allergies   Allergen Reactions    Ancef [Cefazolin] Itching and Hives    Cefazolin Hives       Past Surgical History:   Procedure Laterality Date    FLEXIBLE SIGMOIDOSCOPY N/A 4/26/2017    FLEXIBLE SIGMOIDOSCOPY  performed by Lea Stone MD at [address] HX CHOLECYSTECTOMY  04/2018    HX GI  04/2017    reversal of ileostomy    HX HEENT      tonsillectomy    HX HERNIA REPAIR      HX KNEE REPLACEMENT N/A     right elbow repair    HX KNEE REPLACEMENT Right     HX OTHER SURGICAL  03/2017    reversal of ostomy     HX SMALL BOWEL RESECTION      ileostomy formation    HX QUENTIN AND BSO      HX TONSILLECTOMY      HX TUBAL LIGATION N/A     reopened tubes    HX TUBAL LIGATION      HX UROLOGICAL  03/01/2017    s/p Placement of bilateral double-J stents - 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        Social History     Socioeconomic History    Marital status:      Spouse name: Not on file    Number of children: Not on file    Years of education: Not on file    Highest education level: Not on file   Occupational History    Not on file   Tobacco Use    Smoking status: Former Smoker     Quit date: 2017     Years since quittin.0    Smokeless tobacco: Never Used   Vaping Use    Vaping Use: Never used   Substance and Sexual Activity    Alcohol use: No    Drug use: No    Sexual activity: Not on file   Other Topics Concern    Not on file   Social History Narrative    ** Merged History Encounter **          Social Determinants of Health     Financial Resource Strain:     Difficulty of Paying Living Expenses: Not on file   Food Insecurity:     Worried About [address] in the Last Year: Not on file    [address] [place] in the Last Year: Not on file   Transportation Needs:     Lack of Transportation (Medical): Not on file    Lack of Transportation (Non-Medical):  Not on file   Physical Activity:     Days of Exercise per Week: Not on file    Minutes of Exercise per Session: Not on file   Stress:     Feeling of Stress : Not on file   Social Connections:     Frequency of Communication with Friends and Family: Not on file    Frequency of Social Gatherings with Friends and Family: Not on file    Attends Jewish Services: Not on file    Active Member of Clubs or Organizations: Not on file    Attends Club or Organization Meetings: Not on file    Marital Status: Not on file   Intimate Partner Violence:     Fear of Current or Ex-Partner: Not on file    Emotionally Abused: Not on file    Physically Abused: Not on file    Sexually Abused: Not on file   Housing Stability:     Unable to Pay for Housing in the Last Year: Not on file    Number of [place] in the Last Year: Not on file    Unstable Housing in the Last Year: Not on file       Visit Vitals  Pulse 78   Temp 97.7 °F (36.5 °C) (Temporal)   Ht 5' 5\" (1.651 m)   Wt 203 lb (92.1 kg)   SpO2 99%   BMI 33.78 kg/m²         PHYSICAL EXAMINATION:  GENERAL: Alert and oriented x3, in no acute distress, well-developed, well-nourished, afebrile. HEART: No JVD. EYES: No scleral icterus   NECK: No significant lymphadenopathy   LUNGS: No respiratory compromise or indrawing  ABDOMEN: Soft, non-tender, non-distended. Note: This note was completed using voice recognition software. Any typographical/name errors or mistakes are unintentional.    Electronically signed by:  Glen Sweeney MD

## 2022-01-19 DIAGNOSIS — M81.0 POST-MENOPAUSAL OSTEOPOROSIS: ICD-10-CM

## 2022-01-26 ENCOUNTER — HOSPITAL ENCOUNTER (OUTPATIENT)
Dept: BONE DENSITY | Age: 72
Discharge: HOME | End: 2022-01-26
Attending: ORTHOPAEDIC SURGERY
Payer: MEDICARE

## 2022-01-26 PROCEDURE — 77080 DXA BONE DENSITY AXIAL: CPT

## 2022-03-18 PROBLEM — Z93.2 ILEOSTOMY STATUS (HCC): Status: ACTIVE | Noted: 2017-04-26

## 2022-03-18 PROBLEM — M16.10 HIP ARTHRITIS: Status: ACTIVE | Noted: 2018-07-10

## 2022-03-19 PROBLEM — N32.1 VESICOCOLIC FISTULA: Status: ACTIVE | Noted: 2017-03-01

## 2022-03-19 PROBLEM — K43.2 INCISIONAL HERNIA: Status: ACTIVE | Noted: 2017-08-11

## 2022-03-19 PROBLEM — M17.10 ARTHRITIS OF KNEE: Status: ACTIVE | Noted: 2019-04-29

## 2022-08-25 NOTE — BRIEF OP NOTE
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] with osteomalacia  Postoperative Diagnosis: Osteoarthritis of right hip, unspecified osteoarthritis type [M16.11]    Procedure(s):  RIGHT TOTAL HIP ARTHROPLASTY LATERAL APPROACH  Surgeon(s) and Role:     * Kimberly Rhoades MD - Primary         Surgical Assistant: Salvatore Bennett    Surgical Staff:  Circ-1: Darrell Valverde RN  Physician Assistant: Amanda Oscar PA-C  Scrub Tech-1: Amena Hernandez  Surg Asst-1: Manuel Faster  Event Time In   Incision Start 1108   Incision Close      Anesthesia: General   Estimated Blood Loss: 310ml  Specimens:   ID Type Source Tests Collected by Time Destination   1 : right femoral head Preservative Hip, right  Kimberly Rhoades MD 8/14/2018 1112 Pathology      Findings: same   Complications: none  Implants:   Implant Name Type Inv.  Item Serial No.  Lot No. LRB No. Used Action   PLUG APCL H ACET SHLL --  - [id number]  PLUG APCL H ACET SHLL --   [employer] HOW [id number] Right 1 Implanted   6.5MM LOW PROFILE HEX SCREW     [id number] Right 1 Implanted   6.5MM LOW PROFILE HEX SCREW     [id number] Right 1 Implanted   TRIDENT II TRITANIUM CLUSTERHOLE ACETABULAR SHELL     [id number] Right 1 Implanted   INSERT ACET ECC 0DEG 36MM E X3 --  - [id number]  INSERT ACET ECC 0DEG 36MM E X3 --   [employer] HOW [id number] Right 1 Implanted   STEM FEM SZ 5 [id number] -- ACCOLADE II V40 - [id number]  STEM FEM SZ 5 127D [id number] -- ACCOLADE II V40  [employer] HOW [id number] Right 1 Implanted   HEAD FEM DELT V40 -5MM NK 36MM -- V40 BIOLOX - [id number]   HEAD FEM DELT V40 -5MM NK 36MM -- V40 BIOLOX   [employer] HOW [id number] Right 1 Implanted
,

## 2023-03-15 NOTE — TELEPHONE ENCOUNTER
Left message for patient to return call. Please find out what questions she has when she calls back so we can ask  and Dr. Rhona Rice. 59 yo F w PMH of HLD presenting to  ED for further evaluation of sudden onset dizziness. Pt states that onset yesterday, she started experiencing worsening dizziness, but was able to work through her day. She went for a nap and upon wakening, she states her dizziness was worse, unable to get up from a chair and has various episodes of NBNB vomitus. Patient states she’s has on and off dizziness for the last few months, but never this intense. She called her PCP earlier today, tried Epley Maneuver at home and did not feel improvement. PCP advised her to present to ED. Pt states she feels slight improvement of her dizziness w antiemetics. To note, pt admits a recent URI infection, did not finish course of Augmentin but states symptoms resolved.  Dizziness worse w head movements, denies HA, vision changes, recent falls or trauma.      In ED: vitals and labs WNL. Neurology evaluated pt in ER – recommended admission for MRI. S/P 1L NS. Given Zofran and Meclizine. CT Angio - No acute infarction or intracranial hemorrhage or masses appreciated. No previous admissions.

## 2023-06-02 NOTE — PROGRESS NOTES
Patient: Geni Pruitt                MRN: [id number]       SSN: [id number]  YOB: 1950        AGE: 74 y.o. SEX: female  Body mass index is 33.37 kg/(m^2). 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 She is quite a character. She thinks she may have an ulcer, and she will need to have that verified prior to having surgery. I sent her for a bone scan. The bone scan actually looks pretty good. It is only about osteopenia, and she has a somewhat neglected hip with a raised superior hip migration, and we have discussed that she may always limp. She does suffer from back problems as well. We went her for an MRI of the back. She has multilevel degenerative disc disease. She does get sciatica down that leg as well according to her. PHYSICAL EXAMINATION:  On examination today, she walks very poorly with a Trendelenburg and antalgic gait owing to the right side. Her right knee is failing. Both feet are warm and well perfused. RADIOGRAPHS:  X-rays reviewed again confirm very severe end-staged arthritis of the hip with quite a badly worn acetabular area. PLAN:  We had a lengthy discussion regarding risks and benefits including surgery, including but not limited to infection, DVT, pulmonary embolism, anesthetic complications, blood loss requiring transfusion, pain, stiffness, and other complications such as dislocation, leg length discrepancy, and permanent limp as well. I think she will always have a mild gait abnormality but hopefully not. She would like to proceed. She will need to have medical clearance and a work up for possible ulcer in GI. We will see her back for a History and Physical as well. I kindly as for medical clearance.                REVIEW OF SYSTEMS:      CON: negative for weight loss, fever  EYE: negative for double vision  ENT: negative for hoarseness  RS:   negative for Tb  GI:    negative for blood in stool  :  negative for blood in urine  Other systems reviewed and noted below. 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)     RLQ - leakage from ileostomy    BMI 31.0-31.9,adult     31.8    Chronic pain     right leg pain; spine and hip down to ankle    Decreased exercise tolerance     mets <4 (SOB)    Depression     Diverticulitis     Fistula of enterostomy (HCC)     Incisional hernia     Sciatic pain     SOB (shortness of breath)        Family History   Problem Relation Age of Onset    Heart Disease Mother     Heart Attack Mother     Other Mother     No Known Problems Other     Other Sister      hip dysplasia    Other Daughter      hip dysplasia       Current Outpatient Prescriptions   Medication Sig Dispense Refill    potassium chloride (KLOR-CON M20) 20 mEq tablet Take  by mouth two (2) times a day.  furosemide (LASIX) 20 mg tablet Take  by mouth daily.  magnesium gluconate 500 mg (27 mg  elemental) tablet Take  by mouth two (2) times a day.  cyanocobalamin 1,000 mcg tablet Take 1,000 mcg by mouth daily.  cyanocobalamin (VITAMIN B-12) 1,000 mcg tablet Take 1,000 mcg by mouth daily.  VITAMIN E, DL,TOCOPHERYL ACET, (VITAMIN E ACETATE) 400 unit cap capsule Take  by mouth daily.  MULTIVITAMIN WITH IRON (CHILDREN'S VITAMIN WITH IRON PO) Take 1 Tab by mouth daily.  MAGNESIUM PO Take 1 Tab by mouth daily.  naproxen sodium (ALEVE) 220 mg tablet Take 440 mg by mouth daily.  acetaminophen (TYLENOL ARTHRITIS PAIN) 650 mg CR tablet Take 1,300 mg by mouth every six (6) hours as needed for Pain.  vitamin E (AQUA GEMS) 400 unit capsule Take 400 Units by mouth.  ascorbic acid, vitamin C, (VITAMIN C) 500 mg tablet 1 mg.  multivitamin with iron tablet Take 1 Tab by mouth daily.  potassium chloride (K-DUR, KLOR-CON) 20 mEq tablet Take  by mouth daily.       oxyCODONE-acetaminophen (PERCOCET) 5-325 mg per tablet Take 1 Tab by mouth every four (4) hours as needed for Pain. Max Daily Amount: 6 Tabs. 30 Tab 0    PSYLLIUM SEED, WITH DEXTROSE, (FIBER PO) Take 4 Caps by mouth daily.  furosemide (LASIX) 40 mg tablet Take 80 mg by mouth daily. Allergies   Allergen Reactions    Ancef [Cefazolin] Itching    Cefazolin Hives       Past Surgical History:   Procedure Laterality Date    FLEXIBLE SIGMOIDOSCOPY N/A 4/26/2017    FLEXIBLE SIGMOIDOSCOPY  performed by Claudette Kim MD at [address] [facility]  04/2017    reversal of ileostomy    HX HEENT      tonsillectomy    HX HERNIA REPAIR      HX KNEE REPLACEMENT N/A     right elbow repair    HX KNEE REPLACEMENT      HX SMALL BOWEL RESECTION      ileostomy formation    HX QUENTIN AND BSO      HX TONSILLECTOMY      HX TUBAL LIGATION N/A     reopened tubes    HX TUBAL LIGATION      HX UROLOGICAL  03/01/2017    s/p Placement of bilateral double-J stents - 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 Social History Main Topics    Smoking status: Light Tobacco Smoker    Smokeless tobacco: Never Used    Alcohol use No    Drug use: No    Sexual activity: Not on file     Other Topics Concern    Not on file     Social History Narrative    ** Merged History Encounter **            Visit Vitals    /84    Pulse 78    Temp 98.7 °F (37.1 °C)    Ht 5' 5.5\" (1.664 m)    Wt 203 lb 9.6 oz (92.4 kg)    SpO2 97%    BMI 33.37 kg/m2         PHYSICAL EXAMINATION:  GENERAL: Alert and oriented x3, in no acute distress, well-developed, well-nourished, afebrile. HEART: No JVD. EYES: No scleral icterus   NECK: No significant lymphadenopathy   LUNGS: No respiratory compromise or indrawing  ABDOMEN: Soft, non-tender, non-distended. Electronically signed by:  Cecil Ty MD [Negative] : Heme/Lymph

## 2025-08-20 ENCOUNTER — OFFICE VISIT (OUTPATIENT)
Age: 75
End: 2025-08-20

## 2025-08-20 VITALS — HEIGHT: 65 IN | WEIGHT: 204 LBS | BODY MASS INDEX: 33.99 KG/M2

## 2025-08-20 DIAGNOSIS — M81.0 AGE RELATED OSTEOPOROSIS, UNSPECIFIED PATHOLOGICAL FRACTURE PRESENCE: Primary | ICD-10-CM

## 2025-08-20 DIAGNOSIS — Z78.0 POST-MENOPAUSAL: ICD-10-CM

## 2025-08-20 DIAGNOSIS — M16.12 PRIMARY OSTEOARTHRITIS OF LEFT HIP: ICD-10-CM

## 2025-08-20 DIAGNOSIS — M17.12 PRIMARY OSTEOARTHRITIS OF LEFT KNEE: ICD-10-CM

## (undated) DEVICE — NEEDLE SPNL 22GA L3.5IN BLK HUB S STL REG WALL FIT STYL W/

## (undated) DEVICE — STOCKING COMPR XL L16-18IN LNG 19MMHG ANK 10-11IN CALF

## (undated) DEVICE — NEEDLE HYPO 21GA L1.5IN INTRAMUSCULAR S STL LATCH BVL UP

## (undated) DEVICE — NEEDLE HYPO 18GA L1.5IN PNK S STL HUB POLYPR SHLD REG BVL

## (undated) DEVICE — GOWN,REINFORCED,POLY,AURORA,XXLARGE,STR: Brand: MEDLINE

## (undated) DEVICE — SOLUTION IRRIG 3000ML LAC R FLX CONT

## (undated) DEVICE — 3M™ TEGADERM™ TRANSPARENT FILM DRESSING FRAME STYLE, 1626W, 4 IN X 4-3/4 IN (10 CM X 12 CM), 50/CT 4CT/CASE: Brand: 3M™ TEGADERM™

## (undated) DEVICE — 4-PORT MANIFOLD: Brand: NEPTUNE 2

## (undated) DEVICE — SYRINGE MED 3ML NDL 22GA L1 1/2IN REG BVL SFGLDE

## (undated) DEVICE — SOLUTION IV 1000ML 0.9% SOD CHL

## (undated) DEVICE — PENCIL ES L3M BTTN SWCH S STL HEX LOK BLDE ELECTRD HOLSTER

## (undated) DEVICE — INTENDED FOR TISSUE SEPARATION, AND OTHER PROCEDURES THAT REQUIRE A SHARP SURGICAL BLADE TO PUNCTURE OR CUT.: Brand: BARD-PARKER SAFETY BLADES SIZE 15, STERILE

## (undated) DEVICE — 3M™ STERI-DRAPE™ INSTRUMENT POUCH 1018: Brand: STERI-DRAPE™

## (undated) DEVICE — Device

## (undated) DEVICE — KENDALL SCD EXPRESS SLEEVES, KNEE LENGTH, MEDIUM: Brand: KENDALL SCD

## (undated) DEVICE — SPIROMETER INCENT 2500ML W ONE W VLV

## (undated) DEVICE — Z DISCONTINUED BY MEDLINE USE 2711682 TRAY SKIN PREP DRY W/ PREM GLV

## (undated) DEVICE — T5 HOOD WITH PEEL AWAY FACE SHIELD

## (undated) DEVICE — HANDPIECE SET WITH HIGH FLOW TIP AND SUCTION TUBE: Brand: INTERPULSE

## (undated) DEVICE — INTENDED FOR TISSUE SEPARATION, AND OTHER PROCEDURES THAT REQUIRE A SHARP SURGICAL BLADE TO PUNCTURE OR CUT.: Brand: BARD-PARKER SAFETY BLADES SIZE 10, STERILE

## (undated) DEVICE — REM POLYHESIVE ADULT PATIENT RETURN ELECTRODE: Brand: VALLEYLAB

## (undated) DEVICE — SYR LR LCK 1ML GRAD NSAF 30ML --

## (undated) DEVICE — SKIN MARKER,REGULAR TIP WITH RULER AND LABELS: Brand: DEVON

## (undated) DEVICE — SYR 10ML LUER LOK 1/5ML GRAD --

## (undated) DEVICE — 3M™ BAIR PAWS FLEX™ WARMING GOWN, STANDARD, 20 PER CASE 81003: Brand: BAIR PAWS™

## (undated) DEVICE — ZIMMER® STERILE DISPOSABLE TOURNIQUET CUFF WITH PLC, DUAL PORT, SINGLE BLADDER, 34 IN. (86 CM)

## (undated) DEVICE — SOFT SILICONE HYDROCELLULAR SACRUM DRESSING WITH LOCK AWAY LAYER: Brand: ALLEVYN LIFE SACRUM (LARGE) PACK OF 10

## (undated) DEVICE — JP 3-SPRING RES W/15FR PVC DRAIN/TR: Brand: CARDINAL HEALTH

## (undated) DEVICE — SLIM BODY SKIN STAPLER: Brand: APPOSE ULC

## (undated) DEVICE — SUTURE VCRL SZ 2 L27IN ABSRB VLT L65MM TP-1 1/2 CIR J649G

## (undated) DEVICE — ELASTIC BANDAGE 6": Brand: CARDINAL HEALTH

## (undated) DEVICE — 3M™ DURAPORE™ SURGICAL TAPE 1538-3, 3 INCH X 10 YARD (7,5CM X 9,1M), 4 ROLLS/BOX: Brand: 3M™ DURAPORE™

## (undated) DEVICE — SUTURE VCRL SZ 0 L27IN ABSRB UD L36MM CT-1 1/2 CIR J260H

## (undated) DEVICE — STOCKING ANTIEMB KNEE MED REG --

## (undated) DEVICE — PACK PROCEDURE SURG TOT HIP BSHR LF

## (undated) DEVICE — KIT CLN UP BON SECOURS MARYV

## (undated) DEVICE — NEEDLE NRV BLK 21GA L4IN 30DEG INSUL BVL EXTN SET STIMUPLEX

## (undated) DEVICE — BIPOLAR SEALER 23-112-1 AQM 6.0: Brand: AQUAMANTYS ®

## (undated) DEVICE — DRSG PATCH ANTIMIC 1INX4.0MM -- CONVERT TO ITEM 356053

## (undated) DEVICE — GOWN,REINF,POLY,ECL,PP SLV,3XL,XLONG: Brand: MEDLINE

## (undated) DEVICE — SUTURE VCRL SZ 0 L36IN ABSRB UD L36MM CT-1 1/2 CIR J946H

## (undated) DEVICE — WRAP KNEE COLD THERAPY --

## (undated) DEVICE — COVER LT HNDL BLU STRL -- MEDICHOICE

## (undated) DEVICE — SUTURE MCRYL SZ 2-0 L36IN ABSRB UD L36MM CT-1 1/2 CIR Y945H

## (undated) DEVICE — SUTURE VCRL SZ 1 L27IN ABSRB VLT CTX L48MM 1 2 CIR SGL ARMED J365H

## (undated) DEVICE — X-RAY SPONGES,12 PLY: Brand: DERMACEA

## (undated) DEVICE — GEL BAG FOR WRAPS --

## (undated) DEVICE — PACK SURG BSHR TOT KNEE LF

## (undated) DEVICE — Z DISCONTINUED USE 2744636  DRESSING AQUACEL 14 IN ALG W3.5XL14IN POLYUR FLM CVR W/ HYDRCOLL

## (undated) DEVICE — HOOD, PEEL-AWAY: Brand: FLYTE

## (undated) DEVICE — PREP SKN CHLRAPRP 26ML TNT -- CONVERT TO ITEM 373320

## (undated) DEVICE — DRSG AQUACEL SURG 3.5 X 10IN -- CONVERT TO ITEM 370183

## (undated) DEVICE — SOLUTION IRRIG 3000ML 0.9% SOD CHL FLX CONT 0797208] ICU MEDICAL INC]

## (undated) DEVICE — STRYKER PERFORMANCE SERIES SAGITTAL BLADE: Brand: STRYKER PERFORMANCE SERIES

## (undated) DEVICE — HIP PILLOW, ABDUCTION: Brand: DEROYAL

## (undated) DEVICE — BOWL AND CEMENT CARTRIDGE WITH BREAKAWAY FEMORAL NOZZLE: Brand: ACM

## (undated) DEVICE — PREP SKN CHLRAPRP APPL 10.5ML --

## (undated) DEVICE — SMARTSLEEVE SURGICAL GOWN, 3XL LONG: Brand: CONVERTORS

## (undated) DEVICE — 3M™ STERI-DRAPE™ U-DRAPE 1015: Brand: STERI-DRAPE™

## (undated) DEVICE — BLADE RMFG DBL RECIP DBL SD --

## (undated) DEVICE — STOCKING ANTIEMB KNEE LG REG --

## (undated) DEVICE — INTENDED FOR TISSUE SEPARATION, AND OTHER PROCEDURES THAT REQUIRE A SHARP SURGICAL BLADE TO PUNCTURE OR CUT.: Brand: BARD-PARKER ® STAINLESS STEEL BLADES